# Patient Record
Sex: FEMALE | Race: BLACK OR AFRICAN AMERICAN
[De-identification: names, ages, dates, MRNs, and addresses within clinical notes are randomized per-mention and may not be internally consistent; named-entity substitution may affect disease eponyms.]

---

## 2018-10-21 ENCOUNTER — HOSPITAL ENCOUNTER (EMERGENCY)
Dept: HOSPITAL 97 - ER | Age: 28
Discharge: HOME | End: 2018-10-21
Payer: COMMERCIAL

## 2018-10-21 DIAGNOSIS — Z3A.35: ICD-10-CM

## 2018-10-21 DIAGNOSIS — L73.9: Primary | ICD-10-CM

## 2018-10-21 PROCEDURE — 0J9F0ZZ DRAINAGE OF LEFT UPPER ARM SUBCUTANEOUS TISSUE AND FASCIA, OPEN APPROACH: ICD-10-PCS

## 2018-10-21 PROCEDURE — 99283 EMERGENCY DEPT VISIT LOW MDM: CPT

## 2018-10-21 NOTE — EDPHYS
Physician Documentation                                                                           

 [facility]                                                                

Name: Joi Duncan                                                                                

Age: 28 yrs                                                                                       

Sex: Female                                                                                       

: 1990                                                                                   

MRN: [id number]                                                                                   

Arrival Date: 10/21/2018                                                                          

Time: 14:43                                                                                       

Account#: [id number]                                                                            

Bed 20                                                                                            

Private MD:                                                                                       

ED Physician Kalia Alcazar                                                                       

HPI:                                                                                              

10/21                                                                                             

15:15 This 28 yrs old Black Female presents to ER via Ambulatory with complaints of Abscess.  snw 

15:15 the patient presents with a swollen area of the left axilla. Description: The affected  snw 

      area is very small, localized, swollen. Onset: The symptoms/episode began/occurred          

      suddenly, 2 day(s) ago, and became persistent. Associated signs and symptoms: Pertinent     

      positives: pain. Severity of symptoms: At their worst the symptoms were mild, moderate.     

      The patient has not experienced similar symptoms in the past. 35 wk IUP, no vomiting,       

      no fever.                                                                                   

                                                                                                  

OB/GYN:                                                                                           

15:01 LMP 2018                                                                           aj1 

                                                                                                  

Historical:                                                                                       

- Allergies:                                                                                      

15:01 No Known Allergies;                                                                     aj1 

- Home Meds:                                                                                      

15:01 Prenatal Vitamin Oral tab 1 tab once daily [Active];                                    aj1 

- PMHx:                                                                                           

15:01 gestational diabetes;                                                                   aj1 

                                                                                                  

- Immunization history:: Flu vaccine is not up to date.                                           

- Social history:: Smoking status: Patient/guardian denies using tobacco.                         

- Ebola Screening: : Patient denies travel to an Ebola-affected area in the 21 days               

  before illness onset.                                                                           

                                                                                                  

                                                                                                  

ROS:                                                                                              

15:15 Constitutional: Negative for fever, chills, and weight loss, Eyes: Negative for injury, snw 

      pain, redness, and discharge, ENT: Negative for injury, pain, and discharge, Neck:          

      Negative for injury, pain, and swelling, Cardiovascular: Negative for chest pain,           

      palpitations, and edema, Respiratory: Negative for shortness of breath, cough,              

      wheezing, and pleuritic chest pain, Abdomen/GI: Negative for abdominal pain, nausea,        

      vomiting, diarrhea, and constipation, Back: Negative for injury and pain, : Negative      

      for injury, bleeding, discharge, and swelling, MS/Extremity: Negative for injury and        

      deformity, Neuro: Negative for headache, weakness, numbness, tingling, and seizure,         

      Psych: Negative for depression, anxiety, suicide ideation, homicidal ideation, and          

      hallucinations.                                                                             

15:15 Skin: Positive for pustules, swelling, of the left axilla.                                  

                                                                                                  

Exam:                                                                                             

15:13 Constitutional:  This is a well developed, well nourished patient who is awake, alert,  snw 

      and in no acute distress. Head/Face:  Normocephalic, atraumatic. Eyes:  Pupils equal        

      round and reactive to light, extra-ocular motions intact.  Lids and lashes normal.          

      Conjunctiva and sclera are non-icteric and not injected.  Cornea within normal limits.      

      Periorbital areas with no swelling, redness, or edema. ENT:  Nares patent. No nasal         

      discharge, no septal abnormalities noted.  Tympanic membranes are normal and external       

      auditory canals are clear.  Oropharynx with no redness, swelling, or masses, exudates,      

      or evidence of obstruction, uvula midline.  Mucous membranes moist. Neck:  Trachea          

      midline, no thyromegaly or masses palpated, and no cervical lymphadenopathy.  Supple,       

      full range of motion without nuchal rigidity, or vertebral point tenderness.  No            

      Meningismus. Chest/axilla:  Normal chest wall appearance and motion.  Nontender with no     

      deformity.  No lesions are appreciated. Cardiovascular:  Regular rate and rhythm with a     

      normal S1 and S2.  No gallops, murmurs, or rubs.  Normal PMI, no JVD.  No pulse             

      deficits. Respiratory:  Lungs have equal breath sounds bilaterally, clear to                

      auscultation and percussion.  No rales, rhonchi or wheezes noted.  No increased work of     

      breathing, no retractions or nasal flaring. Abdomen/GI:  Soft, non-tender, with normal      

      bowel sounds.  No distension or tympany.  No guarding or rebound.  No evidence of           

      tenderness throughout. Back:  No spinal tenderness.  No costovertebral tenderness.          

      Full range of motion. MS/ Extremity:  Pulses equal, no cyanosis.  Neurovascular intact.     

       Full, normal range of motion. Neuro:  Awake and alert, GCS 15, oriented to person,         

      place, time, and situation.  Cranial nerves II-XII grossly intact.  Motor strength 5/5      

      in all extremities.  Sensory grossly intact.  Cerebellar exam normal.  Normal gait.         

      Psych:  Awake, alert, with orientation to person, place and time.  Behavior, mood, and      

      affect are within normal limits.                                                            

15:13 Skin: Appearance: normal except for affected area, lesion(s), located on the  lower         

      left axilla, small, small, tender folliculitis.                                             

                                                                                                  

Vital Signs:                                                                                      

15:01  / 58; Pulse 109; Resp 20; Temp 97.0; Pulse Ox 100% on R/A; Weight 104.78 kg (R); aj1 

      Height 4 ft. 11 in. (149.86 cm) (R); Pain 10/10;                                            

16:00  / 72; Pulse 87; Resp 16; Pulse Ox 99% on R/A; Pain 4/10;                         em  

15:01 Body Mass Index 46.66 (104.78 kg, 149.86 cm)                                            aj 

                                                                                                  

Procedures:                                                                                       

15:30 I \T\ D: Incision and drainage was performed for an abscess of the left axilla. Prepped   snw

      with hibiclens. Anesthetized with 2 ml's 1% Lidocaine. Incised with #11 blade. Drained      

      large amount purulent fluid. Dressing: sterile 4x4 gauze, the patient tolerated the         

      procedure well.                                                                             

                                                                                                  

MDM:                                                                                              

15:07 Patient medically screened.                                                             snw 

15:14 Data reviewed: vital signs, nurses notes. Data interpreted: Pulse oximetry: on room air snw 

      is 100 %. Interpretation: normal. Counseling: I had a detailed discussion with the          

      patient and/or guardian regarding: the historical points, exam findings, and any            

      diagnostic results supporting the discharge/admit diagnosis, the need for outpatient        

      follow up, for definitive care, to return to the emergency department if symptoms           

      worsen or persist or if there are any questions or concerns that arise at home. Special     

      discussion: I discussed in detail with the patient the higher chance of wound infection     

      based on his presenting history. Based on the history and exam findings, there is no        

      indication for further emergent testing or inpatient evaluation. I discussed with the       

      patient/guardian the need to see the OB Gyne specialist for further evaluation of the       

      symptoms. I discussed with the patient/guardian the need to see the primary care            

      provider for further evaluation of the symptoms.                                            

                                                                                                  

Administered Medications:                                                                         

15:23 Drug: Lidocaine (1 %) 5 mg Route: Infiltration;                                         em  

15:33 Follow up: Response: No adverse reaction; Pain is decreased                             em  

15:23 Drug: Clindamycin 300 mg Route: PO;                                                     em  

15:33 Follow up: Response: No adverse reaction                                                em  

                                                                                                  

                                                                                                  

Disposition:                                                                                      

16:48 Co-signature as Attending Physician, Kalia Alcazar MD I agree with the assessment and   kdr 

      plan of care.                                                                               

                                                                                                  

Disposition:                                                                                      

10/21/18 15:12 Discharged to Home. Impression: Folliculitis, Pregnant state.                      

- Condition is Stable.                                                                            

- Discharge Instructions: Folliculitis, Third Trimester of Pregnancy, Easy-to-Read,               

  Incision and Drainage, Care After, Heat Therapy.                                                

- Prescriptions for Clindamycin HCl 300 mg Oral Capsule - take 1 capsule by ORAL route            

  every 6 hours for 10 days; 40 capsule.                                                          

- Medication Reconciliation Form, Thank You Letter, Antibiotic Education, Prescription            

  Opioid Use form.                                                                                

- Follow up: Private Physician; When: 2 - 3 days; Reason: Recheck today's complaints,             

  Continuance of care, Re-evaluation by your physician. Follow up: Emergency                      

  Department; When: As needed; Reason: Worsening of condition.                                    

                                                                                                  

                                                                                                  

                                                                                                  

Signatures:                                                                                       

Belinda Ashford RN                     RN   aj1                                                  

Kalia Alcazar MD MD kdr Therrien, Shelly, FNP-C                 FNP-Csnw                                                  

Oswald Lane, FABIOLAN                       LVN  em                                                   

                                                                                                  

Corrections: (The following items were deleted from the chart)                                    

16:14 15:12 10/21/2018 15:12 Discharged to Home. Impression: Folliculitis; Pregnant state.    em  

      Condition is Stable. Forms are Medication Reconciliation Form, Thank You Letter,            

      Antibiotic Education, Prescription Opioid Use. Follow up: Private Physician; When: 2 -      

      3 days; Reason: Recheck today's complaints, Continuance of care, Re-evaluation by your      

      physician. Follow up: Emergency Department; When: As needed; Reason: Worsening of           

      condition. snw                                                                              

                                                                                                  

**************************************************************************************************

## 2020-07-28 ENCOUNTER — HOSPITAL ENCOUNTER (EMERGENCY)
Dept: HOSPITAL 97 - ER | Age: 30
Discharge: HOME | End: 2020-07-28
Payer: SELF-PAY

## 2020-07-28 VITALS — TEMPERATURE: 97.6 F

## 2020-07-28 VITALS — SYSTOLIC BLOOD PRESSURE: 99 MMHG | OXYGEN SATURATION: 99 % | DIASTOLIC BLOOD PRESSURE: 66 MMHG

## 2020-07-28 DIAGNOSIS — O24.419: ICD-10-CM

## 2020-07-28 DIAGNOSIS — O20.0: Primary | ICD-10-CM

## 2020-07-28 DIAGNOSIS — Z3A.01: ICD-10-CM

## 2020-07-28 LAB
BUN BLD-MCNC: 5 MG/DL (ref 7–18)
GLUCOSE SERPLBLD-MCNC: 93 MG/DL (ref 74–106)
HCG SERPL-ACNC: (no result) MIU/ML (ref 1–3)
HCT VFR BLD CALC: 42.2 % (ref 36–45)
LYMPHOCYTES # SPEC AUTO: 2.1 K/UL (ref 0.7–4.9)
PMV BLD: 8.3 FL (ref 7.6–11.3)
POTASSIUM SERPL-SCNC: 4 MMOL/L (ref 3.5–5.1)
RBC # BLD: 4.46 M/UL (ref 3.86–4.86)

## 2020-07-28 PROCEDURE — 81025 URINE PREGNANCY TEST: CPT

## 2020-07-28 PROCEDURE — 80048 BASIC METABOLIC PNL TOTAL CA: CPT

## 2020-07-28 PROCEDURE — 85025 COMPLETE CBC W/AUTO DIFF WBC: CPT

## 2020-07-28 PROCEDURE — 99284 EMERGENCY DEPT VISIT MOD MDM: CPT

## 2020-07-28 PROCEDURE — 86900 BLOOD TYPING SEROLOGIC ABO: CPT

## 2020-07-28 PROCEDURE — 76817 TRANSVAGINAL US OBSTETRIC: CPT

## 2020-07-28 PROCEDURE — 96360 HYDRATION IV INFUSION INIT: CPT

## 2020-07-28 PROCEDURE — 81003 URINALYSIS AUTO W/O SCOPE: CPT

## 2020-07-28 PROCEDURE — 86901 BLOOD TYPING SEROLOGIC RH(D): CPT

## 2020-07-28 PROCEDURE — 84702 CHORIONIC GONADOTROPIN TEST: CPT

## 2020-07-28 PROCEDURE — 96361 HYDRATE IV INFUSION ADD-ON: CPT

## 2020-07-28 PROCEDURE — 36415 COLL VENOUS BLD VENIPUNCTURE: CPT

## 2020-07-28 NOTE — EDPHYS
Physician Documentation                                                                           

 [facility]                                                                 

Name: Joi Duncan                                                                                

Age: 30 yrs                                                                                       

Sex: Female                                                                                       

: 1990                                                                                   

MRN: [id number]                                                                                   

Arrival Date: 2020                                                                          

Time: 08:34                                                                                       

Account#: [id number]                                                                            

Bed 7                                                                                             

Private MD:                                                                                       

ALISSA Physician Andrea Levin                                                                      

HPI:                                                                                              

                                                                                             

10:03 This 30 yrs old Black Female presents to ER via Ambulatory with complaints of RT SIDE   angel 

      ABD PAIN (7WKS PREG).                                                                       

10:03 The patient presents to the emergency department with vaginal bleeding, that is light.  angel 

      The estimated gestational age is 7 weeks. Pregnancy course: Prenatal care: none.            

      Previous pregnancies: in previous pregnancies patient has had vaginal delivery.             

      Associated signs and symptoms: The patient has no apparent associated signs or              

      symptoms. The patient has not experienced similar symptoms in the past.                     

                                                                                                  

OB/GYN:                                                                                           

09:49 LMP 2020                                                                           tw2 

10:03  6, Full Term 4, Premature 0,  1, Living 4                               angel 

                                                                                                  

Historical:                                                                                       

- Allergies:                                                                                      

09:19 No Known Allergies;                                                                     ss  

- Home Meds:                                                                                      

09:50 Prenatal Vitamin Oral tab 1 tab once daily [Active];                                    tw2 

- PMHx:                                                                                           

09:19 gestational diabetes;                                                                   ss  

- PSHx:                                                                                           

09:19 None;                                                                                   ss  

                                                                                                  

- Immunization history:: Adult Immunizations up to date.                                          

- Social history:: Smoking status: Patient denies any tobacco usage or history of.                

                                                                                                  

                                                                                                  

ROS:                                                                                              

10:04 Constitutional: Negative for fever, chills, and weight loss, Eyes: Negative for injury, angel 

      pain, redness, and discharge, ENT: Negative for injury, pain, and discharge, Neck:          

      Negative for injury, pain, and swelling, Cardiovascular: Negative for chest pain,           

      palpitations, and edema, Respiratory: Negative for shortness of breath, cough,              

      wheezing, and pleuritic chest pain, Back: Negative for injury and pain, : Negative        

      for injury, bleeding, discharge, and swelling, MS/Extremity: Negative for injury and        

      deformity, Skin: Negative for injury, rash, and discoloration, Neuro: Negative for          

      headache, weakness, numbness, tingling, and seizure, Psych: Negative for depression,        

      anxiety, suicide ideation, homicidal ideation, and hallucinations, Allergy/Immunology:      

      Negative for hives, rash, and allergies, Endocrine: Negative for neck swelling,             

      polydipsia, polyuria, polyphagia, and marked weight changes, Hematologic/Lymphatic:         

      Negative for swollen nodes, abnormal bleeding, and unusual bruising.                        

10:04 Abdomen/GI: Positive for abdominal pain, of the right lower quadrant.                       

                                                                                                  

Exam:                                                                                             

10:04 Constitutional:  This is a well developed, well nourished patient who is awake, alert,  angel 

      and in no acute distress. Head/Face:  Normocephalic, atraumatic. Eyes:  Pupils equal        

      round and reactive to light, extra-ocular motions intact.  Lids and lashes normal.          

      Conjunctiva and sclera are non-icteric and not injected.  Cornea within normal limits.      

      Periorbital areas with no swelling, redness, or edema. ENT:  Nares patent. No nasal         

      discharge, no septal abnormalities noted.  Tympanic membranes are normal and external       

      auditory canals are clear.  Oropharynx with no redness, swelling, or masses, exudates,      

      or evidence of obstruction, uvula midline.  Mucous membranes moist. Neck:  Trachea          

      midline, no thyromegaly or masses palpated, and no cervical lymphadenopathy.  Supple,       

      full range of motion without nuchal rigidity, or vertebral point tenderness.  No            

      Meningismus. Chest/axilla:  Normal chest wall appearance and motion.  Nontender with no     

      deformity.  No lesions are appreciated. Cardiovascular:  Regular rate and rhythm with a     

      normal S1 and S2.  No gallops, murmurs, or rubs.  Normal PMI, no JVD.  No pulse             

      deficits. Respiratory:  Lungs have equal breath sounds bilaterally, clear to                

      auscultation and percussion.  No rales, rhonchi or wheezes noted.  No increased work of     

      breathing, no retractions or nasal flaring. Back:  No spinal tenderness.  No                

      costovertebral tenderness.  Full range of motion. Female :  Normal external               

      genitalia. Skin:  Warm, dry with normal turgor.  Normal color with no rashes, no            

      lesions, and no evidence of cellulitis. MS/ Extremity:  Pulses equal, no cyanosis.          

      Neurovascular intact.  Full, normal range of motion. Neuro:  Awake and alert, GCS 15,       

      oriented to person, place, time, and situation.  Cranial nerves II-XII grossly intact.      

      Motor strength 5/5 in all extremities.  Sensory grossly intact.  Cerebellar exam            

      normal.  Normal gait. Psych:  Awake, alert, with orientation to person, place and time.     

       Behavior, mood, and affect are within normal limits.                                       

10:04 Abdomen/GI: Inspection: distension, Bowel sounds: normal, Palpation: soft, nontender,       

      Liver: no appreciated palpable abnormalities, Hernia: not appreciated.                      

                                                                                                  

Vital Signs:                                                                                      

09:04  / 57; Pulse 95; Resp 17; Temp 97.6(TE); Pulse Ox 100% on R/A; Weight 97.98 kg    tw2 

      (R); Height 5 ft. 0 in. (152.40 cm); Pain 8/10;                                             

09:48 BP 93 / 54 Supine; Pulse 74; Resp 17; Pulse Ox 100% on R/A;                             tw2 

10:36 BP 99 / 66; Pulse 72; Resp 16; Pulse Ox 99% on R/A;                                     tw2 

09:04 Body Mass Index 42.18 (97.98 kg, 152.40 cm)                                             tw2 

                                                                                                  

MDM:                                                                                              

08:50 Patient medically screened.                                                             [facility] 

10:14 Data reviewed: vital signs, nurses notes, lab test result(s), radiologic studies,       angel 

      ultrasound.                                                                                 

10:15 Differential diagnosis: Data interpreted: Cardiac monitor: not applicable for this      [facility] 

      patient encounter. rate is 74 beats/min, Pulse oximetry: on room air is 100 %.              

      Counseling: I had a detailed discussion with the patient and/or guardian regarding: the     

      historical points, exam findings, and any diagnostic results supporting the                 

      discharge/admit diagnosis, lab results, radiology results, the need for outpatient          

      follow up, for definitive care, an OB/Gyne specialist. ED course: non toxic, m obesity,     

      pelvic rest, follow , return if symptoms worse.                                             

                                                                                                  

                                                                                             

08:51 Order name: Quantitative Hcg                                                            [facility] 

                                                                                             

08:51 Order name: Abo/rh Typing; Complete Time: 10:54                                         [facility] 

                                                                                             

08:51 Order name: Basic Metabolic Panel; Complete Time: 10:54                                 [facility] 

                                                                                             

08:51 Order name: CBC with Diff; Complete Time: 10:54                                         [facility] 

                                                                                             

08:52 Order name: HCG, Quantitative; Complete Time: 10:54                                     [facility]

                                                                                             

09:29 Order name: Urine Dipstick--Ancillary (enter results); Complete Time: 10:54             bd  

                                                                                             

08:51 Order name: Urine Pregnancy Test (obtain specimen); Complete Time: 09:24                [facility] 

                                                                                             

08:51 Order name: IV Saline Lock; Complete Time: 09:24                                        [facility] 

                                                                                             

08:51 Order name: Labs collected and sent; Complete Time: 09:24                               [facility] 

                                                                                             

08:51 Order name: NPO; Complete Time: 10:38                                                   [facility] 

                                                                                             

09:00 Order name: Transvaginal OB                                                             [facility]

                                                                                             

09:29 Order name: Urine Pregnancy--Ancillary (enter results); Complete Time: 10:54            bd  

                                                                                             

08:51 Order name: Urine Dipstick-Ancillary (obtain specimen); Complete Time: 09:24            [facility] 

                                                                                                  

Administered Medications:                                                                         

09:16 Drug: NS 0.9% 1000 ml Route: IV; Rate: 1 bolus; Site: right antecubital;                tw2 

11:00 Follow up: Response: No adverse reaction; IV Status: Completed infusion; IV Intake:     tw2 

      1000ml                                                                                      

                                                                                                  

                                                                                                  

Disposition:                                                                                      

20 10:55 Discharged to Home. Impression: Pregnancy related conditions, unspecified,         

  first trimester, Threatened .                                                           

- Condition is Stable.                                                                            

- Discharge Instructions: Threatened Miscarriage, First Trimester of Pregnancy,                   

  Easy-to-Read, First Trimester of Pregnancy, Threatened Miscarriage, Easy-to-Read,               

  Pelvic Rest, Vaginal Bleeding During Pregnancy, First Trimester, Easy-to-Read.                  

- Prescriptions for Prenatal Vitamin 27- 0.8 mg Oral Tablet - take 1 tablet by ORAL               

  route once daily; 30 tablet.                                                                    

- Medication Reconciliation Form, Thank You Letter, Antibiotic Education, Prescription            

  Opioid Use, Work release form form.                                                             

- Follow up: Private Physician; When: 2 - 3 days; Reason: Recheck today's complaints,             

  Continuance of care, Re-evaluation by your physician. Follow up: Keron Quiroz;                

  When: 2 - 3 days; Reason: Recheck today's complaints, Re-evaluation by your physician.          

- Problem is new.                                                                                 

- Symptoms have improved.                                                                         

                                                                                                  

                                                                                                  

                                                                                                  

Signatures:                                                                                       

Dispatcher [employer]                           [facility]                                                 

Andrea Levin MD MD cha Smirch, Shelby, RN                      RN   ss                                                   

Ana Pineda RN                          RN   tw2                                                  

                                                                                                  

Corrections: (The following items were deleted from the chart)                                    

09:00 08:52 Transvaginal Study (Probe)+[id number] ordered. [facility]

09:50 09:19 Home Meds: None;                                                                tw2 

11:11 10:55 2020 10:55 Discharged to Home. Impression: Pregnancy related conditions,    tw2 

      unspecified, first trimester; Threatened . Condition is Stable. Discharge           

      Instructions: Threatened Miscarriage, Threatened Miscarriage, Easy-to-Read, First           

      Trimester of Pregnancy, Easy-to-Read, First Trimester of Pregnancy, Pelvic Rest,            

      Vaginal Bleeding During Pregnancy, First Trimester, Easy-to-Read. Prescriptions for         

      Prenatal Vitamin 27-0.8 mg Oral Tablet - take 1 tablet by ORAL route once daily; 30         

      tablet. and Forms are Work release form, Medication Reconciliation Form, Thank You          

      Letter, Antibiotic Education, Prescription Opioid Use. Follow up: Private Physician;        

      When: 2 - 3 days; Reason: Recheck today's complaints, Continuance of care,                  

      Re-evaluation by your physician. Follow up: Keron Quiroz; When: 2 - 3 days; Reason:       

      Recheck today's complaints, Re-evaluation by your physician. Problem is new. Symptoms       

      have improved. angel                                                                          

                                                                                                  

**************************************************************************************************

## 2020-07-28 NOTE — ER
Nurse's Notes                                                                                     

 [facility] [place]                                                                 

Name: Joi Duncan                                                                                

Age: 30 yrs                                                                                       

Sex: Female                                                                                       

: 1990                                                                                   

MRN: [id number]                                                                                   

Arrival Date: 2020                                                                          

Time: 08:34                                                                                       

Account#: [id number]                                                                            

Bed 7                                                                                             

Private MD:                                                                                       

Diagnosis: Pregnancy related conditions, unspecified, first trimester;Threatened          

                                                                                                  

Presentation:                                                                                     

                                                                                             

09:13 Chief complaint: Patient states: Seen last week to have IUD removed but clinician was   ss  

      unable to locate the IUD and ordered an outpatient ultrasound. Pt did not make her US       

      appointment and reports that she recently had a positive home UPT. RLQ pain and brown       

      spotting that began yesterday. Coronavirus screen: Patient denies a cough. Patient          

      denies shortness of breath or difficulty breathing. Patient denies measured and/or          

      subjective temperature greater than 100.4F prior to today's visit. Patient denies           

      travel on a cruise ship or to a country the [facility] currently lists as an affected area.        

      Patient denies contact with known and/or suspected case of COVID-19. Proceed with           

      normal triage. Ebola Screen: Patient denies exposure to infectious person. Patient          

      denies travel to an Ebola-affected area in the 21 days before illness onset. Initial        

      Sepsis Screen: Does the patient meet any 2 criteria? HR > 90 bpm. Does the patient have     

      a suspected source of infection? No. Patient's initial sepsis screen is negative. Risk      

      Assessment: Do you want to hurt yourself or someone else? Patient reports no desire to      

      harm self or others. Onset of symptoms was 2020.                                   

09:13 Method Of Arrival: Ambulatory                                                           ss  

09:13 Acuity: DIEGO 3                                                                           ss  

                                                                                                  

OB/GYN:                                                                                           

09:49 LMP 2020                                                                           tw2 

10:03  6, Full Term 4, Premature 0,  1, Living 4                               angel 

                                                                                                  

Historical:                                                                                       

- Allergies:                                                                                      

09:19 No Known Allergies;                                                                     ss  

- Home Meds:                                                                                      

09:50 Prenatal Vitamin Oral tab 1 tab once daily [Active];                                    tw2 

- PMHx:                                                                                           

09:19 gestational diabetes;                                                                   ss  

- PSHx:                                                                                           

09:19 None;                                                                                   ss  

                                                                                                  

- Immunization history:: Adult Immunizations up to date.                                          

- Social history:: Smoking status: Patient denies any tobacco usage or history of.                

                                                                                                  

                                                                                                  

Screenin:48 Abuse screen: Denies threats or abuse. Nutritional screening: No deficits noted.        tw2 

      Tuberculosis screening: No symptoms or risk factors identified. Fall Risk None              

      identified.                                                                                 

                                                                                                  

Assessment:                                                                                       

08:45 General: Appears in no apparent distress. obese, well groomed, Behavior is calm,        tw2 

      cooperative, appropriate for age. Pain: Complains of pain in right lower quadrant.          

      Neuro: Level of Consciousness is awake, alert, obeys commands, Oriented to person,          

      place, time, situation. Cardiovascular: Heart tones S1 S2 Capillary refill < 3 seconds      

      Patient's skin is warm and dry. Respiratory: Airway is patent Respiratory effort is         

      even, unlabored, Respiratory pattern is regular, symmetrical, Breath sounds are clear       

      bilaterally. GI: No signs and/or symptoms were reported involving the gastrointestinal      

      system. Abdomen is round non-distended, obese, Bowel sounds present X 4 quads. :          

      Reports cramping, in right lower quadrant(s) "also im spotting like pink today, but         

      yesterday it was brown". EENT: No signs and/or symptoms were reported regarding the         

      EENT system. Derm: No signs and/or symptoms reported regarding the dermatologic system.     

      Musculoskeletal: Circulation, motion, and sensation intact. Capillary refill < 3            

      seconds.                                                                                    

09:48 Reassessment: Patient appears in no apparent distress at this time. No changes from     tw2 

      previously documented assessment. Patient and/or family updated on plan of care and         

      expected duration. Pain level reassessed. Patient is alert, oriented x 3, equal             

      unlabored respirations, skin warm/dry/pink.                                                 

10:36 Reassessment: Patient appears in no apparent distress at this time. No changes from     tw2 

      previously documented assessment. Patient and/or family updated on plan of care and         

      expected duration. Pain level reassessed. Patient is alert, oriented x 3, equal             

      unlabored respirations, skin warm/dry/pink.                                                 

11:11 Reassessment: Patient appears in no apparent distress at this time. No changes from     tw2 

      previously documented assessment. Patient and/or family updated on plan of care and         

      expected duration. Pain level reassessed. Patient is alert, oriented x 3, equal             

      unlabored respirations, skin warm/dry/pink.                                                 

                                                                                                  

Vital Signs:                                                                                      

09:04  / 57; Pulse 95; Resp 17; Temp 97.6(TE); Pulse Ox 100% on R/A; Weight 97.98 kg    tw2 

      (R); Height 5 ft. 0 in. (152.40 cm); Pain 8/10;                                             

09:48 BP 93 / 54 Supine; Pulse 74; Resp 17; Pulse Ox 100% on R/A;                             tw2 

10:36 BP 99 / 66; Pulse 72; Resp 16; Pulse Ox 99% on R/A;                                     tw2 

09:04 Body Mass Index 42.18 (97.98 kg, 152.40 cm)                                             tw2 

                                                                                                  

ED Course:                                                                                        

08:34 Patient arrived in ED.                                                                  fj1 

08:45 Ana Pineda, RN is Primary Nurse.                                                        tw2 

08:45 Bed in low position. Call light in reach. Pulse ox on. NIBP on. Warm blanket given.     tw2 

08:50 Andrea Levin MD is Attending Physician.                                             [facility] 

08:54 Radiology exam delayed due to lab results not completed at this time. (HCG) pregnancy   aa4 

      test not completed at this time.                                                            

09:15 Inserted saline lock: 20 gauge in right antecubital area, using aseptic technique.      tw2 

      Blood collected.                                                                            

09:17 Triage completed.                                                                         

09:19 Arm band placed on right wrist.                                                           

10:23 Transvaginal OB In Process Unspecified.                                                 EDMS

10:55 Keron Quiroz MD is Referral Physician.                                              [facility] 

11:11 No provider procedures requiring assistance completed. IV discontinued, intact,         tw2 

      bleeding controlled, No redness/swelling at site. Pressure dressing applied.                

                                                                                                  

Administered Medications:                                                                         

09:16 Drug: NS 0.9% 1000 ml Route: IV; Rate: 1 bolus; Site: right antecubital;                tw2 

11:00 Follow up: Response: No adverse reaction; IV Status: Completed infusion; IV Intake:     tw2 

      1000ml                                                                                      

                                                                                                  

                                                                                                  

Intake:                                                                                           

11:00 IV: 1000ml; Total: 1000ml.                                                              tw2 

                                                                                                  

Outcome:                                                                                          

10:55 Discharge ordered by MD.                                                                [facility] 

11:11 Discharged to home ambulatory.                                                          tw2 

11:11 Condition: stable                                                                           

11:11 Discharge instructions given to patient, Instructed on discharge instructions, follow       

      up and referral plans. medication usage, Demonstrated understanding of instructions,        

      follow-up care, medications.                                                                

11:11 Patient left the ED.                                                                    tw2 

                                                                                                  

Signatures:                                                                                       

Dispatcher [employer]                           EDMS                                                 

Andrea Levin MD MD cha Frazier, Amanda                              aa4                                                  

Kate Urias RN RN                                                      

Ana Pineda RN                          RN   tw2                                                  

Lauri Sneed                                 fj1                                                  

                                                                                                  

Corrections: (The following items were deleted from the chart)                                    

09:17 08:51 Chief complaint: ss                                                               ss  

09:50 09:19 Home Meds: None;                                                                tw2 

                                                                                                  

**************************************************************************************************

## 2020-07-28 NOTE — RAD REPORT
EXAM DESCRIPTION:  US - Transvaginal OB - 7/28/2020 10:23 am

 

CLINICAL HISTORY:  ABD CRAMPING, PREGNANT

 

COMPARISON:  No comparisons

 

FINDINGS:  A single gestational sac is seen within the uterus. The shape of the sac is within normal 
limits for gestational age. Within the sac is a single fetal pole with crown-rump length of 11 mm, co
rrelating to estimated gestational age of 7 weeks 1 day. Estimated date of delivery is 03/15/2021.

 

Heart rate is 149 BPM.

 

The placenta is not yet developed due to early gestational age.

 

The maternal adnexa and ovaries are within normal limits. Normal Doppler blood flow was demonstrated 
to both ovaries.

 

 

IMPRESSION:  Single live early intrauterine gestation with estimated gestational age of 7 weeks 1 day
, RINA 03/15/2021.

## 2024-11-06 ENCOUNTER — HOSPITAL ENCOUNTER (EMERGENCY)
Dept: HOSPITAL 97 - ER | Age: 34
Discharge: HOME | End: 2024-11-06
Payer: SELF-PAY

## 2024-11-06 VITALS — SYSTOLIC BLOOD PRESSURE: 136 MMHG | OXYGEN SATURATION: 100 % | TEMPERATURE: 98.7 F | DIASTOLIC BLOOD PRESSURE: 69 MMHG

## 2024-11-06 DIAGNOSIS — L23.89: Primary | ICD-10-CM

## 2024-11-08 ENCOUNTER — HOSPITAL ENCOUNTER (EMERGENCY)
Dept: HOSPITAL 97 - ER | Age: 34
Discharge: HOME | End: 2024-11-08
Payer: SELF-PAY

## 2024-11-08 VITALS — TEMPERATURE: 98.1 F | OXYGEN SATURATION: 96 % | DIASTOLIC BLOOD PRESSURE: 90 MMHG | SYSTOLIC BLOOD PRESSURE: 128 MMHG

## 2024-11-08 DIAGNOSIS — L50.9: Primary | ICD-10-CM

## 2024-11-08 PROCEDURE — 96375 TX/PRO/DX INJ NEW DRUG ADDON: CPT

## 2024-11-08 PROCEDURE — 99284 EMERGENCY DEPT VISIT MOD MDM: CPT

## 2024-11-08 PROCEDURE — 96374 THER/PROPH/DIAG INJ IV PUSH: CPT

## 2025-01-31 ENCOUNTER — HOSPITAL ENCOUNTER (EMERGENCY)
Dept: HOSPITAL 97 - ER | Age: 35
Discharge: HOME | End: 2025-01-31
Payer: SELF-PAY

## 2025-01-31 VITALS — DIASTOLIC BLOOD PRESSURE: 96 MMHG | SYSTOLIC BLOOD PRESSURE: 133 MMHG | TEMPERATURE: 98.2 F | OXYGEN SATURATION: 100 %

## 2025-01-31 DIAGNOSIS — R19.7: ICD-10-CM

## 2025-01-31 DIAGNOSIS — B34.8: Primary | ICD-10-CM

## 2025-01-31 DIAGNOSIS — R11.2: ICD-10-CM

## 2025-01-31 LAB — SARS-COV+SARS-COV-2 AG RESP QL IA.RAPID: (no result)

## 2025-01-31 PROCEDURE — 87804 INFLUENZA ASSAY W/OPTIC: CPT

## 2025-01-31 PROCEDURE — 36415 COLL VENOUS BLD VENIPUNCTURE: CPT

## 2025-01-31 PROCEDURE — 87811 SARS-COV-2 COVID19 W/OPTIC: CPT

## 2025-01-31 PROCEDURE — 99283 EMERGENCY DEPT VISIT LOW MDM: CPT

## 2025-01-31 NOTE — XMS REPORT
Continuity of Care Document



                          Created on: 2025





LACYEDVIN

External Reference #: [id number]

: 1990

Sex: Female



Demographics





                                        Address             [address] 

[place]  [address]

 

                                        Home Phone          [phone]

 

                                        Work Phone          [phone]

 

                                        Mobile Phone        [phone]

 

                                        Email Address       [email or web address]

 

                                        Preferred Language  English

 

                                        Marital Status      Unknown

 

                                        Anabaptist Affiliation Unknown

 

                                        Race                Unknown

 

                                        Additional Race(s)  Unavailable

 

                                        Ethnic Group         or 





Author





                                        Name                Unknown

 

                                        Address             [address]

[place]  [address]

 

                                        [facility]

thconnect

 

                                        Address             [address]

[address]

[place]  [address]

 

                                        Phone               [phone]





Support





                          Name         Relationship Address      Phone

 

                                EDVIN HOUSE   Personal Relationship [id number]

[address]

[place]  [address]                      [phone]

 

                                EDVIN HOUSE   Personal Relationship [id number]

[address]

[place]  [address]                      [phone]

 

                                EKTA HOUSE               [address]



[place]  [address]                     [phone]

 

                                NONE            Emergency Contact [address]

[place]  [address]                   [phone]

 

                          DIONICIO MILLIEDIAMOND Significant  Unknown      [phone]

[phone]





Care Team Providers





                                Care Team Member Name Role            Phone

 

                                Daly Lindo Primary Care Physician [phone]

 

                                Josué Flores Attending Clinician Unavailable

 

                                MARY CAMACHO Attending Clinician Unavailab

Mary Knowles DO Attending Clinician [phone]

 

                                ERIKA MINOR Attending Clinician Unavailable

 

                                Erika Gramajo Attending Clinician [phone] 

 

                                Unknown, Attending Attending Clinician Unavailab

ROSIE Mckeon Attending Clinician UnavailROSIE Bess Attending Clinician Unavailtano

e

 

                                Doctor Unassigned, No Name Attending Clinician U

VENKAT Jackson Attending Clinician Tawana

vailable

 

                                RADIOLOGY       Attending Clinician Unavailable

 

                                Radiology       Attending Clinician Unavailable

 

                                LAUREN GUZMAN Attending Clinician Unavaila

Lauren Palmer Attending Clinician [phone]

[phone]

 

                                Velvet Jolly MA Attending Clinician UnavailTERRI Ash Attending Clinician Unavail

able

 

                                TERRI CONTRERAS Attending Clinician Unavail

able

 

                                NAZARIO STATON  Attending Clinician Unavailable

 

                                VIN ARIZMENDI Attending Clinician Unavailable

 

                                LEXII MIGUEL Attending Clinician Unav

ailable

 

                                Lavinia FRANCO, Lexii Davis Attending Clinician [phone]

[phone]

 

                                Campbell FRANCO, Sheila Attending Clinician [phone]

[phone]

 

                                SHEILA HIGHTOWER    Attending Clinician Unavailable

 

                                EBRAHIM, JUANY  Attending Clinician Unavailable

 

                                Ebrahim FNP, Rania Attending Clinician [phone]

[phone]

 

                                Alicia FNP, Wesley Attending Clinician [phone]

 

                                Provider, [facility] Attending Clinician

 Unavailable

 

                                Green FNP, Becky Attending Clinician [phone]

 

                                Nurse, [facility] Attending Clinician Un

available

 

                                Ciera LASSITER, Stefani UGARTE Attending Clinician Unavailab

DOMINIK Armstrong Attending Clinician Unavailab

Gt Garner MD Attending Clinician [phone]

 

                                GT SNOW  Attending Clinician Unavailable

 

                                WESLEY REED Attending Clinician UnavailJosué Denton Admitting Clinician Unavailable

 

                                MARY CAMACHO Admitting Clinician Unavailab

DAVID Reddy     Admitting Clinician Unavailable

 

                                LEXII MIGUEL Admitting Clinician Unav

ailable







Payers





                    Payer Name Policy Type Policy Number Effective Date Expirati

on Date Source

 

                                                    [facility]                          [id number]           2023 

00:00:00                                            







Problems





                                                    Condition 

Name                                    Condition 

Details                                 Condition 

Category                  Status                    Onset 

Date                                    Resolution 

Date                                    Last 

Treatment 

Date                                    Treating 

Clinician                 Comments                  Source

 

                      Fibroma    Fibroma    Disease    Active     2012 

00:00:

00                                                               [facility]







Allergies, Adverse Reactions, Alerts





                                                    Allergy 

Name                                    Allergy 

Type            Status          Severity        Reaction(s)     Onset 

Date                                    Inactive 

Date                                    Treating 

Clinician                 Comments                  Source

 

                                                    AMOXICIL

RACHEL                                     DRUG 

INGREDI         Active                          Unknown-Cmnt     

00:00:

00                                                              [facility]

 

                                                    Amoxicil

rachel                                     Propensi

ty to 

adverse 

reaction

s                   Active                                  Unknown - 

See comments                             

00:00:

00                                                          Gets 

Yeast 

Infection 

- 

requires 

Flagyl 

when 

prescribe

d this 

medicatio

n                                       [facility]

 

                                                    No Known 

Allergie

s            DA           Active       U                          

00:00:

00                                                              [facility]

are 

[facility]

[facility]

 

                                                    No Known 

Allergie

s            DA           Active       U                          

00:00:

00                                                              [facility]

are 

[facility]

 

                                                    NO KNOWN 

ALLERGIE

S                                       Drug 

Class   Active                                                  [facility]







Social History





                    Social Habit Start Date Stop Date Quantity  Comments  Source

 

                    Gender identity                                         [facility]

 

                    Sexual orientation                                         U

[facility]

 

                                        Alcohol intake      2023 

00:00:00                                2023 

00:00:00                                                    [facility]

 

                                                    History of Social 

function                                2023 

00:00:00                                2023 

00:00:00                                                    [facility]

 

                                                    Alcoholic beverage 

intake                                  2023 

00:00:00                                2023 

00:00:00                                                    [facility]

 

                                                    Exposure to 

SARS-CoV-2 (event)                      2023 

00:00:00                                2023 

11:44:00            Not sure                                [facility]

 

                                                    Tobacco use and 

exposure                                2012 

00:00:00                                2012 

00:00:00                                Smokeless 

tobacco non-user                                    [facility]

 

                                                    Cigarettes smoked 

current (pack per 

day) - Reported                         2012 

00:00:00                                2012 

00:00:00                                                    [facility]

 

                                                    Cigarette 

pack-years                              2012 

00:00:00                                2012 

00:00:00                                                    [facility]

 

                                                    History of tobacco 

use                                     2009 

00:00:00                                2011 

00:00:00            Cigarette Smoker                        [facility]

 

                                                    Sex assigned at 

birth                                   1990 

00:00:00                                1990 

00:00:00                                                    [facility]







                          Smoking Status Start Date   Stop Date    Source

 

                          Ex-smoker    2012 00:00:00 2012 00:00:00 [facility]







Medications





                                                    Ordered 

Medication 

Name                                    Filled 

Medication 

Name                                    Start 

Date                                    Stop 

Date                                    Current 

Medication?                             Ordering 

Clinician       Indication      Dosage          Frequency       Signature 

(SIG)               Comments            Components          Source

 

                                                    amoxicillin

-clavulanat

e 

(AUGMENTIN) 

875-125 mg 

per tablet 

1 tablet                                             

01:00:

00                                       

00:59

:00        No                               1{tbl}                1 tablet, 

Oral, 

Q12H, 2 

doses, 

First dose 

on Thu 

24 at 

2000, Last 

dose on 

24 at 

0800, 

ASAP, 

Reason for 

Anti-Infec

tive: 

Empiric 

Therapy 

for 

Suspected 

Infection, 

Empiric 

Therapy 

Site: Skin 

/ Soft 

tissue, 

Duration 

of 

therapy: 

Once (ED)                                                   [facility]

 

                                                    amoxicillin

-clavulanat

e 875-125 

mg per 

tablet                                               

00:00:

00                                       

04:59

:00        No                    [id number]  1{tbl}                Take 1 

tablet by 

mouth 

every 12 

(twelve) 

hours for 

7 days.                                                     [facility]

 

                                                    clotrimazol

e 1 % 

vaginal 

cream                                                

00:00:

00            Yes                  %                                  [facility]

 

                                                    fluconazole 

150 mg 

tablet                                               

00:00:

00            Yes                  mg                                 [facility]

 

                                                    sertraline 

50 mg 

tablet                                               

00:00:

00            Yes                  1mg                                [facility]

 

                                                    naltrexone 

50 mg 

tablet                                               

00:00:

00            Yes                  1mg                                [facility]

 

                                                    risperidone 

2 mg tablet                                         30 

00:00:

00            Yes                  15mg                               [facility]

 

                                                    naltrexone 

50 mg 

tablet                                              03 

00:00:

00            Yes                  1mg                                [facility]

 

                                                    risperidone 

1 mg tablet                                          

00:00:

00            Yes                  1mgrace [facility]

 

                                                    TAKE 3 

TABLETS BY 

MOUTH IN 

THE MORNING 

AND 3 

TABLETS IN 

THE 

EVENING. DO 

ALL THIS 

FOR 5 DAYS.                                         2023 

00:00:

00            Yes                                                     [facility]

 

                                                    nirmatrelvi

r-ritonavir 

(PAXLOVID) 

300 mg (150 

mg x 2)-100 

mg tablet                                           2023 

00:00:

00                                       

05:59

:00        No                    [id number]  3{tbl}                Take 3 

tablets by 

mouth in 

the 

morning 

and 3 

tablets in 

the 

evening. 

Do all 

this for 5 

days.                                                       [facility]

 

                                CELECOXIB                       2023 

00:00:

00            Yes                                                     [facility]

 

                                                    TAKE 2 TABS 

DAILY THE 

FIRST 5 

DAYS,THEN 1 

TAB DAILY 

THE LAST 5 

DAYS                                                2023

0- 

00:00:

00                                       

00:00

:00     No                      20                                      [facility]

 

                                                    TAKE 1 

CAPSULE 

TWICE 

DAILY.                                              2023

0- 

00:00:

00                                       

00:00

:00     No                      300                                     [facility]

 

                                                    TAKE 1 

CAPSULE 

DAILY WITH 

FOOD.                                               2023

0 

00:00:

00                                       

00:00

:00     No                      400                                     [facility]

 

                                AMOXICILLIN                      

00:00:

00            Yes                                                     [facility]

 

                                                    TAKE 1 

CAPSULE 

TWICE 

DAILY.                                               

00:00:

00                                       

00:00

:00     No                      500                                     [facility]

 

                                                    NITROFURANT

N                                                    

00:00:

00            Yes                                                     [facility]

 

                                                    TAKE 1 

CAPSULE 

TWICE 

DAILY.                                               

00:00:

00                                       

00:00

:00     No                      100                                     [facility]

 

                                                    iopamidol 

(ISOVUE 

370-500 mL) 

injection 

80 mL                                                

23:15:

00                                       

22:16

:00        No                    [id number]  80mL                  80 mL, 

Intravenou

s, ONCE, 1 

dose, On 

23 at 

1815, 

Routine                                                     [facility]

 

                                                    TAKE 1 

TABLET 

TWICE DAILY 

AFTER 

MEALS.                                               

00:00:

00                                       

00:00

:00     No                      500                                     [facility]

 

                                                    TAKE 1 

TABLET 

DAILY.                                               

00:00:

00                                       

00:00

:00     No                      50                                      [facility]

 

                                                    TAKE 1 

TABLET FOR 

MIGRAINE 

RELIEF. MAY 

REPEAT 

EVERY 2 

HOURS. MAX 

200MG/DAY.                                           

00:00:

00                                       

00:00

:00     No                      50                                      [facility]

 

                                                    TAKE 2 TABS 

DAILY THE 

FIRST 5 

DAYS,THEN 1 

TAB DAILY 

THE LAST 5 

DAYS                                                 

00:00:

00                                       

00:00

:00     No                      20                                      [facility]

 

                                                    dexamethaso

ne 

(DECADRON 

PHOSPHATE) 

injection 

10 mg                                                

18:15:

00                                       

17:37

:00        No                               10mg                  10 mg, 

Oral, 

ONCE, 1 

dose, On 

23 at 

1315, 

Routine                                                     [facility]

 

                                                    ketorolac 

(TORADOL) 

injection 

30 mg                                                

18:00:

00                                       

17:37

:00        No                               30mg                  30 mg, 

Intramuscu

lar, ONCE, 

1 dose, On 

23 at 

1300, 

Routine                                                     [facility]

[facility]

 

                                                    maalox:diph

enhydrAMINE

:lidocaine 

2 % viscous 

1:1:1 

(FIRST-MOUT

HWASH BLM) 

oral 

suspension 

15 mL                                                

17:15:

00                                       

17:37

:00        No                               15mL                  15 mL, 

Oral, 

ONCE, 1 

dose, On 

23 at 

1215, 

Routine                                                     [facility]

[facility]

 

                                                    ibuprofen 

800 mg 

tablet                                               

00:00:

00                  Yes                 [id number] 800mg               Take 1 

tablet by 

mouth 

every 6 

(six) 

hours as 

needed for 

Pain 

(scale 

4-6).                                                       [facility]

 

                                                    TAKE ONE 

(1) 

TABLET(S) 

BY MOUTH 

EVERY SIX 

HOURS AS 

NEEDED FOR 

PAIN.                                                

00:00:

00                                       

00:00

:00     [facility]

 

                                                    ketorolac 

(TORADOL) 

injection 

15 mg                                               2023-0

3-13 

20:30:

00                                       

19:57

:00        No                               15mg                  15 mg, 

Intramuscu

lar, ONCE, 

1 dose, On 

Mon 

3/13/23 at 

1530, 

Routine                                                     [facility]

[facility]

 

                                                    TAKE 1 

CAPSULE 

EVERY 8 

HOURS AS 

NEEDED FOR 

COUGH                                               

2 

00:00:

00                                       

00:00

:00     No                      200                                     [facility]

 

                                                    TAKE ONE 

(1) 

TABLET(S) 

BY MOUTH 

ONCE. MAY 

REPEAT IN 

72 HOURS IF 

STILL 

SYMPTOMATIC

.                                                   

1-07 

00:00:

00                                       

00:00

:00     [facility]

 

                                                    AMOX/K CLAV 

875-125                                             2022

2- 

00:00:

00                                       

00:00

:00     [facility]

 

                                                    Dose 

Unknown                                             2022- 

00:00:

00                                       

00:00

:00     [facility]

 

                                                    AZITHROMYCI

N 250MG                                             2022 

00:00:

00                                       

00:00

:00     [facility]

 

                                                    NITROFURANT

N CAP 100MG                                         2022 

00:00:

00                                       

00:00

:00     No                                                              [facility]

 

                                                    Dose 

Unknown                                             2022 

00:00:

00                                       

00:00

:00     No                                                              [facility]

 

                                                    Dose 

Unknown                                             2022 

00:00:

00                                       

00:00

:00     [facility]

 

                                                    TAKE ONE 

(1) 

CAPSULE(S) 

BY MOUTH 

EVERY 

NIGHT.                                              2022 

00:00:

00                                       

00:00

:00     No                                                              [facility]

 

                                                    Dose 

Unknown                                             2022 

00:00:

00                                       

00:00

:00     No                                                              [facility]

 

                                                    Dose 

Unknown                                             2022 

00:00:

00                                       

00:00

:00     No                                                              [facility]

 

                                                    TAKE ONE 

(1) 

TABLET(S) 

BY MOUTH 

THREE TIMES 

A DAY.                                              2022 

00:00:

00                                       

00:00

:00     No                                                              Melvin Benitez

 

                                                    Dose 

Unknown                                             2022 

00:00:

00                                       

00:00

:00     Brie Benitez

 

                                                    AMOX/K CLAV 

[id number]                                             2022 

00:00:

00                                       

00:00

:00     Brie Benitze

 

                                                    BENZONATATE 

CAP 100MG                                           2022 

00:00:

00                                       

00:00

:00     No                                                              Melvin Bentiez

 

                                                    Dose 

Unknown                                             2022 

00:00:

00                                       

00:00

:00     Brie Benitez

 

                                                    TAKE ONE 

(1) 

TEASPOONFUL

(S) BY 

MOUTH FOUR 

TIMES A DAY 

AS NEEDED 

FOR 

CONGESTION 

OR 

ALLERGIES.                                          2022 

00:00:

00                                       

00:00

:00     No                                                              Melvin Benitez

 

                                                    Dose 

Unknown                                             2022 

00:00:

00                                       

00:00

:00     No                                                              Mlevin Benitez

 

                                                    Dose 

Unknown                                             2022 

00:00:

00                                       

00:00

:00     No                                                              Melvin Benitez

 

                                                    ondansetron 

4 mg 

disintegrat

ing tablet                                          2022

011 

00:00:

00                                      2022-

10-17 

04:59

:00        No                    [id number]    4mg                   Take 1 

tablet by 

mouth 

every 8 

(eight) 

hours as 

needed for 

Nausea and 

Vomiting 

(N/V) for 

up to 5 

days.                                                       [facility]

 

                                                    TAKE 10 ML 

BY MOUTH 

EVERY 6 

HOURS AS 

NEEDED FOR 

COUGH                                                

00:00:

00            Yes                                                     Melvin Benitez

 

                                                    TAKE 10 ML 

BY MOUTH 

EVERY 6 

HOURS AS 

NEEDED FOR 

COUGH                                                

00:00:

00            No                                                      

 

                                                    TAKE 10 ML 

BY MOUTH 

EVERY 6 

HOURS AS 

NEEDED FOR 

COUGH                                                

00:00:

00            No                                                      

 

                                                    TAKE ONE 

(1) 

TABLET(S) 

BY MOUTH 

ONCE A DAY.                                          

00:00:

00            Yes                                                     Melvin Benitez

 

                                                    TAKE 1 

TABLET BY 

MOUTH EVERY 

DAY                                                  

00:00:

00            Yes                                                     Melvin Benitez

 

                                &lt                              

00:00:

00            Yes                  250                                Melvin Benitez

 

                                                    Dose 

Unknown                                              

00:00:

00            Yes                                                     Melvin Benitez

 

                                                    TAKE ONE 

(1) 

TABLET(S) 

BY MOUTH 

EVERY SIX 

HOURS AS 

NEEDED FOR 

PAIN.                                                

00:00:

00            Yes                                                     Melvin Benitez

 

                                                    Dose 

Unknown                                              

00:00:

00            Yes                                                     Melvin Benitez

 

                                                    TAKE ONE 

(1) TABLET 

BY MOUTH 

ONCE DAILY.                                          

00:00:

00            No                   10                                 

 

                                                    TAKE 1 

TABLET BY 

MOUTH EVERY 

DAY                                                  

00:00:

00            No                                                      

 

                                &lt                              

00:00:

00            No                   250                                

 

                                                    Dose 

Unknown                                              

00:00:

00            No                                                      

 

                                                    TAKE ONE 

(1) 

TABLET(S) 

BY MOUTH 

EVERY SIX 

HOURS AS 

NEEDED FOR 

PAIN.                                                

00:00:

00            No                                                      

 

                                                    Dose 

Unknown                                              

00:00:

00            No                                                      

 

                                                    TAKE ONE 

(1) TABLET 

BY MOUTH 

ONCE DAILY.                                          

00:00:

00            No                   10                                 

 

                                                    TAKE 1 

TABLET BY 

MOUTH EVERY 

DAY                                                  

00:00:

00            No                                                      

 

                                &lt                              

00:00:

00            No                   250                                

 

                                                    Dose 

Unknown                                              

00:00:

00            No                                                      

 

                                                    TAKE ONE 

(1) 

TABLET(S) 

BY MOUTH 

EVERY SIX 

HOURS AS 

NEEDED FOR 

PAIN.                                               0

8-09 

00:00:

00            No                                                      

 

                                                    Dose 

Unknown                                             0

8-09 

00:00:

00            No                                                      

 

                                                    Dose 

Unknown                                             0

8-05 

00:00:

00            Yes                                                     Melvin Benitez

 

                                                    TAKE 1 

CAPSULE BY 

MOUTH EVERY 

8 HOURS                                             0

8-05 

00:00:

00            No                   500                                

 

                                                    TAKE 1 

CAPSULE BY 

MOUTH EVERY 

8 HOURS                                             0

8-05 

00:00:

00            No                   500                                

 

                                                    TAKE 1 

TABLET BY 

MOUTH EVERY 

8 HOURS AS 

NEEDED                                              0

 

00:00:

00            Yes                                                     Melvin Benitez

 

                                &lt                             0

 

00:00:

00            Yes                                                     Melvin Benitez

 

                                                    Dose 

Unknown                                              

00:00:

00            Yes                                                     Melvin Benitez

 

                                                    TAKE 1 

TABLET BY 

MOUTH EVERY 

8 HOURS AS 

NEEDED                                              0

 

00:00:

00            No                                                      

 

                                &lt                             0

 

00:00:

00            No                                                      

 

                                                    Dose 

Unknown                                             0

 

00:00:

00            No                                                      

 

                                                    TAKE 1 

TABLET BY 

MOUTH EVERY 

8 HOURS AS 

NEEDED                                              0

 

00:00:

00            No                                                      

 

                                &lt                             0

 

00:00:

00            No                                                      

 

                                                    Dose 

Unknown                                             0

 

00:00:

00            No                                                      

 

                                                    Dose 

Unknown                                             0

 

00:00:

00            Yes                                                     Melvin Benitez

 

                                                    Dose 

Unknown                                              

00:00:

00            Yes                                                     Melvin Benitez

 

                                                    TAKE 1 

CAPSULE BY 

MOUTH EVERY 

8 HOURS                                             0

 

00:00:

00            Yes                                                     Melvni Benitez

 

                                                    TAKE ONE 

(1) 

TEASPOONFUL

(S) BY 

MOUTH FOUR 

TIMES A DAY 

AS NEEDED 

FOR 

CONGESTION 

OR 

ALLERGIES.                                           

00:00:

00            Yes                                                     Melvin Benitez

 

                                                    TAKE ONE 

(1) TABLET 

BY MOUTH 

ONCE DAILY.                                          

00:00:

00            Yes                                                     Melvin Benitez

 

                                &lt                             0

 

00:00:

00            Yes                                                     Melvin Benitez

 

                                                    ibuprofen 

800 mg 

tablet                                               

00:00:

00            No                   1mg                                

 

                                                    cyclobenzap

rine 5 mg 

tablet                                               

00:00:

00            No                   1mg                                

 

                                                    TAKE 1 

CAPSULE BY 

MOUTH EVERY 

8 HOURS                                             0

 

00:00:

00            No                                                      

 

                                                    TAKE ONE 

(1) 

TEASPOONFUL

(S) BY 

MOUTH FOUR 

TIMES A DAY 

AS NEEDED 

FOR 

CONGESTION 

OR 

ALLERGIES.                                           

00:00:

00            No                                                      

 

                                                    TAKE ONE 

(1) TABLET 

BY MOUTH 

ONCE DAILY.                                          

00:00:

00            No                                                      

 

                                &lt                              

00:00:

00            No                                                      

 

                                                    ibuprofen 

800 mg 

tablet                                               

00:00:

00            No                   1mg                                

 

                                                    cyclobenzap

rine 5 mg 

tablet                                               

00:00:

00            No                   1mg                                

 

                                                    TAKE 1 

CAPSULE BY 

MOUTH EVERY 

8 HOURS                                              

00:00:

00            No                                                      

 

                                                    TAKE ONE 

(1) 

TEASPOONFUL

(S) BY 

MOUTH FOUR 

TIMES A DAY 

AS NEEDED 

FOR 

CONGESTION 

OR 

ALLERGIES.                                           

00:00:

00            No                                                      

 

                                                    TAKE ONE 

(1) TABLET 

BY MOUTH 

ONCE DAILY.                                          

00:00:

00            No                                                      

 

                                &lt                              

00:00:

00            No                                                      

 

                                                    bromphenira

mine-pseudo

ephedrine-D

M (BROMFED 

DM) 2-30-10 

mg/5 mL 

syrup                                                

00:00:

00                  Yes                 [id number]  5mL                 Take 5 mL 

by mouth 4 

(four) 

times 

daily as 

needed for 

Congestion

/Allergies

.                                                           [facility]

 

                                                    benzonatate 

100 mg 

capsule                                              

00:00:

00                  Yes                 [id number]  200mg               Take 2 

capsules 

by mouth 

every 8 

(eight) 

hours as 

needed for 

Cough.                                                      [facility]

 

                                                    amoxicillin 

500 mg 

capsule                                              

00:00:

00            Yes                  1mg                                Melvin Benitez

 

                                                    Dose 

Unknown                                              

00:00:

00            Yes                                                     Melvin Benitez

 

                                                    amoxicillin 

500 mg 

capsule                                              

00:00:

00            No                   1mg                                

 

                                                    Dose 

Unknown                                              

00:00:

00            No                                                      

 

                                                    amoxicillin 

500 mg 

capsule                                              

00:00:

00            No                   1mg                                

 

                                                    Dose 

Unknown                                              

00:00:

00            No                                                      

 

                                                    Macrobid 

100 mg 

capsule                                              

00:00:

00            Yes                  1mg                                Melvin Benitez

 

                                                    Macrobid 

100 mg 

capsule                                              

00:00:

00            No                   1mg                                

 

                                                    Macrobid 

100 mg 

capsule                                             2022-0

5-21 

00:00:

00            No                   1mg                                

 

                                                    Dose 

Unknown                                             2022-0

5-12 

00:00:

00            Yes                                                     Melvin Benitez

 

                                                    Dose 

Unknown                                             2022-0

5-12 

00:00:

00            No                                                      

 

                                                    Dose 

Unknown                                             2022-0

5-12 

00:00:

00            No                                                      

 

                                                    Dose 

Unknown                                             2022-0

5-12 

00:00:

00            No                                                      

 

                                                    Dose 

Unknown                                             2022-0

5-12 

00:00:

00            No                                                      

 

                                                    Dose 

Unknown                                             2022-0

5-12 

00:00:

00            No                                                      

 

                                                    Dose 

Unknown                                             2022-0

5-12 

00:00:

00            No                                                      

 

                                                    Dose 

Unknown                                             2022-0

5-12 

00:00:

00            No                                                      

 

                                                    Dose 

Unknown                                             2022-0

5-12 

00:00:

00            No                                                      

 

                                                    Dose 

Unknown                                             2022-0

5-12 

00:00:

00            No                                                      

 

                                                    Dose 

Unknown                                             2022-0

5-12 

00:00:

00            No                                                      

 

                                                    Dose 

Unknown                                             2022-0

5-12 

00:00:

00            No                                                      

 

                                                    Dose 

Unknown                                             2022-0

5-12 

00:00:

00            No                                                      

 

                                                    Dose 

Unknown                                             2022-0

5-12 

00:00:

00            No                                                      

 

                                                    Dose 

Unknown                                             2022-0

5-12 

00:00:

00            No                                                      

 

                                                    Dose 

Unknown                                             2022-0

5-04 

00:00:

00            Yes                                                     Melvin Benitez

 

                                                    Dose 

Unknown                                             2022-0

5-04 

00:00:

00            Yes                                                     Melvin Benitez

 

                                                    Dose 

Unknown                                             2022-0

5-04 

00:00:

00            Yes                                                     Melvin Benitez

 

                                                    Dose 

Unknown                                             2022-0

5-04 

00:00:

00            Yes                                                     Melvin Benitez

 

                                                    Dose 

Unknown                                             2022-0

5-04 

00:00:

00            Yes                                                     Melvin Benitez

 

                                                    Dose 

Unknown                                             2022-0

5-04 

00:00:

00            Yes                                                     Melvin Benitez

 

                                                    Dose 

Unknown                                             2022-0

5-04 

00:00:

00            No                                                      

 

                                                    Dose 

Unknown                                             2022-0

5-04 

00:00:

00            No                                                      

 

                                                    Dose 

Unknown                                             2022-0

5-04 

00:00:

00            No                                                      

 

                                                    Dose 

Unknown                                             2022-0

5-04 

00:00:

00            No                                                      

 

                                                    Dose 

Unknown                                             2022-0

5-04 

00:00:

00            No                                                      

 

                                                    Dose 

Unknown                                             2022-0

5-04 

00:00:

00            No                                                      

 

                                                    Dose 

Unknown                                             2022-0

5-04 

00:00:

00            No                                                      

 

                                                    Dose 

Unknown                                             2022-0

5-04 

00:00:

00            No                                                      

 

                                                    Dose 

Unknown                                             2022-0

5-04 

00:00:

00            No                                                      

 

                                                    Dose 

Unknown                                             2022-0

5-04 

00:00:

00            No                                                      

 

                                                    Dose 

Unknown                                             2022-0

5- 

00:00:

00            No                                                      

 

                                                    Dose 

Unknown                                             -0

5- 

00:00:

00            No                                                      

 

                                                    Lexapro 10 

mg tablet                                           0

 

00:00:

00            Yes                  1mg                                Melvin Benitez

 

                                                    Latuda 20 

mg tablet                                           0

 

00:00:

00            Yes                  1mg                                Melvin Benitez

 

                                                    prazosin 1 

mg capsule                                          0

 

00:00:

00            Yes                  1mg                                [facility]

 

                                                    Lexapro 10 

mg tablet                                           0

 

00:00:

00            No                   1mg                                

 

                                                    Latuda 20 

mg tablet                                           0

 

00:00:

00            No                   1mg                                

 

                                                    prazosin 1 

mg capsule                                          0

 

00:00:

00            No                   1mg                                

 

                                                    Lexapro 10 

mg tablet                                           0

 

00:00:

00            No                   1mg                                

 

                                                    Latuda 20 

mg tablet                                           0

 

00:00:

00            No                   1mg                                

 

                                                    prazosin 1 

mg capsule                                          0

 

00:00:

00            No                   1mg                                

 

                                                    Latuda 20 

mg tablet                                           0

 

00:00:

00            Yes                  1mg                                [facility]

 

                                                    Lexapro 10 

mg tablet                                           0

 

00:00:

00            Yes                  1mg                                [facility]

 

                                                    prazosin 1 

mg capsule                                          0

 

00:00:

00            Yes                  1mg                                [facility]

 

                                                    Latuda 20 

mg tablet                                           0

 

00:00:

00            No                   1mg                                

 

                                                    Lexapro 10 

mg tablet                                           0

 

00:00:

00            No                   1mg                                

 

                                                    prazosin 1 

mg capsule                                          0

- 

00:00:

00            No                   1mg                                

 

                                                    Latuda 20 

mg tablet                                           0

- 

00:00:

00            No                   1mg                                

 

                                                    Lexapro 10 

mg tablet                                           0

 

00:00:

00            No                   1mg                                

 

                                                    prazosin 1 

mg capsule                                          0

- 

00:00:

00            No                   1mg                                

 

                                                    azithromyci

n 250 mg 

tablet                                              2021- 

00:00:

00            Yes                  mg                                 [facility]

 

                                                    Bromfed DM 

2 mg-30 

mg-10 mg/5 

mL oral 

syrup                                               2021- 

00:00:

00                        Yes                                    10mg/5 

mL                                                               [facility]

 

                                                    azithromyci

n 250 mg 

tablet                                              2021- 

00:00:

00            No                   mg                                 

 

                                                    Bromfed DM 

2 mg-30 

mg-10 mg/5 

mL oral 

syrup                                               2021 

00:00:

00                        No                                     10mg/5 

mL                                                               

 

                                                    azithromyci

n 250 mg 

tablet                                              2021 

00:00:

00            No                   mg                                 

 

                                                    Bromfed DM 

2 mg-30 

mg-10 mg/5 

mL oral 

syrup                                               2021 

00:00:

00                        No                                     10mg/5 

mL                                                               

 

                                                    prednisone 

20 mg 

tablet                                              2021 

00:00:

00            Yes                  1mg                                [facility]

 

                                                    ibuprofen 

800 mg 

tablet                                              2021 

00:00:

00            Yes                  1mg                                [facility]

 

                                                    prednisone 

20 mg 

tablet                                              2021 

00:00:

00            No                   1mg                                

 

                                                    ibuprofen 

800 mg 

tablet                                              2021 

00:00:

00            No                   1mg                                

 

                                                    prednisone 

20 mg 

tablet                                              2021 

00:00:

00            No                   1mg                                

 

                                                    ibuprofen 

800 mg 

tablet                                              2021 

00:00:

00            No                   1mg                                

 

                                                    sulfamethox

azole 800 

mg-trimetho

prim 160 mg 

tablet                                               

00:00:

00            Yes                  1mg                                [facility]

 

                                                    sulfamethox

azole 800 

mg-trimetho

prim 160 mg 

tablet                                               

00:00:

00            No                   1mg                                

 

                                                    sulfamethox

azole 800 

mg-trimetho

prim 160 mg 

tablet                                               

00:00:

00            No                   1mg                                

 

                                                    pseudoephed

rine 30 mg 

tablet                                              2020

0 

00:00:

00            Yes                  12mg                               [facility]

 

                                                    pseudoephed

rine 30 mg 

tablet                                              2020

0 

00:00:

00            No                   12mg                               

 

                                                    pseudoephed

rine 30 mg 

tablet                                              2020

0 

00:00:

00            No                   12mg                               

 

                                                    metronidazo

le 500 mg 

tablet                                               

00:00:

00            Yes                  1mg                                [facility]

 

                                                    metronidazo

le 500 mg 

tablet                                               

00:00:

00            No                   1mg                                

 

                                                    metronidazo

le 500 mg 

tablet                                               

00:00:

00            No                   1mg                                

 

                                                    Clotrimazol

e (LOTRIMIN 

AF) 1 % 

Lotn                                                2013 

00:00:

00                  Yes                                               Apply to 

area(s) 2 

(two) 

times 

daily as 

needed for 

Rash or 

Itching.                                                    [facility]

 

                                                    Salicylic 

Acid 

(SELSUN 

BLUE 

NATURALS) 3 

% Sham                                              2013 

00:00:

00                  Yes                                               Apply to 

area(s) 

daily.                                                      [facility]







Immunizations





                                                    Ordered 

Immunization Name                       Filled 

Immunization Name Date            Status          Comments        Source

 

                                                    Influenza, 

injectable, Madin 

[place] Canine 

Kidney, 

preservative-free, 

quadrivalent                            Influenza, 

injectable, Madin 

[place] Canine 

Kidney, 

preservative-free, 

quadrivalent                            2024 

00:00:00            Completed                               [facility]

 

                                                    Influenza Virus 

Vaccine Quad IM, 

Preserv and ABX 

Free 6 MO-64 YRS 

(FLUCELVAX)                                         2023 

00:00:00            Completed                               [facility]

 

                                                    SARS-COV-2 COVID-19 

VACCINE - (MODERNA)                                 2022 

00:00:00            Completed                               [facility]

 

                                                    SARS-COV-2 COVID-19 

VACCINE - (MODERNA)                                 2022 

00:00:00            Completed                               [facility]

 

                                                    SARS-COV-2 COVID-19 

VACCINE - (MODERNA)                                 2022 

00:00:00            Completed                               [facility]

 

                                                    SARS-COV-2 COVID-19 

VACCINE - (MODERNA)                                 2022 

00:00:00            Completed                               [facility]

 

                                                    SARS-COV-2 COVID-19 

VACCINE - (MODERNA)                                 2022 

00:00:00            Completed                               [facility]

 

                                                    Influenza, 

seasonal, inj                           Influenza, 

seasonal, inj                           2021 

00:00:00            Completed                               [facility]

 

                                                    Influenza, 

seasonal, inj                           Influenza, 

seasonal, inj                           2021 

00:00:00            Completed                               [facility]

 

                                                    Influenza Virus 

Vaccine - Whole                                     2021 

00:00:00            Completed                               [facility]

 

                                                    Influenza Virus 

Vaccine - Whole                                     2021 

00:00:00            Completed                               [facility]

 

                                                    Influenza Virus 

Vaccine - Whole                                     2021 

00:00:00            Completed                               [facility]

 

                                                    Influenza Virus 

Vaccine - Whole                                     2021 

00:00:00            Completed                               [facility]

 

                                                    Influenza Virus 

Vaccine - Whole                                     2021 

00:00:00            Completed                               

 

                                                    Influenza, 

seasonal, inj                                       2021 

00:00:00            Completed                               

 

                                                    Influenza, 

seasonal, inj                                       2021 

00:00:00            Completed                               

 

                                                    SARS-COV-2 COVID-19 

VACCINE - (MODERNA)                                 2021 

00:00:00            Completed                               [facility]

 

                                                    SARS-COV-2 COVID-19 

VACCINE - (MODERNA)                                 2021 

00:00:00            Completed                               [facility]

 

                                                    SARS-COV-2 COVID-19 

VACCINE - (MODERNA)                                 2021 

00:00:00            Completed                               [facility]

 

                                                    SARS-COV-2 COVID-19 

VACCINE - (MODERNA)                                 2021 

00:00:00            Completed                               [facility]

 

                                                    SARS-COV-2 COVID-19 

VACCINE - (MODERNA)                                 2021 

00:00:00            Completed                               

 

                                HPV9            HPV9            2020 

00:00:00            Completed                               [facility]

 

                                HPV9            HPV9            2020 

00:00:00            Completed                               [facility]

 

                                HPV9                            2020 

00:00:00            Completed                               

 

                                HPV9                            2020 

00:00:00            Completed                               

 

                                                    SARS-COV-2 COVID-19 

VACCINE - (MODERNA)              Unknown      Completed                 [facility]

 

                                                    Influenza Virus 

Vaccine - Whole              Unknown      Completed                 [facility]

 

                                                    SARS-COV-2 COVID-19 

VACCINE - (MODERNA)              Unknown      Completed                 [facility]

 

                                                    Influenza Virus 

Vaccine - Whole              Unknown      Completed                 [facility]

 

                                                    SARS-COV-2 COVID-19 

VACCINE - (MODERNA)              Unknown      Completed                 [facility]

 

                                                    Influenza Virus 

Vaccine - Whole              Unknown      Completed                 [facility]

 

                                                    Influenza Virus 

Vaccine Quad IM, 

Preserv and ABX 

Free 6 MO-64 YRS 

(FLUCELVAX)               Unknown      Completed                 [facility]

 

                                                    SARS-COV-2 COVID-19 

VACCINE - (MODERNA)              Unknown      Completed                 [facility]

 

                                                    Influenza Virus 

Vaccine - Whole              Unknown      Completed                 [facility]

 

                                                    Influenza Virus 

Vaccine Quad IM, 

Preserv and ABX 

Free 6 MO-64 YRS 

(FLUCELVAX)               Unknown      Completed                 [facility]

 

                                                    SARS-COV-2 COVID-19 

VACCINE - (MODERNA)              Unknown      Completed                 [facility]

 

                                                    Influenza Virus 

Vaccine - Whole              Unknown      Completed                 [facility]

 

                                                    Influenza Virus 

Vaccine Quad IM, 

Preserv and ABX 

Free 6 MO-64 YRS 

(FLUCELVAX)               Unknown      Completed                 [facility]







Vital Signs





                      Vital Name Observation Time Observation Value Comments   S

[facility]

 

                                                    Systolic blood 

pressure        2024 14:22:00 146 mm[Hg]                      [facility]

 

                                                    Diastolic blood 

pressure        2024 14:22:00 92 mm[Hg]                       [facility]

 

                      Heart rate 2024 14:22:00 127 /min              [facility]

 

                      Body temperature 2024 14:22:00 37.39 Tricia            

 [facility]

 

                      Respiratory rate 2024 14:22:00 16 /min              

 [facility]

 

                      Body height 2024 14:22:00 149.9 cm              [facility]

 

                      Body weight 2024 14:22:00 95.709 kg             [facility]

 

                      BMI        2024 14:22:00 42.62 kg/m2            [facility]

 

                                                    Oxygen saturation in 

Arterial blood by 

Pulse oximetry  2024 14:22:00 100 /min                        [facility]

 

                                                    Systolic blood 

pressure        2024 13:54:00 139 mm[Hg]                      [facility]

 

                                                    Diastolic blood 

pressure        2024 13:54:00 87 mm[Hg]                       [facility]

 

                      Heart rate 2024 13:54:00 79 /min               [facility]

[facility]

 

                      Body temperature 2024 13:54:00 37.28 Tricia            

 [facility]

 

                      Respiratory rate 2024 13:54:00 14 /min              

 [facility]

 

                      Body height 2024 13:54:00 149.9 cm              [facility]

 

                      Body weight 2024 13:54:00 97.977 kg             [facility]

 

                      BMI        2024 13:54:00 43.63 kg/m2            [facility]

 

                                                    Oxygen saturation in 

Arterial blood by 

Pulse oximetry  2024 13:54:00 100 /min                        [facility]

 

                                                    Systolic blood 

pressure        2023 15:52:00 124 mm[Hg]                      [facility]

 

                                                    Diastolic blood 

pressure        2023 15:52:00 87 mm[Hg]                       [facility]

 

                      Heart rate 2023 15:52:00 81 /min               [facility]

[facility]

 

                      Body temperature 2023 15:52:00 37.06 Tricia            

 [facility]

 

                      Respiratory rate 2023 15:52:00 16 /min              

 [facility]

 

                      Body height 2023 15:52:00 149.9 cm              [facility]

[facility]

 

                      Body weight 2023 15:52:00 95.851 kg             [facility]

[facility]

 

                      BMI        2023 15:52:00 42.68 kg/m2            [facility]

[facility]

 

                                                    Oxygen saturation in 

Arterial blood by 

Pulse oximetry  2023 15:52:00 98 /min                         [facility]

 

                                                    Systolic blood 

pressure        2023 16:38:00 155 mm[Hg]                      [facility]

 

                                                    Diastolic blood 

pressure        2023 16:38:00 80 mm[Hg]                       [facility]

 

                      Heart rate 2023 16:38:00 90 /min               [facility]

[facility]

 

                      Body temperature 2023 16:38:00 37.39 Tricia            

 [facility]

 

                      Respiratory rate 2023 16:38:00 18 /min              

 [facility]

 

                      Body weight 2023 16:38:00 102.059 kg            [facility]

 

                      BMI        2023 16:38:00 45.44 kg/m2            [facility]

 

                                                    Oxygen saturation in 

Arterial blood by 

Pulse oximetry  2023 16:38:00 99 /min                         [facility]

 

                                                    Systolic blood 

pressure        2023 18:57:00 135 mm[Hg]                      [facility]

 

                                                    Diastolic blood 

pressure        2023 18:57:00 74 mm[Hg]                       [facility]

 

                      Heart rate 2023 18:57:00 77 /min               [facility]

[facility]

 

                      Body temperature 2023 18:57:00 37.11 Tricia            

 [facility]

 

                      Respiratory rate 2023 18:57:00 18 /min              

 [facility]

 

                      Body weight 2023 18:57:00 102.059 kg            [facility]

 

                      BMI        2023 18:57:00 45.44 kg/m2            [facility]

[facility]

 

                                                    Oxygen saturation in 

Arterial blood by 

Pulse oximetry  2023 18:57:00 99 /min                         [facility]

 

                                                    Systolic blood 

pressure        2023 16:46:00 136 mm[Hg]                      [facility]

 

                                                    Diastolic blood 

pressure        2023 16:46:00 76 mm[Hg]                       [facility]

 

                      Heart rate 2023 16:45:00 77 /min               [facility]

[facility]

 

                      Body temperature 2023 16:45:00 36.72 Tricia            

 [facility]

 

                      Respiratory rate 2023 16:45:00 18 /min              

 [facility]

 

                      Body height 2023 16:45:00 149.9 cm              [facility]

[facility]

 

                      Body weight 2023 16:45:00 102.059 kg            [facility]

[facility]

 

                      BMI        2023 16:45:00 45.44 kg/m2            [facility]

[facility]

 

                                                    Oxygen saturation in 

Arterial blood by 

Pulse oximetry  2023 16:45:00 98 /min                         [facility]

 

                                                    Systolic blood 

pressure        2022-10-11 17:06:00 132 mm[Hg]                      [facility]

 

                                                    Diastolic blood 

pressure        2022-10-11 17:06:00 85 mm[Hg]                       [facility]

 

                      Heart rate 2022-10-11 17:06:00 111 /min              [facility]

[facility]

 

                      Body temperature 2022-10-11 17:06:00 37 Tricia               

 [facility]

 

                      Respiratory rate 2022-10-11 17:06:00 16 /min              

 [facility]

 

                      Body height 2022-10-11 17:06:00 149.9 cm              [facility]

[facility]

 

                      Body weight 2022-10-11 17:06:00 107.049 kg            [facility]

 

                      BMI        2022-10-11 17:06:00 47.67 kg/m2            Univ

[facility]

 

                                                    Oxygen saturation in 

Arterial blood by 

Pulse oximetry  2022-10-11 17:06:00 100 /min                        [facility]

 

                      BP Systolic 2024 11:07:00 113 mm[Hg]            Step

hen F Emmanuel

 

                      BP Diastolic 2024 11:07:00 63 mm[Hg]             Julius

phen F Emmanuel

 

                      Weight Measured 2024 11:07:00 212.80 pounds         

   Melvin F Emmanuel

 

                      Height Measured 2024 11:07:00 59.00 inches          

  Melvin F Emmanuel

 

                      Body Temperature 2024 11:07:00 98.00 degrees        

    Melvin F Emmanuel

 

                      Heart Rate 2024 11:07:00 63.00 /min            Noemi

en F Emmanuel

 

                      Respiratory Rate 2024 11:07:00 18.00 /min           

 Melvin F Emmanuel

 

                      BP Systolic 2024 11:00:00 130 mm[Hg]            Step

hen F Emmanuel

 

                      BP Diastolic 2024 11:00:00 72 mm[Hg]             Julius

phen F Emmanuel

 

                      Weight Measured 2024 11:00:00 217.20 pounds         

   Melvin F Emmanuel

 

                      Height Measured 2024 11:00:00 59.00 inches          

  Melvin F Emmanuel

 

                      Body Temperature 2024 11:00:00 97.30 degrees        

    Melvin F Emmanuel

 

                      Heart Rate 2024 11:00:00 67.00 /min            Noemi

en F Emmanuel

 

                      Respiratory Rate 2024 11:00:00 19.00 /min           

 Melvin F Emmanuel

 

                      BP Systolic 2024 14:02:00 109 mm[Hg]            Step

hen F Emmanuel

 

                      BP Diastolic 2024 14:02:00 74 mm[Hg]             Julius

phen F Emmanuel

 

                      Weight Measured 2024 14:02:00 211.80 pounds         

   Melvin F Emmanuel

 

                      Height Measured 2024 14:02:00 59.00 inches          

  Melvin F Emmanuel

 

                      Body Temperature 2024 14:02:00 98.10 degrees        

    Melvin F Emmanuel

 

                      Heart Rate 2024 14:02:00 104.00 /min            Step

hen F Emmanuel

 

                      Respiratory Rate 2024 14:02:00 20.00 /min           

 Melvin F Emmanuel

 

                      BP Systolic 2023 10:54:00 112 mm[Hg]            Step

hen F Emmanuel

 

                      BP Diastolic 2023 10:54:00 67 mm[Hg]             Julius

phen F Emmnauel

 

                      Weight Measured 2023 10:54:00                       

Melvin F Emmanuel

 

                      Height Measured 2023 10:54:00                       

Melvin F Emmanuel

 

                      Body Temperature 2023 10:54:00 98.60 degrees        

    Melvin F Emmanuel

 

                      Heart Rate 2023 10:54:00 80.00 /min            Noemi

en F Emmanuel

 

                      Respiratory Rate 2023 10:54:00                      

 Melvin F Emmanuel

 

                      BP Systolic 2023 09:20:00 126 mm[Hg]            Step

hen F Emmanuel

 

                      BP Diastolic 2023 09:20:00 83 mm[Hg]             Julius

phen F Emmanuel

 

                      Weight Measured 2023 09:20:00 210.60 pounds         

   Melvin F Emmanuel

 

                      Height Measured 2023 09:20:00 59.00 inches          

  Melvin F Emmanuel

 

                      Body Temperature 2023 09:20:00 97.70 degrees        

    Melvin F Emmanuel

 

                      Heart Rate 2023 09:20:00 80.00 /min            Noemi

en F Emmanuel

 

                      Respiratory Rate 2023 09:20:00                      

 Melvin F Emmanuel

 

                      BP Systolic 2023 16:15:00 134 mm[Hg]            Step

hen F Emmanuel

 

                      BP Diastolic 2023 16:15:00 79 mm[Hg]             Julius

phen F Emmanuel

 

                      Weight Measured 2023 16:15:00 166.60 pounds         

   Melvin F Emmanuel

 

                      Height Measured 2023 16:15:00 59.00 inches          

  Melvin F Emmanuel

 

                      Body Temperature 2023 16:15:00 98.10 degrees        

    Melvin F Emmanuel

 

                      Heart Rate 2023 16:15:00 95.00 /min            Noemi

en F Emmanuel

 

                      Respiratory Rate 2023 16:15:00 18.00 /min           

 Melvin F Emmanuel

 

                      BP Systolic 2022-12-15 13:25:00 111 mm[Hg]            Step

hen F Emmanuel

 

                      BP Diastolic 2022-12-15 13:25:00 73 mm[Hg]             Julisu

phen F Emmanuel

 

                      Weight Measured 2022-12-15 13:25:00 220.60 pounds         

   Melvin F Emmanuel

 

                      Height Measured 2022-12-15 13:25:00 59.00 inches          

  Melvin F Emmanuel

 

                      Body Temperature 2022-12-15 13:25:00 98.00 degrees        

    Melvin F Emmanuel

 

                      Heart Rate 2022-12-15 13:25:00 96.00 /min            Noemi

en F Emmanuel

 

                      Respiratory Rate 2022-12-15 13:25:00                      

 Melvin F Emmanuel

 

                      BP Systolic 2022-10-21 10:17:00 117 mm[Hg]            Step

hen F Emmanuel

 

                      BP Diastolic 2022-10-21 10:17:00 88 mm[Hg]             Julisu

phen F Emmanuel

 

                      Weight Measured 2022-10-21 10:17:00 220.60 pounds         

   Melvin F Emmanuel

 

                      Height Measured 2022-10-21 10:17:00 59.00 inches          

  Melvin F Emmanuel

 

                      Body Temperature 2022-10-21 10:17:00 98.80 degrees        

    Melvin F Emmanuel

 

                      Heart Rate 2022-10-21 10:17:00 97.00 /min            Noemi

en F Emmanuel

 

                      Respiratory Rate 2022-10-21 10:17:00 18.00 /min           

 Melvin F Emmanuel

 

                      BP Systolic 2022-10-12 11:13:00 117 mm[Hg]            Step

hen F Emmanuel

 

                      BP Diastolic 2022-10-12 11:13:00 83 mm[Hg]             Julius

phen F Emmanuel

 

                      Weight Measured 2022-10-12 11:13:00 216.00 pounds         

   Melvin F Emmanuel

 

                      Height Measured 2022-10-12 11:13:00 59.00 inches          

  Melvin F Emmanuel

 

                      Body Temperature 2022-10-12 11:13:00 97.40 degrees        

    Melvin F Emmanuel

 

                      Heart Rate 2022-10-12 11:13:00 95.00 /min            Noemi

en F Emmanuel

 

                      Respiratory Rate 2022-10-12 11:13:00 17.00 /min           

 Melvin F Emmanuel

 

                      BP Systolic 2022 16:31:00 139 mm[Hg]            Step

hen F Emmanuel

 

                      BP Diastolic 2022 16:31:00 77 mm[Hg]             Julius

phen F Emmanuel

 

                      Weight Measured 2022 16:31:00 225.00 pounds         

   Melvin F Emmanuel

 

                      Height Measured 2022 16:31:00 65.00 inches          

  Melvin F Emmanuel

 

                      Body Temperature 2022 16:31:00 98.30 degrees        

    Melvin F Emmanuel

 

                      Heart Rate 2022 16:31:00 81.00 /min            Noemi

en F Emmanuel

 

                      Respiratory Rate 2022 16:31:00 16.00 /min           

 Melvin F Emmanuel

 

                      BP Systolic 2022 13:21:00 125 mm[Hg]            Step

hen F Emmanuel

 

                      BP Diastolic 2022 13:21:00 86 mm[Hg]             Julius

phen F Emmanuel

 

                      Weight Measured 2022 13:21:00 225.00 pounds         

   Melvin F Emmanuel

 

                      Height Measured 2022 13:21:00 65.00 inches          

  Melvin F Emmanuel

 

                      Body Temperature 2022 13:21:00 99.00 degrees        

    Melvin F Emmanuel

 

                      Heart Rate 2022 13:21:00 84.00 /min            Noemi

en F Emmanuel

 

                      Respiratory Rate 2022 13:21:00 16.00 /min           

 Melvin F Emmanuel

 

                      BP Systolic 2022 09:41:00 123 mm[Hg]            Step

hen F Emmanuel

 

                      BP Diastolic 2022 09:41:00 84 mm[Hg]             Julius

phen F Emmanuel

 

                      Weight Measured 2022 09:41:00 228.00 pounds         

   Melvin F Emmanuel

 

                      Height Measured 2022 09:41:00 65.00 inches          

  Melvin F Emmanuel

 

                      Body Temperature 2022 09:41:00 98.40 degrees        

    Melvin F Emmanuel

 

                      Heart Rate 2022 09:41:00 90.00 /min            Noemi

en F Emmanuel

 

                      Respiratory Rate 2022 09:41:00                      

 Melvin F Emmanuel

 

                      BP Systolic 2022 11:34:00 148 mm[Hg]            

 

                      BP Diastolic 2022 11:34:00 58 mm[Hg]             

 

                      Weight Measured 2022 11:34:00 231.00 pounds         

   

 

                      Height Measured 2022 11:34:00 65.00 inches          

  

 

                      Body Temperature 2022 11:34:00 97.90 degrees        

    

 

                      Heart Rate 2022 11:34:00 98.00 /min            

 

                      Respiratory Rate 2022 11:34:00 18.00 /min           

 

 

                      BP Systolic 2021 11:02:00 114 mm[Hg]            

 

                      BP Diastolic 2021 11:02:00 76 mm[Hg]             

 

                      Weight Measured 2021 11:02:00 231.00 pounds         

   

 

                      Height Measured 2021 11:02:00 65.00 inches          

  

 

                      Body Temperature 2021 11:02:00 98.10 degrees        

    

 

                      Heart Rate 2021 11:02:00 80.00 /min            

 

                      Respiratory Rate 2021 11:02:00 17.00 /min           

 

 

                      BP Systolic 2021 13:30:00 117 mm[Hg]            

 

                      BP Diastolic 2021 13:30:00 46 mm[Hg]             

 

                      Weight Measured 2021 13:30:00 230.60 pounds         

   

 

                      Height Measured 2021 13:30:00 65.00 inches          

  

 

                      Body Temperature 2021 13:30:00 98.10 degrees        

    

 

                      Heart Rate 2021 13:30:00 90.00 /min            

 

                      Respiratory Rate 2021 13:30:00 16.00 /min           

 

 

                      BP Systolic 2021 17:35:00 120 mm[Hg]            

 

                      BP Diastolic 2021 17:35:00 75 mm[Hg]             

 

                      Weight Measured 2021 17:35:00 236.40 pounds         

   

 

                      Height Measured 2021 17:35:00 65.00 inches          

  

 

                      Body Temperature 2021 17:35:00 98.10 degrees        

    

 

                      Heart Rate 2021 17:35:00 86.00 /min            

 

                      Respiratory Rate 2021 17:35:00                      

 

 

                      BP Systolic 2021-10-25 17:06:00 120 mm[Hg]            

 

                      BP Diastolic 2021-10-25 17:06:00 75 mm[Hg]             

 

                      Weight Measured 2021-10-25 17:06:00 238.20 pounds         

   

 

                      Height Measured 2021-10-25 17:06:00 65.00 inches          

  

 

                      Body Temperature 2021-10-25 17:06:00 98.20 degrees        

    

 

                      Heart Rate 2021-10-25 17:06:00 104.00 /min            

 

                      Respiratory Rate 2021-10-25 17:06:00                      

 

 

                      BP Systolic 2021 11:25:00 128 mm[Hg]            

 

                      BP Diastolic 2021 11:25:00 82 mm[Hg]             

 

                      Weight Measured 2021 11:25:00 228.40 pounds         

   

 

                      Height Measured 2021 11:25:00 65.00 inches          

  

 

                      Body Temperature 2021 11:25:00 98.00 degrees        

    

 

                      Heart Rate 2021 11:25:00 98.00 /min            

 

                      Respiratory Rate 2021 11:25:00 17.00 /min           

 







Procedures





                          Procedure    Date / Time Performed Performing Clinicia

n Source

 

                          XR HAND 3+ VW LEFT 2024 15:30:25 Shannon Camacho [facility]

 

                                                    POCT SARS-COV-2 

ANTIGEN (BINAX NOW) 2023 16:00:00 Erika Minor     [facility]

 

                                                    REFERRAL- 

REQUEST/RESPONSE          2023-10-11 05:01:00       Doctor Unassigned, No 

Name                                    [facility]

 

                                                    HB CREATININE 

SERUM/BLOOD FOR 

IMAGING             2023 21:41:00 Radiology           [facility]

 

                                                    REFERRAL- 

REQUEST/RESPONSE          2023 05:01:00       Doctor Unassigned, No 

Name                                    [facility]

 

                                ASSIGNMENT OF BENEFITS 2023 17:56:32 Docto

r Unassigned, No 

Name                                    [facility]

 

                                                    RAPID STREP SCREEN FOR 

GROUP A             2023 16:47:00 Celena Harvey  [facility]

 

                                                    COVID-19 (ID NOW RAPID 

TESTING)            2023 16:47:00 Lauren Guzman [facility]

 

                                                    CONSENT/REFUSAL FOR 

DIAGNOSIS AND 

TREATMENT                 2023 16:29:53       Doctor Unassigned, No 

Name                                    [facility]

 

                                ASSIGNMENT OF BENEFITS 2023 20:10:12 Docto

r Unassigned, No 

Name                                    [facility]

 

                                                    CONSENT/REFUSAL FOR 

DIAGNOSIS AND 

TREATMENT                 2023 20:09:49       Doctor Unassigned, No 

Name                                    [facility]

 

                                XR CERVICAL SPINE 2 VW 2023 20:02:42 Lexii Mace                                   [facility]

 

                                XR FOOT 3+ VW LEFT 2023 20:02:42 Lexii Singleton                                   [facility]

 

                                XR KNEE 3 VW LEFT 2023 20:02:42 Lexii Herrera                                   [facility]

 

                                POCT PREGNANCY TEST 2023 19:56:00 Everettelisa laura Lexiiginny Carpentera                                   [facility]

 

                                                    CONSENT/REFUSAL FOR 

DIAGNOSIS AND 

TREATMENT                 2023 18:51:27       Doctor Unassigned, No 

Name                                    [facility]

 

                                ASSIGNMENT OF BENEFITS 2023 16:28:22 Docto

r Unassigned, No 

Name                                    [facility]

 

                                                    [address] Exc B9 Les Mrgn 

Xcp Sk Tg T/a/l 0.5 Cm 2021 00:00:00                     Melvin Benitez

 

                          [id number]      2021 00:00:00 XYDNI        [facility]

 

                          [id number]      2021 00:00:00 XYDNI        [facility]

[facility]

 

                          [id number]      2021 00:00:00 XYDNI        [facility]

[facility]

 

                          [id number]      2021 00:00:00 XYDNI        TARAH Hous

[place] 

[facility]







Plan of Care





                      Planned Activity Planned Date Details    Comments   Source

 

                      Goal                  Plan of Care Note [code = 11220-3]  

          

 

                      Goal                  Plan of Care Note [code = 78827-5]  

          

 

                      Goal                  Plan of Care Note [code = 15413-6]  

          

 

                      Goal                  Plan of Care Note [code = 77547-3]  

          

 

                      Goal                  Plan of Care Note [code = 13007-6]  

          

 

                      Goal                  Plan of Care Note [code = 40089-2]  

          

 

                      Goal                  Plan of Care Note [code = 23151-9]  

          

 

                      Goal                  Plan of Care Note [code = 14018-9]  

          

 

                      Goal                  Plan of Care Note [code = 54440-4]  

          

 

                      Goal                  Plan of Care Note [code = 24438-0]  

          

 

                      Goal                  Plan of Care Note [code = 00339-9]  

          

 

                      Goal                  Plan of Care Note [code = 40203-8]  

          

 

                      Goal                  Plan of Care Note [code = 64997-7]  

          

 

                      Goal                  Plan of Care Note [code = 78879-4]  

          

 

                      Goal                  Plan of Care Note [code = 54954-8]  

          

 

                      Goal                  Plan of Care Note [code = 99468-8]  

          

 

                      Goal                  Plan of Care Note [code = 41697-8]  

          

 

                      Goal                  Plan of Care Note [code = 02015-6]  

          

 

                      Goal                  Plan of Care Note [code = 39912-9]  

          

 

                      Goal                  Plan of Care Note [code = 11974-4]  

          

 

                      Goal                  Plan of Care Note [code = 04173-8]  

          

 

                      Goal                  Plan of Care Note [code = 35703-9]  

          

 

                      Goal                  Plan of Care Note [code = 05025-6]  

          

 

                      Goal                  Plan of Care Note [code = 60283-5]  

          

 

                      Goal                  Plan of Care Note [code = 96324-4]  

          

 

                      Goal                  Plan of Care Note [code = 22645-0]  

          

 

                      Goal                  Plan of Care Note [code = 76578-1]  

          

 

                      Goal                  Plan of Care Note [code = 17705-5]  

          

 

                      Goal                  Plan of Care Note [code = 80488-6]  

          

 

                      Goal                  Plan of Care Note [code = 87347-1]  

          

 

                      Goal                  Plan of Care Note [code = 55897-8]  

          

 

                      Goal                  Plan of Care Note [code = 18156-8]  

          

 

                      Goal                  Plan of Care Note [code = 39403-9]  

          

 

                      Goal                  Plan of Care Note [code = 77722-1]  

          

 

                      Goal                  Plan of Care Note [code = 81625-0]  

          

 

                      Goal                  Plan of Care Note [code = 95422-1]  

          

 

                      Goal                  Plan of Care Note [code = 50227-7]  

          

 

                      Goal                  Plan of Care Note [code = 22811-5]  

          

 

                      Goal                  Plan of Care Note [code = 07487-1]  

          

 

                      Goal                  Plan of Care Note [code = 48373-6]  

          

 

                      Goal                  Plan of Care Note [code = 44061-9]  

          

 

                      Goal                  Plan of Care Note [code = 72006-6]  

          

 

                      Goal                  Plan of Care Note [code = 43049-2]  

          

 

                      Goal                  Plan of Care Note [code = 52812-0]  

          

 

                      Goal                  Plan of Care Note [code = 95032-2]  

          

 

                      Goal                  Plan of Care Note [code = 18733-9]  

          

 

                      Goal                  Plan of Care Note [code = 67871-9]  

          

 

                      Goal                  Plan of Care Note [code = 80320-5]  

          

 

                      Goal                  Plan of Care Note [code = 45061-1]  

          

 

                      Goal                  Plan of Care Note [code = 53390-9]  

          

 

                      Goal                  Plan of Care Note [code = 87619-6]  

          

 

                      Goal                  Plan of Care Note [code = 07669-9]  

          

 

                      Goal                  Plan of Care Note [code = 71152-9]  

          

 

                      Goal                  Plan of Care Note [code = 21685-6]  

          

 

                      Goal                  Plan of Care Note [code = 24661-0]  

          

 

                      Goal                  Plan of Care Note [code = 36601-0]  

          

 

                      Goal                  Plan of Care Note [code = 92625-2]  

          

 

                      Goal                  Plan of Care Note [code = 26350-9]  

          

 

                      Goal                  Plan of Care Note [code = 21014-3]  

          

 

                      Goal                  Plan of Care Note [code = 04868-3]  

          

 

                      Goal                  Plan of Care Note [code = 50984-4]  

          

 

                      Goal                  Plan of Care Note [code = 51241-7]  

          

 

                      Goal                  Plan of Care Note [code = 72164-0]  

          

 

                      Goal                  Plan of Care Note [code = 84013-5]  

          

 

                      Goal                  Plan of Care Note [code = 38074-7]  

          

 

                      Goal                  Plan of Care Note [code = 01214-4]  

          

 

                      Goal                  Plan of Care Note [code = 39600-3]  

          

 

                      Goal                  Plan of Care Note [code = 22881-3]  

          

 

                      Goal                  Plan of Care Note [code = 09567-3]  

          

 

                      Goal                  Plan of Care Note [code = 83676-8]  

          

 

                      Goal                  Plan of Care Note [code = 20847-2]  

          

 

                      Goal                  Plan of Care Note [code = 69805-4]  

          

 

                      Goal                  Plan of Care Note [code = 21092-9]  

          







Encounters





                                                    Start 

Date/Time                               End 

Date/Time                               Encounter 

Type                                    Admission 

Type                                    Attending 

[facility]                                Care 

Department                              Encounter 

ID                                      Source

 

                                                    2021 

11:43:00                        Inpatient       EL              Josué Flores             HCANW               LD                  [id number]

87                                      [facility]

are 

[facility]

 

                                                    2021 

00:03:00                        Inpatient                       ChristopherdaJosué orellanaNW               CATHY                [id number]

66                                      [facility]

 

                                                    2021 

11:25:00                        Inpatient                       ChristopherdasJosuéNW               CATHY                [id number]

07                                      [facility]

are 

[facility]

 

                                                    2024 

08:24:00                                2024 

08:47:00  Emergency X                   [facility]      ERT       [phone] [facility]

 

                                                    2024 

08:24:00                                2024 

08:47:00        Emergency                                       [facility]                                 1.2.840.114

350.1.13.10

4.2.7.2.686

[phone]

084                       [id number]                 [facility]

 

                                                    2024 

08:55:00                                2024 

12:00:00            Emergency           X                   MARY CAMACHO         [facility]            ERT             [phone]      [facility]

 

                                                    2024 

08:55:00                                2024 

12:00:00            Emergency                               Mary Camacho                                 [facility] AT 

[facility]                                 1.2.840.114

350.1.13.10

4.2.7.2.686

[id number]

084                       [id number]                 [facility]

 

                                                    2024 

11:01:14                                2024 

11:01:14   Outpatient                       SFA        SFA        918                                    Melvin Benitez

 

                                                    2024 

00:00:00                                2024 

00:00:00                                Outpatient 

Visit                                  SFA          [id number]   [id number]

[id number]

[id number]

[id number]                                  Mel[facility]

 

                                                    2024 

10:50:20                                2024 

10:50:20   Outpatient                       SFA        SFA        829                                    Melvin Benitez

 

                                                    2024 

00:00:00                                2024 

00:00:00                                Outpatient 

Visit                                  SFA          [id number]   [id number]

[id number]

[id number]

[id number]                                  Mel[facility]

 

                                                    2024 

11:01:05                                2024 

11:01:05   Outpatient                       SFA        SFA        731                                    Melvin WOODWARD 

[facility]

 

                                                    2024 

10:54:46                                2024 

10:54:46   Outpatient                       SFA        SFA        530                                    Melvin WOODWARD 

[facility]

 

                                                    2024 

15:37:46                                2024 

15:37:46   Outpatient                       SFA        SFA        519                                    Melvin WOODWARD 

[facility]

 

                                                    2024 

08:22:59                                2024 

08:22:59   Outpatient                       SFA        SFA        514                                    Melvin WOODWARD 

[facility]

 

                                                    2024 

13:56:39                                2024 

13:56:39   Outpatient                       SFA        SFA        

0501                                    Melvin WOODWARD 

[facility]

 

                                                    2024 

00:00:00                                2024 

00:00:00                                Outpatient 

Visit                                  SFA          [id number]   [id number]

[id number]

[id number]

[id number]                                  Melvin Benitez

 

                                                    2023 

09:40:00                                2023 

10:16:31            Outpatient          R                   ERIKA MINOR           [facility]            [id number]      [facility]

 

                                                    2023 

09:40:00                                2023 

10:00:00                                Urgent 

Care                                                Erika Minor

Unknown, 

Attending                               [facility] 

MEDICAL 

OFFICE 

BUILDING                                1.2.840.114

350.1.13.10

4.2.7.2.686

[id number]

370                       [id number]                 [facility]

 

                                                    2023 

00:00:00                                2023 

00:00:00                                Letter 

(Out)                                               Erika Minor                                   [facility] 

MEDICAL 

OFFICE 

BUILDING                                1.2.840.114

350.1.13.10

4.2.7.2.686

[id number]

370                       [id number]                 [facility]

 

                                                    2023-11-15 

17:28:23                                2023-11-15 

17:28:23   Outpatient                       [facility]        

1115                                    Melvin Benitez

 

                                                    2023 

13:45:00                                2023 

13:45:00            Outpatient          R                   ROSIE JOSE CRAIG           [facility]            [id number]      [facility]

 

                                                    2023-10-25 

16:00:00                                2023-10-25 

16:00:00            Outpatient          R                   ROSIE JOSE CRAIG           [facility]            [id number]      [facility]

 

                                                    2023-10-11 

11:15:00                                2023-10-11 

11:15:00   Outpatient                       [facility]        

1011                                    Melvin Benitez

 

                                                    2023-10-11 

00:00:00                                2023-10-11 

00:00:00                                Orders 

Only                                                Doctor 

Unassigned, 

No Name                                 [facility]                                1..840.114

350.1.13.10

4.2.7.2.686

[id number]

009                       [id number]                 [facility]

 

                                                    2023 

10:41:18                                2023 

10:41:18   Outpatient                       [facility]        

0909                                    Melvin Benitez

 

                                                    2023 

10:00:00                                2023 

10:00:00            Outpatient          R                   VENKAT STOKES          [facility]            [id number]      [facility]

 

                                                    2023 

10:51:21                                2023 

10:51:21   Outpatient                       SFA        [facility]        [id number]

0831                                    Melvin Benitez

 

                                                    2023 

15:54:00                                2023 

23:59:00  Outpatient R         RADIOLOGY [facility]      [id number] [facility]

 

                                                    2023 

15:54:00                                2023 

23:59:00                                Hospital 

Encounter                               Radiology           [facility]                                  1.2.840.114

350.1.13.10

4.2.7.2.686

[phone]

801                       [id number]                 [facility]

 

                                                    2023 

00:00:00                                2023 

00:00:00                                Orders 

Only                                                Doctor 

Unassigned, 

No Name                                 [facility]                                1.2.840.114

350.1.13.10

4.2.7.2.686

[id number]

009                       [id number]                 [facility]

 

                                                    2023 

09:33:06                                2023 

09:33:06   Outpatient                       SFA        [facility]        [id number]

0812                                    Melvin Benitez

 

                                                    2023 

11:40:00                                2023 

13:05:00            Emergency           X                   LAUREN GUZMAN         [facility]            ERT             [id number]      [facility]

 

                                                    2023 

11:40:00                                2023 

13:05:00            Emergency                               Bronsonandresajay 

Ryleerosy F                               [facility]                                  1.2.840.114

350.1.13.10

4.2.7.2.686

[id number]

084                       [id number]                 [facility]

 

                                                    2023 

00:00:00                                2023 

00:00:00                                Case 

Management                                          Velvet Jolly                                   1.2.840.114

350.1.13.10

4.2.7.2.686

[id number]

086                       [id number]                 [facility]

 

                                                    2023-04-10 

08:00:00                                2023-04-10 

08:00:00            Outpatient          TERRI PAVON HOWARD          [facility]            [id number]      [facility]

 

                                                    2023 

13:30:00                                2023 

13:30:00            Outpatient          NAZARIO HOPPER           [facility]            [id number]      [facility]

 

                                                    2023 

12:30:00                                2023 

12:30:00            Outpatient          VIN MANNING         [facility]            [id number]      [facility]

 

                                                    2023 

16:12:13                                2023 

16:12:13   Outpatient                       SFA        SFA        [id number]

0316                                    Melvin WOODWARD 

[facility]

 

                                                    2023 

13:59:00                                2023 

17:02:00            Emergency           X                   VERÓNICABRENDADENI

 LEXII          [facility]            ERT             [id number]      [facility]

 

                                                    2023 

13:59:00                                2023 

17:02:00            Emergency                               Lavinia

 Lexii 

Susan                                   [facility]                                  1.84.114

350.1.13.10

4.2.7.2.686

[id number]

084                       [id number]                 [facility]

 

                                                    2023 

11:40:00                                2023 

12:00:00                                Urgent 

Care                                                Sheila Hightower

Unknown, 

Attending                               [facility]

[facility] 

MEDICAL 

OFFICE 

BUILDING                                1.84.114

350.1.13.10

4.2.7.2.686

[id number]

370                       [id number]                 [facility]

 

                                                    2023 

11:40:00                                2023 

11:40:00            Outpatient          SHEILA GROVE          [facility]            [id number]      [facility]

 

                                                    2023 

00:00:00                                2023 

00:00:00                                Orders 

Only                                                Doctor 

Unassigned, 

No Name                                 [facility]                                1.84.114

350.1.13.10

4.2.7.2.686

[id number]

009                       [id number]                 [facility]

 

                                                    2023 

15:19:17                                2023 

15:19:17   Outpatient                       SFA        [facility]        

0201                                    Melvin Benitez

 

                                                    2022-12-15 

13:24:56                                2022-12-15 

13:24:56   Outpatient                       SFA        [facility]        

1215                                    Melvin Benitez

 

                                                    2022-10-21 

10:09:46                                2022-10-21 

10:09:46   Outpatient                       SFA        [facility]        

1021                                    Melvin Benitez

 

                                                    2022-10-21 

00:00:00                                2022-10-21 

00:00:00                                Outpatient 

Visit                                                       [id number]

[id number]

[id number]

[id number]                 [id number]                [id number]

[id number]

[id number]

[id number]                                  

 

                                                    2022-10-12 

11:06:30                                2022-10-12 

11:06:30   Outpatient                       SFA        [facility]        

1012                                    Melvin Benitez

 

                                                    2022-10-12 

00:00:00                                2022-10-12 

00:00:00                                Outpatient 

Visit                                                       [id number]

[id number]

[id number]

[id number]                 [id number]                [id number]

[id number]

[id number]

[id number]                                  

 

                                                    2022-10-11 

12:00:00                                2022-10-11 

12:43:37            Outpatient          R                   JUANY BRANDT           [facility]            [id number]      [facility]

 

                                                    2022-10-11 

12:00:00                                2022-10-11 

12:20:00                                Urgent 

Care                                                Juany Brandt

Unknown, 

Attending                               [facility] 

MEDICAL 

OFFICE 

BUILDING                                1.2.840.114

350.1.13.10

4.2.7.2.686

[id number]

370                       [id number]                  [facility]

 

                                                    2022 

11:00:00                                2022 

11:20:00                                Urgent 

Care                                                Sheila Hightower, 

Wesley                                [facility] 

MEDICAL 

OFFICE 

BUILDING                                1.2.840.114

350.1.13.10

4.2.7.2.686

[id number]

370                       [id number]                  [facility]

 

                                                    2022 

11:00:00                                2022 

11:00:00            Outpatient          SAMY                   SHEILA HIGHTOWER          [facility]            [id number]      [facility]

 

                                                    2022 

00:00:00                                2022 

00:00:00            Telephone                               Provider, 

Emmanuel Herrera 

Urgent Care                             [facility] 

MEDICAL 

OFFICE 

BUILDING                                1.2.840.114

350.1.13.10

4.2.7.2.686

[id number]

370                       [id number]                  [facility]

 

                                                    2022 

17:40:00                                2022 

18:26:36            Outpatient          SAMY BRANDT 

ONELIAIA           [facility]            [id number]      [facility]

 

                                                    2022 

17:40:00                                2022 

18:26:36                                Urgent 

Care                                                Juany Brandt 

Becky                                   [facility] 

MEDICAL 

OFFICE 

BUILDING                                1.2.840.114

350.1.13.10

4.2.7.2.686

[id number]

370                       [id number]                  [facility]

 

                                                    2022 

17:40:00                                2022 

17:40:00            Outpatient          JUANY BECK           [facility]            [id number]      [facility]

 

                                                    2022 

14:45:00                                2022 

15:05:00                                Nurse 

Visit                                               Nurse, Ang 

[facility] 

Care

Unknown, 

Attending                               [facility] 

MEDICAL 

OFFICE 

BUILDING                                1.2.840.114

350.1.13.10

4.2.7.2.686

[id number]

370                       [id number]                  [facility]

 

                                                    2022 

14:45:00                                2022 

14:45:00            Outpatient          JUANY BECK           [facility]            [id number]      [facility]

 

                                                    2022 

00:00:00                                2022 

00:00:00                                Letter 

(Out)                                               CieraStefani                                 [facility]                                1.114

350.1.13.10

4.2.7.2.686

[id number]

019                       [id number]                  [facility]

 

                                                    2022 

13:40:00                                2022 

12:34:19            Outpatient          R                   CHRISTINE 

DOMINIK        [facility]            [id number]      [facility]

 

                                                    2022 

00:00:00                                2022 

00:00:00                                Orders 

Only                                                Doctor 

Unassigned, 

No Name                                 [facility]                                1..114

350.1.13.10

4.2.7.2.686

[phone]

009                       [id number]                  [facility]

 

                                                    2022 

00:00:00                                2022 

00:00:00            Telephone                               Yoshi 

[facility] 

[facility] 

[facility]                                   1.84.114

350.1.13.10

4.2.7.2.686

[id number]

198                       [id number]                  [facility]

 

                                                    2022 

15:30:00                                2022 

15:30:00            Outpatient          R                   YOSHI 

[facility]            [id number]      [facility]

 

                                                    2022 

00:00:00                                2022 

00:00:00                                Patient 

Secure Msg                                          Doctor 

Unassigned, 

No Name                                 [facility]                                1.84.114

350.1.13.10

4.2.7.2.686

[id number]

019                       [id number]                  [facility]

 

                                                    2022 

18:20:00                                2022 

18:29:53            Outpatient          R                   SHEILA HIGHTOWER          [facility]            [id number]      [facility]

 

                                                    2022 

18:20:00                                2022 

18:29:53                                Urgent 

Care                                                Sheila Hightower, 

[facility]

[facility] 

MEDICAL 

OFFICE 

BUILDING                                1.84.114

350.1.13.10

4.2.7.2.686

[id number]

370                       [id number]                  [facility]

 

                                                    2022 

18:20:00                                2022 

18:20:00            Outpatient          WESLEY LUTZ        [facility]            [id number]      [facility]

 

                                                    2022 

18:20:00                                2022 

18:20:00            Outpatient          SHEILA GROVE          [facility]            [id number]      [facility]

 

                                                    2017-10-23 

00:00:00                                2017-10-23 

00:00:00  Outpatient                     [facility]       [id number] [facility]

 

                                                    2017-10-12 

10:25:55                                2017-10-12 

10:25:55  Outpatient                     [facility]       [id number] [facility]

 

                                                    2017 

10:39:05                                2017 

10:39:05  Outpatient                     [facility]       [id number] [facility]

 

                                                    2017 

09:21:03                                2017 

09:21:03  Outpatient                     [facility]       [id number]  [facility]







Results





                      Test Description Test Time  Test Comments Results    Resul

t 

Comments                                Source

 

                                                    XR HAND 3+ VW 

LEFT                                    2024

9 

15:54:22                                            HISTORY: Left hand 

swelling. FINDINGS: 

AP, lateral, 

oblique views of 

left hand showed no 

acute fractureor 

dislocation. No 

significant changes 

of arthritis or 

aggressive 

bonelesions seen. 

No soft tissue 

calcifications or 

bony erosions 

orjuxta-articular 

osteoporosis 

detected. 

CONCLUSIONS: Normal 

study.                                              [facility]







                                        

 

                                        

 

                                        





Melvin WOODWARD AustinHERPES SIMPLEX 1/2 ANTIBODY, [id number] 00:00:00* 



                      Test Item  Value      Reference Range Interpretation Comme

nts

 

                                                    HERPES SIMPLEX 1 AB, IgG (te

st 

code = 76481)   0.021 INDEX                                     

 

                                                    HERPES SIMPLEX 2 AB, IgG (te

st 

code = 72744)   0.081 INDEX                                     





Melvin WOODWARD AustinVAGINAL PATHOGENS DNA [id number] 11:24:34* 



                      Test Item  Value      Reference Range Interpretation Comme

nts

 

                                                    TETO SPECIES 

(test code = 

17497)          NEGATIVE        NEGATIVE                        

 

                                                    G. VAGINALIS 

(test code = 

)          NEGATIVE        NEGATIVE                        

 

                                                    T. VAGINALIS 

(test code = 

)          NEGATIVE        NEGATIVE                        Note: [facility]

irm VPIII 

Microbial Identification 

Testis a DNA probe test 

intended for use in the 

detectionand identification 

of Candida species, 

Gardnerellavaginalis and 

Trichomonas vaginalis 

nucleic acid. UNLESS 

OTHERWISE INDICATED, ALL 

TESTING PERFORMED AT 

[facility] [address] [place] [address] 

: 

PACHECO SUN M.D. CLIA 

NUMBER [id number] [place] 

[place]. [id number]





VAGINAL PATHOGENS DNA [id number] 00:00:00* 



                      Test Item  Value      Reference Range Interpretation Comme

nts

 

                      TETO SPECIES (test code = 01769) NEGATIVE              

           

 

                      G. VAGINALIS (test code = 65622) NEGATIVE                 

        

 

                      T. VAGINALIS (test code = 30991) NEGATIVE                 

        





Melvin F AustinVAGINAL PATHOGENS DNA [id number] 00:00:00* 



                      Test Item  Value      Reference Range Interpretation Comme

nts

 

                      TETO SPECIES (test code = 53980) NEGATIVE              

           

 

                      G. VAGINALIS (test code = 63310) NEGATIVE                 

        

 

                      T. VAGINALIS (test code = 92709) NEGATIVE                 

        





Melvin WOODWARD AustinPOCT SARS-COV-2 ANTIGEN (BINAX NOW)2023 16:00:00* 



                      Test Item  Value      Reference Range Interpretation Comme

nts

 

                                                    POCT SARS-COV-2 ANTIGEN (jet

t code = 

72969-4)        Positive        Not Detected    A               

 

                                                    On board controls acceptable

 with C 

Line (test code = 3574) Yes                                             

 

                                                    Lab Interpretation (test cod

e = 

55502-4)        Abnormal                                        





[facility], [id number] 10:13:03SPECIMEN 
NUMBER: [id number] CULTURE, URINE SPECIMEN NUMBER: [id number] SPECIMEN COMMENT: 
URINE SOURCE: URINE REPORT STATUS: FINAL ISOLATE NUMBER 1: IDENTIFICATION: 
2023 ,000 CFU/ML STREPTOCOCCUS AGALACTIAE (GROUP B) ADDITIONAL 
OBSERVATIONS: PENICILLIN AND AMPICILLIN ARE DRUGS OF CHOICE FOR  TREATMENT OF B-
HEMOLYTIC STREPTOCOCCAL INFECTIONS. SUSCEPTIBILITY TESTING OF PENICILLIN AND 
OTHERB-LACTAMS APPROVED BY THE [place] [employer]OD AND DRUG  ADMINISTRATION FOR TREATMENT OF
B-HEMOLYTIC STREPTOCOCCAL INFECTIONS NEED NOT BE PERFORMED ROUTINELY. ADDITIONAL
OBSERVATIONS: 2023 ,000 CFU/MLMIXED MICROBIAL POPULATION PRESENT, NO
PREDOMINATING ORGANISMS;PROBABLE CONTAMINANTS. UNLESS OTHERWISE INDICATED, ALL 
TESTING PERFORMED AT [facility] [address] [place] [address] : PACHECO SUN M.D. CLIA NUMBER [id number] [place]. [id number], [id number] 00:00:00* 



                      Test Item  Value      Reference Range Interpretation Comme

nts

 

                                                    CULTURE, URINE (test 

code = 97187)                           SPECIMEN NUMBER: 

[id number]                                                   





Melvin BenitezCULTURE, [id number] 00:00:00* 



                      Test Item  Value      Reference Range Interpretation Comme

nts

 

                                                    CULTURE, URINE (test 

code = 94416)                           SPECIMEN NUMBER: 

[id number]                                                   





Melvin AdkinsLTURE, [id number] 00:00:00* 



                      Test Item  Value      Reference Range Interpretation Comme

nts

 

                                                    CULTURE, URINE (test 

code = 17072)                           SPECIMEN NUMBER: 

[id number]                                                   





Melvin BenitezPOCT [id number] 22:34:34* 



                      Test Item  Value      Reference Range Interpretation Comme

nts

 

                                                    POCT Creatinine (test code =

 

7118633451)     0.7 mg/dL       0.5-1.1                         

 

                                                    Lab Interpretation (test cod

e = 

02438-2)        Normal                                          





[facility] System[facility]IT[facility], [facility] [id number] 04:35:20* 



                      Test Item  Value      Reference Range Interpretation Comme

nts

 

                                                    VITAMIN D, 25 

OH (test code 

= 4958)         22 NG/ML        SEE BELOW       L               ***EFFECTIVE 2023, PLEASE 

NOTE NEW METHODOLOGY IS*** 

ELECTROCHEMILUMINESCENCE 

BINDING ASSAY. NOTE: 

25-HYDROXYVITAMIN D ASSAY 

INCLUDES 25-HYDROXYVITAMIN D2 

AND D3. ***** INTERPRETIVE 

RANGES *****PEDIATRIC (<17 

YEARS) . . . . . . . . . . . 

NG/ML 20-100ADULT: INSUFFICIENT 

. . . . . . . . . . . . . . 

NG/ML  <20 SUBOPTIMAL . . . . . 

. . . . . . . . . . NG/ML 20-29 

OPTIMAL . . . . . . . . . . . . 

. . . . . NG/ML  UNLESS 

OTHERWISE INDICATED, ALL 

TESTING PERFORMED AT [facility] 

[address] [place] [address] 

: PACHECO SUN M.D. CLIA NUMBER 

[id number] [place] 

[place]. [id number]





TSH, THIRD [id number] 03:40:32* 



                      Test Item  Value      Reference Range Interpretation Comme

nts

 

                                                    TSH, THIRD GENERATION (test 

code 

= 2821)         1.890 UIU/ML    0.400-4.100                     





COMPREHENSIVE METABOLIC [id number] 02:56:17* 



                      Test Item  Value      Reference Range Interpretation Comme

nts

 

                                                    GLUCOSE (test code = 

)           95 MG/DL        70-99                           

 

                                                    BUN (test code = 

)           7 MG/DL         6-20                            

 

                                                    CREATININE (test 

code = )    0.85 MG/DL      0.60-1.30                       

 

                                                    eGFR ( CKD-EPI) 

(test code = 11785) 93 ML/MIN/1.73  >60                             

 

                                                    CALC BUN/CREAT (test 

code = 223)    8 RATIO         6-28                            

 

                                                    SODIUM (test code = 

)           142 MEQ/L       133-146                         

 

                                                    POTASSIUM (test code 

= )         4.6 MEQ/L       3.5-5.4                         

 

                                                    CHLORIDE (test code 

= )         106 MEQ/L                                 

 

                                                    CARBON DIOXIDE (test 

code = )    24 MEQ/L        19-31                           

 

                                                    CALCIUM (test code = 

)           9.2 MG/DL       8.5-10.5                        

 

                                                    PROTEIN, TOTAL (test 

code = )    6.3 G/DL        6.1-8.3                         

 

                                                    ALBUMIN (test code = 

)           4.5 G/DL        3.5-5.2                         

 

                                                    CALC GLOBULIN (test 

code = 224)    1.8 G/DL        1.9-3.7         L               

 

                                                    CALC A/G RATIO (test 

code = )    2.5 RATIO       1.0-2.6                         

 

                                                    BILIRUBIN, TOTAL 

(test code = ) 0.3 MG/DL       See_Comment                     [Automated me

ssage] 

The system which 

generated this 

result transmitted 

reference range: 

<=1.2. The reference 

range was not used 

to interpret this 

result as 

normal/abnormal.

 

                                                    ALKALINE PHOSPHATASE 

(test code = ) 85 U/L                                    

 

                                                    AST (test code = 

)           17 U/L          9-40                            

 

                                                    ALT (test code = 

2219)           20 U/L          5-40                            





LIPID [id number] 02:56:17* 



                      Test Item  Value      Reference Range Interpretation Comme

nts

 

                                                    CHOLESTEROL (test 

code = 2210)    150 MG/DL       <200                            

 

                                                    TRIGLYCERIDES (test 

code = 2232)    125 MG/DL       <150                            

 

                                                    HDL CHOLESTEROL (test 

code = 2220)    40 MG/DL        >39                             

 

                                                    CALC LDL CHOL (test 

code = 223)    87 MG/DL        <100                            NOTE: CALCULATED

 LDL 

IS BASED ON 

TIMMY-RAMOS METHOD 

WHICHINCLUDES 

ADJUSTABLE 

TRIGLYCERIDE:VLDL 

CHOLESTEROL RATIO.THIS 

FACTOR VARIES BY 

MEASURED TRIGLYCERIDE 

AND NON-HDLCHOLESTEROL 

CONCENTRATIONS WITH 

INCREASED CALCULATED 

LDL SEENIN HIGHER 

TRIGLYCERIDE OR LOWER 

NON-HDL SPECIMENS. FOR 

MOREINFORMATION, SEE 

CLIENT ANNOUNCEMENT AT 

[email or web address]

[email or web address]

 

                                                    RISK RATIO LDL/HDL 

(test code = 2238) 2.18 RATIO      <3.22                           





COMPREHENSIVE METABOLIC [id number] 00:00:00* 



                      Test Item  Value      Reference Range Interpretation Comme

nts

 

                      GLUCOSE (test code = 2217) 95 MG/DL                       

  

 

                      BUN (test code = 2208) 7 MG/DL                          

 

                      CREATININE (test code = 2214) 0.85 MG/DL                  

     

 

                                                    eGFR ( CKD-EPI) (test co

de 

= 72021)        93 ML/MIN/1.73                                  

 

                                                    CALC BUN/CREAT (test code = 

2235)           8 RATIO                                         

 

                      SODIUM (test code = 2231) 142 MEQ/L                       

 

 

                      POTASSIUM (test code = 2228) 4.6 MEQ/L                    

    

 

                      CHLORIDE (test code = 2215) 106 MEQ/L                     

   

 

                                                    CARBON DIOXIDE (test code = 

2206)           24 MEQ/L                                        

 

                      CALCIUM (test code = 2209) 9.2 MG/DL                      

  

 

                                                    PROTEIN, TOTAL (test code = 

2229)           6.3 G/DL                                        

 

                      ALBUMIN (test code = 2201) 4.5 G/DL                       

  

 

                                                    CALC GLOBULIN (test code = 

2240)           1.8 G/DL                                        

 

                                                    CALC A/G RATIO (test code = 

2234)           2.5 RATIO                                       

 

                                                    BILIRUBIN, TOTAL (test code 

= 

2207)           0.3 MG/DL                                       

 

                                                    ALKALINE PHOSPHATASE (test 

code = 2204)    85 U/L                                          

 

                      AST (test code = 2218) 17 U/L                           

 

                      ALT (test code = 2219) 20 U/L                           





Melvin BenitezLIPID [id number] 00:00:00* 



                      Test Item  Value      Reference Range Interpretation Comme

nts

 

                      CHOLESTEROL (test code = 2210) 150 MG/DL                  

      

 

                      TRIGLYCERIDES (test code = 2232) 125 MG/DL                

        

 

                      HDL CHOLESTEROL (test code = 2220) 40 MG/DL               

          

 

                      CALC LDL CHOL (test code = 2237) 87 MG/DL                 

        

 

                                                    RISK RATIO LDL/HDL (test cod

e = 

2238)           2.18 RATIO                                      





Melvin WOODWARD EmmanuelTSH, THIRD [id number] 00:00:00* 



                      Test Item  Value      Reference Range Interpretation Comme

nts

 

                                                    TSH, THIRD GENERATION (test 

code 

= 2821)         1.890 UIU/ML                                    





Melvin BenitezVITAMIN D, 25 [id number] 00:00:00* 



                      Test Item  Value      Reference Range Interpretation Comme

nts

 

                      VITAMIN D, 25 OH (test code = 4958) 22 NG/ML              

           





Melvin BenitezCOMPREHENSIVE METABOLIC [id number] 00:00:00* 



                      Test Item  Value      Reference Range Interpretation Comme

nts

 

                      GLUCOSE (test code = 2217) 95 MG/DL                       

  

 

                      BUN (test code = 2208) 7 MG/DL                          

 

                      CREATININE (test code = 2214) 0.85 MG/DL                  

     

 

                                                    eGFR ( CKD-EPI) (test co

de 

= 85279)        93 ML/MIN/1.73                                  

 

                                                    CALC BUN/CREAT (test code = 

2235)           8 RATIO                                         

 

                      SODIUM (test code = 2231) 142 MEQ/L                       

 

 

                      POTASSIUM (test code = 2228) 4.6 MEQ/L                    

    

 

                      CHLORIDE (test code = 2215) 106 MEQ/L                     

   

 

                                                    CARBON DIOXIDE (test code = 

2206)           24 MEQ/L                                        

 

                      CALCIUM (test code = 2209) 9.2 MG/DL                      

  

 

                                                    PROTEIN, TOTAL (test code = 

2229)           6.3 G/DL                                        

 

                      ALBUMIN (test code = 2201) 4.5 G/DL                       

  

 

                                                    CALC GLOBULIN (test code = 

2240)           1.8 G/DL                                        

 

                                                    CALC A/G RATIO (test code = 

2234)           2.5 RATIO                                       

 

                                                    BILIRUBIN, TOTAL (test code 

= 

2207)           0.3 MG/DL                                       

 

                                                    ALKALINE PHOSPHATASE (test 

code = 2204)    85 U/L                                          

 

                      AST (test code = 2218) 17 U/L                           

 

                      ALT (test code = 2219) 20 U/L                           





Melvin BenitezLIPID [id number] 00:00:00* 



                      Test Item  Value      Reference Range Interpretation Comme

nts

 

                      CHOLESTEROL (test code = 2210) 150 MG/DL                  

      

 

                      TRIGLYCERIDES (test code = 2232) 125 MG/DL                

        

 

                      HDL CHOLESTEROL (test code = 2220) 40 MG/DL               

          

 

                      CALC LDL CHOL (test code = 2237) 87 MG/DL                 

        

 

                                                    RISK RATIO LDL/HDL (test cod

e = 

2238)           2.18 RATIO                                      





Melvin BenitezTSH, THIRD [id number] 00:00:00* 



                      Test Item  Value      Reference Range Interpretation Comme

nts

 

                                                    TSH, THIRD GENERATION (test 

code 

= 2821)         1.890 UIU/ML                                    





Melvin BenitezVITAMIN D, 25 [id number] 00:00:00* 



                      Test Item  Value      Reference Range Interpretation Comme

nts

 

                      VITAMIN D, 25 OH (test code = 4958) 22 NG/ML              

           





Melvin BenitezCOMPREHENSIVE METABOLIC [id number] 00:00:00* 



                      Test Item  Value      Reference Range Interpretation Comme

nts

 

                      GLUCOSE (test code = 2217) 95 MG/DL                       

  

 

                      BUN (test code = 2208) 7 MG/DL                          

 

                      CREATININE (test code = 2214) 0.85 MG/DL                  

     

 

                                                    eGFR ( CKD-EPI) (test co

de 

= 12255)        93 ML/MIN/1.73                                  

 

                                                    CALC BUN/CREAT (test code = 

2235)           8 RATIO                                         

 

                      SODIUM (test code = 2231) 142 MEQ/L                       

 

 

                      POTASSIUM (test code = 2228) 4.6 MEQ/L                    

    

 

                      CHLORIDE (test code = 2215) 106 MEQ/L                     

   

 

                                                    CARBON DIOXIDE (test code = 

2206)           24 MEQ/L                                        

 

                      CALCIUM (test code = 2209) 9.2 MG/DL                      

  

 

                                                    PROTEIN, TOTAL (test code = 

2229)           6.3 G/DL                                        

 

                      ALBUMIN (test code = 2201) 4.5 G/DL                       

  

 

                                                    CALC GLOBULIN (test code = 

2240)           1.8 G/DL                                        

 

                                                    CALC A/G RATIO (test code = 

2234)           2.5 RATIO                                       

 

                                                    BILIRUBIN, TOTAL (test code 

= 

2207)           0.3 MG/DL                                       

 

                                                    ALKALINE PHOSPHATASE (test 

code = 2204)    85 U/L                                          

 

                      AST (test code = 2218) 17 U/L                           

 

                      ALT (test code = 2219) 20 U/L                           





Melvin BenitezLIPID [id number] 00:00:00* 



                      Test Item  Value      Reference Range Interpretation Comme

nts

 

                      CHOLESTEROL (test code = 2210) 150 MG/DL                  

      

 

                      TRIGLYCERIDES (test code = 2232) 125 MG/DL                

        

 

                      HDL CHOLESTEROL (test code = 2220) 40 MG/DL               

          

 

                      CALC LDL CHOL (test code = 2237) 87 MG/DL                 

        

 

                                                    RISK RATIO LDL/HDL (test cod

e = 

2238)           2.18 RATIO                                      





Melvin BenitezTSH, THIRD [id number] 00:00:00* 



                      Test Item  Value      Reference Range Interpretation Comme

[facility]

 

                                                    TSH, THIRD GENERATION (test 

code 

= 2821)         1.890 UIU/ML                                    





Melvin BenitezVITAMIN D, 25 [id number] 00:00:00* 



                      Test Item  Value      Reference Range Interpretation Comme

[facility]

 

                      VITAMIN D, 25 OH (test code = 4958) 22 NG/ML              

           





Melvin BenitezCBC W/AUTO DIFF WITH [id number] 02:26:21* 



                      Test Item  Value      Reference Range Interpretation Comme

nts

 

                                                    WBC (test code = 

1001)           8.3 K/UL        3.5-11.0                        

 

                                                    RBC (test code = 

1002)           4.31 M/UL       3.80-5.40                       

 

                                                    HEMOGLOBIN (test code 

= 1003)         14.3 G/DL       11.5-15.5                       

 

                                                    HEMATOCRIT (test code 

= 1004)         41.4 %          34.0-45.0                       

 

                                                    MCV (test code = 

1005)           96.1 fL         80.0-99.0                       

 

                                                    MCH (test code = 

1006)           33.2 PG         25.0-33.0       H               

 

                                                    MCHC (test code = 

1007)           34.5 G/DL       31.0-36.0                       

 

                                                    RDW (test code = 

1038)           12.4 %          11.5-15.0                       

 

                                                    NEUTROPHILS (test 

code = 1008)    61.4 %                                          

 

                                                    LYMPHOCYTES (test 

code = 1010)    27.4 %                                          

 

                                                    MONOCYTES (test code 

= 1011)         8.2 %                                           

 

                                                    EOSINOPHILS (test 

code = 1012)    2.7 %                                           

 

                                                    BASOPHILS (test code 

= 1013)         0.1 %                                           

 

                                                    IMMATURE GRANULOCYTES 

(test code = 1036) 0.2 %                                           

 

                                                    NUCLEATED RBCS (test 

code = 1065)    0.0 /100 WBC'S  See_Comment                     [Automated messa

ge] 

The system which 

generated this 

result transmitted 

reference range: 

0.0. The reference 

range was not used 

to interpret this 

result as 

normal/abnormal.

 

                                                    PLATELET COUNT (test 

code = 1015)    279 K/UL        130-400                         

 

                                                    ABSOLUTE NEUTROPHILS 

(test code = 1066) 5.07 K/UL       1.50-7.50                       

 

                                                    ABSOLUTE LYMPHOCYTES 

(test code = 1067) 2.26 K/UL       1.00-4.00                       

 

                                                    ABSOLUTE MONOCYTES 

(test code = 1068) 0.68 K/UL       0.20-1.00                       

 

                                                    ABSOLUTE EOSINOPHILS 

(test code = 1040) 0.22 K/UL       0.00-0.50                       

 

                                                    ABSOLUTE BASOPHILS 

(test code = 1069) 0.01 K/UL       0.00-0.20                       

 

                                                    ABS IMMATURE 

GRANULOCYTES (test 

code = 1020)    0.02 K/UL       0.00-0.10                       

 

                                                    ABS NUCLEATED RBCS 

(test code = 81751) 0.00 K/UL       0.00-0.11                       





CBC W/AUTO [id number] 00:00:00* 



                      Test Item  Value      Reference Range Interpretation Comme

nts

 

                      WBC (test code = 1001) 8.3 K/UL                         

 

                      RBC (test code = 1002) 4.31 M/UL                        

 

                      HEMOGLOBIN (test code = 1003) 14.3 G/DL                   

     

 

                      HEMATOCRIT (test code = 1004) 41.4 %                      

     

 

                      MCV (test code = 1005) 96.1 fL                          

 

                      MCH (test code = 1006) 33.2 PG                          

 

                      MCHC (test code = 1007) 34.5 G/DL                        

 

                      RDW (test code = 1038) 12.4 %                           

 

                      NEUTROPHILS (test code = 1008) 61.4 %                     

      

 

                      LYMPHOCYTES (test code = 1010) 27.4 %                     

      

 

                      MONOCYTES (test code = 1011) 8.2 %                        

    

 

                      EOSINOPHILS (test code = 1012) 2.7 %                      

      

 

                      BASOPHILS (test code = 1013) 0.1 %                        

    

 

                                                    IMMATURE GRANULOCYTES (test 

code = 1036)    0.2 %                                           

 

                                                    NUCLEATED RBCS (test code = 

1065)           0.0 /100WBC'S                                   

 

                                                    PLATELET COUNT (test code = 

1015)           279 K/UL                                        

 

                                                    ABSOLUTE NEUTROPHILS (test c

ode 

= 1066)         5.07 K/UL                                       

 

                                                    ABSOLUTE LYMPHOCYTES (test c

ode 

= 1067)         2.26 K/UL                                       

 

                                                    ABSOLUTE MONOCYTES (test cod

e = 

1068)           0.68 K/UL                                       

 

                                                    ABSOLUTE EOSINOPHILS (test c

ode 

= 1040)         0.22 K/UL                                       

 

                                                    ABSOLUTE BASOPHILS (test cod

e = 

1069)           0.01 K/UL                                       

 

                                                    ABS IMMATURE GRANULOCYTES (t

est 

code = 1020)    0.02 K/UL                                       

 

                                                    ABS NUCLEATED RBCS (test cod

e = 

96764)          0.00 K/UL                                       





Melvin WOODWARD [facility] W/AUTO [id number] 00:00:00* 



                      Test Item  Value      Reference Range Interpretation Comme

nts

 

                      WBC (test code = 1001) 8.3 K/UL                         

 

                      RBC (test code = 1002) 4.31 M/UL                        

 

                      HEMOGLOBIN (test code = 1003) 14.3 G/DL                   

     

 

                      HEMATOCRIT (test code = 1004) 41.4 %                      

     

 

                      MCV (test code = 1005) 96.1 fL                          

 

                      MCH (test code = 1006) 33.2 PG                          

 

                      MCHC (test code = 1007) 34.5 G/DL                        

 

                      RDW (test code = 1038) 12.4 %                           

 

                      NEUTROPHILS (test code = 1008) 61.4 %                     

      

 

                      LYMPHOCYTES (test code = 1010) 27.4 %                     

      

 

                      MONOCYTES (test code = 1011) 8.2 %                        

    

 

                      EOSINOPHILS (test code = 1012) 2.7 %                      

      

 

                      BASOPHILS (test code = 1013) 0.1 %                        

    

 

                                                    IMMATURE GRANULOCYTES (test 

code = 1036)    0.2 %                                           

 

                                                    NUCLEATED RBCS (test code = 

1065)           0.0 /100WBC'S                                   

 

                                                    PLATELET COUNT (test code = 

1015)           279 K/UL                                        

 

                                                    ABSOLUTE NEUTROPHILS (test c

ode 

= 1066)         5.07 K/UL                                       

 

                                                    ABSOLUTE LYMPHOCYTES (test c

ode 

= 1067)         2.26 K/UL                                       

 

                                                    ABSOLUTE MONOCYTES (test cod

e = 

1068)           0.68 K/UL                                       

 

                                                    ABSOLUTE EOSINOPHILS (test c

ode 

= 1040)         0.22 K/UL                                       

 

                                                    ABSOLUTE BASOPHILS (test cod

e = 

1069)           0.01 K/UL                                       

 

                                                    ABS IMMATURE GRANULOCYTES (t

est 

code = 1020)    0.02 K/UL                                       

 

                                                    ABS NUCLEATED RBCS (test cod

e = 

77099)          0.00 K/UL                                       





Melvin WOODWARD [facility] W/AUTO [id number] 00:00:00* 



                      Test Item  Value      Reference Range Interpretation Comme

nts

 

                      WBC (test code = 1001) 8.3 K/UL                         

 

                      RBC (test code = 1002) 4.31 M/UL                        

 

                      HEMOGLOBIN (test code = 1003) 14.3 G/DL                   

     

 

                      HEMATOCRIT (test code = 1004) 41.4 %                      

     

 

                      MCV (test code = 1005) 96.1 fL                          

 

                      MCH (test code = 1006) 33.2 PG                          

 

                      MCHC (test code = 1007) 34.5 G/DL                        

 

                      RDW (test code = 1038) 12.4 %                           

 

                      NEUTROPHILS (test code = 1008) 61.4 %                     

      

 

                      LYMPHOCYTES (test code = 1010) 27.4 %                     

      

 

                      MONOCYTES (test code = 1011) 8.2 %                        

    

 

                      EOSINOPHILS (test code = 1012) 2.7 %                      

      

 

                      BASOPHILS (test code = 1013) 0.1 %                        

    

 

                                                    IMMATURE GRANULOCYTES (test 

code = 1036)    0.2 %                                           

 

                                                    NUCLEATED RBCS (test code = 

1065)           0.0 /100WBC'S                                   

 

                                                    PLATELET COUNT (test code = 

1015)           279 K/UL                                        

 

                                                    ABSOLUTE NEUTROPHILS (test c

ode 

= 1066)         5.07 K/UL                                       

 

                                                    ABSOLUTE LYMPHOCYTES (test c

ode 

= 1067)         2.26 K/UL                                       

 

                                                    ABSOLUTE MONOCYTES (test cod

e = 

1068)           0.68 K/UL                                       

 

                                                    ABSOLUTE EOSINOPHILS (test c

ode 

= 1040)         0.22 K/UL                                       

 

                                                    ABSOLUTE BASOPHILS (test cod

e = 

1069)           0.01 K/UL                                       

 

                                                    ABS IMMATURE GRANULOCYTES (t

est 

code = 1020)    0.02 K/UL                                       

 

                                                    ABS NUCLEATED RBCS (test cod

e = 

09879)          0.00 K/UL                                       





Melvin WOODWARD AustinARTHRITIS [id number] 00:26:57* 



                      Test Item  Value      Reference Range Interpretation Comme

nts

 

                                                    RHEUMATOID FACTOR, 

QUANT (test code = 

3502)           <10 IU/ML       <14                             

 

                                                    URIC ACID (test 

code = 2233)    3.5 MG/DL       2.7-6.1                         

 

                                                    SEDIMENTATION RATE 

(test code = 1017)                      TEST NOT 

PERFORMED 

MM/HOUR             0-20                                    Unable to perform 

testing, specimen not 

received.Charges 

adjusted as 

applicable.

 

                                                    ANTI-NUCLEAR 

ANTIBODIES (test 

code = 3506)    NEGATIVE        NEGATIVE                        

 

                                                    SUSU PATTERN 

(REPORTED AS TITER) 

(test code = 70677) SEE BELOW                                       

 

                                                    HOMOGENEOUS (test 

code = 96003)   NEGATIVE TITER  NEGATIVE                        

 

                                                    SPECKLED (test code 

= 534268)       NEGATIVE TITER  NEGATIVE                        

 

                                                    DENSE FINE SPECKLED 

(test code = 14089) NEGATIVE TITER  NEGATIVE                        

 

                                                    CENTROMERE (test 

code = 906751)  NEGATIVE TITER  NEGATIVE                        

 

                                                    COARSE SPECKLED 

(test code = 

713743)         NEGATIVE TITER  NEGATIVE                        

 

                                                    DISCRETE NUCLEAR 

DOTS (test code = 

798709)         NEGATIVE TITER  NEGATIVE                        

 

                                                    NUCLEOLAR (test 

code = 290113)  NEGATIVE TITER  NEGATIVE                        

 

                                                    NUCLEAR MEMBRANE 

(test code = 

555209)         NEGATIVE TITER  NEGATIVE                        

 

                                                    CYTO. RETICULAR 

(PASCUAL) (test code = 

935346)         NEGATIVE        NEGATIVE                        

 

                                                    COMMENTS (test code 

= 360978)       NONE                                            

 

                                                    METHOD (test code = 

05759)          (NOTE)                                          NOTE: EFFECTIVE 

2022, METHOD IS 

TRANSITIONED TO THE 

[employer] IFA PLATFORM. 

THE METHOD INCLUDES A 

SCREENTHRESHOLD OF 

1:80, DIGITIZED AND 

COMPUTER 

ALGORITHM-ASSISTEDINT

ERPRETATION OF TITERS 

AND DIGITAL PATTERNS, 

AND HEp-2 CELLLINE 

SUBSTRATE. ADDITIONAL 

UNUSUAL PATTERNS WILL 

BE GIVEN ASCOMMENTS. 

FOR MORE INFORMATION, 

SEE 

[email or web address]

esting





VITAMIN D,1,89-[id number] 16:11:55* 



                      Test Item  Value      Reference Range Interpretation Comme

nts

 

                                                    VITAMIN 

D,1,25-DIHYDROXY 

(test code = 4960) 52.0 PG/ML      20.0-82.0                       This assay is

 primarily 

indicated for evaluation 

of hypercalcemia and for 

assessing patients with 

renal failure. A normal 

result does not exclude 

Vitamin D deficiency. To 

assess for Vitamin D 

deficiency, consider 

25-Hydroxy Vitamin D 

assay. *** [facility] has 

important pathology 

staff changes effective 

2023. *** New 

pathology staff will 

provide uninterrupted, 

excellent patient care  

and clinical 

consultation. See URL: 

[email or web address]

[email or web address]. UNLESS OTHERWISE 

INDICATED, ALL TESTING 

PERFORMED AT [facility] [address] [place] [id number] 

: 

PACHECO SUN M.D. 

CLIA NUMBER [id number] 

CAP ACCREDITATION NO. 

[id number]





VITAMIN D,1,89-[id number] 00:00:00* 



                      Test Item  Value      Reference Range Interpretation Comme

nts

 

                                                    VITAMIN D,1,25-DIHYDROXY (te

st 

code = 4960)    52.0 PG/ML                                      





Melvin BenitezVITAMIN D,1,77-[id number] 00:00:00* 



                      Test Item  Value      Reference Range Interpretation Comme

[facility]

 

                                                    VITAMIN D,1,25-DIHYDROXY (te

st 

code = 4960)    52.0 PG/ML                                      





Melvin BenitezVITAMIN D,1,97-[id number] 00:00:00* 



                      Test Item  Value      Reference Range Interpretation Comme

[facility]

 

                                                    VITAMIN D,1,25-DIHYDROXY (te

st 

code = 4960)    52.0 PG/ML                                      





Melvin BenitezARTHRITIS [id number] 00:00:00* 



                      Test Item  Value      Reference Range Interpretation Comme

nts

 

                                                    RHEUMATOID FACTOR, QUANT 

(test code = 3502) <10 IU/ML                                       

 

                                                    URIC ACID (test code = 

2233)           3.5 MG/DL                                       

 

                                                    SEDIMENTATION RATE (test 

code = 1017)                            TEST NOT PERFORMED 

MM/HOUR                                                     

 

                                                    ANTI-NUCLEAR ANTIBODIES 

(test code = 3506) NEGATIVE                                        

 

                                                    SUSU PATTERN (REPORTED AS 

TITER) (test code = 

95809)          SEE BELOW                                       

 

                                                    HOMOGENEOUS (test code = 

08658)          NEGATIVE TITER                                  

 

                                                    SPECKLED (test code = 

819234)         NEGATIVE TITER                                  

 

                                                    DENSE FINE SPECKLED (test 

code = 56710)   NEGATIVE TITER                                  

 

                                                    CENTROMERE (test code = 

512865)         NEGATIVE TITER                                  

 

                                                    COARSE SPECKLED (test 

code = 363473)  NEGATIVE TITER                                  

 

                                                    DISCRETE NUCLEAR DOTS 

(test code = 839641) NEGATIVE TITER                                  

 

                                                    NUCLEOLAR (test code = 

109906)         NEGATIVE TITER                                  

 

                                                    NUCLEAR MEMBRANE (test 

code = 136529)  NEGATIVE TITER                                  

 

                                                    CYTO. RETICULAR (PASCUAL) 

(test code = 686525) NEGATIVE                                        

 

                                                    COMMENTS (test code = 

078108)         NONE                                            

 

                                                    METHOD (test code = 

22267)          (NOTE)                                          





Melvin BenitezARTHRITIS [id number] 00:00:00* 



                      Test Item  Value      Reference Range Interpretation Comme

nts

 

                                                    RHEUMATOID FACTOR, QUANT 

(test code = 3502) <10 IU/ML                                       

 

                                                    URIC ACID (test code = 

2233)           3.5 MG/DL                                       

 

                                                    SEDIMENTATION RATE (test 

code = 1017)                            TEST NOT PERFORMED 

MM/HOUR                                                     

 

                                                    ANTI-NUCLEAR ANTIBODIES 

(test code = 3506) NEGATIVE                                        

 

                                                    SUSU PATTERN (REPORTED AS 

TITER) (test code = 

59276)          SEE BELOW                                       

 

                                                    HOMOGENEOUS (test code = 

84583)          NEGATIVE TITER                                  

 

                                                    SPECKLED (test code = 

740040)         NEGATIVE TITER                                  

 

                                                    DENSE FINE SPECKLED (test 

code = 03939)   NEGATIVE TITER                                  

 

                                                    CENTROMERE (test code = 

159486)         NEGATIVE TITER                                  

 

                                                    COARSE SPECKLED (test 

code = 241187)  NEGATIVE TITER                                  

 

                                                    DISCRETE NUCLEAR DOTS 

(test code = 628901) NEGATIVE TITER                                  

 

                                                    NUCLEOLAR (test code = 

137286)         NEGATIVE TITER                                  

 

                                                    NUCLEAR MEMBRANE (test 

code = 663850)  NEGATIVE TITER                                  

 

                                                    CYTO. RETICULAR (PASCUAL) 

(test code = 322131) NEGATIVE                                        

 

                                                    COMMENTS (test code = 

262546)         NONE                                            

 

                                                    METHOD (test code = 

92549)          (NOTE)                                          





Melvin BenitezARTHRITIS [id number] 00:00:00* 



                      Test Item  Value      Reference Range Interpretation Comme

nts

 

                                                    RHEUMATOID FACTOR, QUANT 

(test code = 3502) <10 IU/ML                                       

 

                                                    URIC ACID (test code = 

2233)           3.5 MG/DL                                       

 

                                                    SEDIMENTATION RATE (test 

code = 1017)                            TEST NOT PERFORMED 

MM/HOUR                                                     

 

                                                    ANTI-NUCLEAR ANTIBODIES 

(test code = 3506) NEGATIVE                                        

 

                                                    SUSU PATTERN (REPORTED AS 

TITER) (test code = 

83529)          SEE BELOW                                       

 

                                                    HOMOGENEOUS (test code = 

74238)          NEGATIVE TITER                                  

 

                                                    SPECKLED (test code = 

559621)         NEGATIVE TITER                                  

 

                                                    DENSE FINE SPECKLED (test 

code = 79812)   NEGATIVE TITER                                  

 

                                                    CENTROMERE (test code = 

771511)         NEGATIVE TITER                                  

 

                                                    COARSE SPECKLED (test 

code = 699704)  NEGATIVE TITER                                  

 

                                                    DISCRETE NUCLEAR DOTS 

(test code = 082792) NEGATIVE TITER                                  

 

                                                    NUCLEOLAR (test code = 

976324)         NEGATIVE TITER                                  

 

                                                    NUCLEAR MEMBRANE (test 

code = 451786)  NEGATIVE TITER                                  

 

                                                    CYTO. RETICULAR (PASCUAL) 

(test code = 597802) NEGATIVE                                        

 

                                                    COMMENTS (test code = 

852911)         NONE                                            

 

                                                    METHOD (test code = 

94702)          (NOTE)                                          





Melvin WOODWARD AustinPOCT PREGNANCY [id number] 19:56:00* 



                      Test Item  Value      Reference Range Interpretation Comme

nts

 

                      POCT PREG (test code = 1605) Negative                     

    

 

                                                    On board controls acceptable

 with 

C Line (test code = 3574) Present                                         

 

                      POCT PREG LOT # test code = 2693) [id number]                 

          

 

                                                    POCT PREG TEST  DATE (

test 

code = 3576)    2024                                      

 

                                                    Lab Interpretation (test cod

e = 

52112-3)        Normal                                          





[facility] SystemCULTONEIL, [id number] 15:44:19SPECIMEN 
NUMBER: [id number] CULTURE, URINE SPECIMEN NUMBER: [id number] SPECIMEN COMMENT: 
URINE SOURCE: URINE REPORT STATUS: FINAL ISOLATE NUMBER 1: IDENTIFICATION: 
2022 ,000 CFU/ML BETA-STREPTOCOCCUS GROUP B ADDITIONAL OBSERVATIONS:
PENICILLIN AND AMPICILLIN ARE DRUGS OF CHOICE FOR TREATMENT OF B-HEMOLYTIC 
STREPTOCOCCAL INFECTIONS. SUSCEPTIBILITY TESTING OF PENICILLIN AND OTHER 
B-LACTAMS APPROVED BY THE [place] [employer] FOR TREATMENT OF B-
HEMOLYTIC STREPTOCOCCAL INFECTIONS NEED NOT BE PERFORMED ROUTINELY. ADDITIONAL 
OBSERVATIONS: 2022 10-50,000 CFU/ML UROGENITALFLORA PRESENT NO COMMON 
PATHOGENS UNLESS OTHERWISE INDICATED, ALL TESTING PERFORMED ATCLINICAL PATHOLOG[facility] [address] [place] [address] : CHARLES HUNT M.D. CLIA NUMBER [id number] [facility] [place]. [id number], 
[id number] 00:00:00* 



                      Test Item  Value      Reference Range Interpretation Comme

nts

 

                                                    CULTURE, URINE (test 

code = 99564)                           SPECIMEN NUMBER: 

[id number]                                                   





Melvin AdkinsLTONEIL, [id number] 00:00:00* 



                      Test Item  Value      Reference Range Interpretation Comme

nts

 

                                                    CULTURE, URINE (test 

code = 03393)                           SPECIMEN NUMBER: 

[id number]                                                   





Melvin BenitezCULTURE, [id number] 00:00:00* 



                      Test Item  Value      Reference Range Interpretation Comme

nts

 

                                                    CULTURE, URINE (test 

code = 99486)                           SPECIMEN NUMBER: 

[id number]                                                   





Melvin [facility] AustinVAGINAL PATH[facility] DNA [id number] 00:00:00* 



                      Test Item  Value      Reference Range Interpretation Comme

nts

 

                      TETO SPECIES (test code = 81690) NEGATIVE              

           

 

                      G. VAGINALIS (test code = ) NEGATIVE                 

        

 

                      T. VAGINALIS (test code = ) NEGATIVE                 

        





[facility] [id number] 00:00:00* 



                      Test Item  Value      Reference Range Interpretation Comme

nts

 

                      TETO SPECIES (test code = 12137) NEGATIVE              

           

 

                      G. VAGINALIS (test code = 90879) NEGATIVE                 

        

 

                      T. VAGINALIS (test code = 96653) NEGATIVE                 

        





[facility] [id number] 00:00:00* 



                      Test Item  Value      Reference Range Interpretation Comme

nts

 

                      TETO SPECIES (test code = 87718) NEGATIVE              

           

 

                      G. VAGINALIS (test code = 72972) NEGATIVE                 

        

 

                      T. VAGINALIS (test code = 33877) NEGATIVE                 

        





[facility] PANEL2022-10-22 14:58:13* 



                      Test Item  Value      Reference Range Interpretation Comme

nts

 

                                                    TETO SPECIES (test 

code = 54032)   NEGATIVE        NEGATIVE                        

 

                                                    G. VAGINALIS (test 

code = 90182)   NEGATIVE        NEGATIVE                        

 

                                                    T. VAGINALIS (test 

code = 39359)   NEGATIVE        NEGATIVE                        UNLESS OTHERWISE

 

INDICATED, ALL TESTING 

PERFORMED ATCLINICAL 

PATHOLOGY [facility] [address] [place] [address]  

: 

CHARLES HUNT M.D. 

IA NUMBER [id number] 

CAP ACCREDITATION NO. 

[id number]





VAGINAL PATHOGENS DNA PANEL2022-10-22 00:00:00* 



                      Test Item  Value      Reference Range Interpretation Comme

nts

 

                      TETO SPECIES (test code = 27122) NEGATIVE              

           

 

                      G. VAGINALIS (test code = 16901) NEGATIVE                 

        

 

                      T. VAGINALIS (test code = 08256) NEGATIVE                 

        





Melvin [facility] Austin[facility] PANEL2022-10-22 00:00:00* 



                      Test Item  Value      Reference Range Interpretation Comme

nts

 

                      TETO SPECIES (test code = 52969) NEGATIVE              

           

 

                      G. VAGINALIS (test code = 92859) NEGATIVE                 

        

 

                      T. VAGINALIS (test code = 51751) NEGATIVE                 

        





Melvin [facility] PANEL2022-10-22 00:00:00* 



                      Test Item  Value      Reference Range Interpretation Comme

nts

 

                      TETO SPECIES (test code = 16909) NEGATIVE              

           

 

                      G. VAGINALIS (test code = 49000) NEGATIVE                 

        

 

                      T. VAGINALIS (test code = 05493) NEGATIVE                 

        





Melvin BenitezCULTONEIL, [id number] 15:31:58SPECIMEN NUMBER: [id number] 
CULTURE, URINE SPECIMEN NUMBER: [id number] SPECIMEN COMMENT: URINE SOURCE: URINE 
REPORT STATUS: FINAL ISOLATE NUMBER 1: IDENTIFICATION: 10/14/2022 10-50,000 
CFU/ML BETA-STREPTOCOCCUS GROUP B ADDITIONAL OBSERVATIONS:  PENICILLIN AND 
AMPICILLIN ARE DRUGS OF CHOICE FOR TREATMENT OF B-HEMOLYTIC STREPTOCOCCAL 
INFECTIONS. SUSCEPTIBILITY TESTING OF PENICILLIN AND OTHER  B-LACTAMS APPROVED 
BY THE [employer] FOR TREATMENT OF B-HEMOLYTIC 
STREPTOCOCCAL INFECTIONS NEED NOT BE PERFORMED ROUTINELY. UNLESS OTHERWISE 
INDICATED, ALL TESTING PERFORMED nfon PATHOLOGY [facility] [address] [place] [address] : CHARLES HUNT M.D. CLIA 
NUMBER [id number] [place]. [id number], URINE2022-10-14 
00:00:00* 



                      Test Item  Value      Reference Range Interpretation Comme

nts

 

                                                    CULTURE, URINE (test 

code = 58467)                           SPECIMEN NUMBER: 

[id number]                                                   





Melvin BenitezCULTURE, URINE2022-10-14 00:00:00* 



                      Test Item  Value      Reference Range Interpretation Comme

nts

 

                                                    CULTURE, URINE (test 

code = 79274)                           SPECIMEN NUMBER: 

[id number]                                                   





Melvin AdkinsLTONEIL, URINE2022-10-14 00:00:00* 



                      Test Item  Value      Reference Range Interpretation Comme

nts

 

                                                    CULTURE, URINE (test 

code = 38093)                           SPECIMEN NUMBER: 

[id number]                                                   





Melvin AdkinsLTURE, URINE2022-10-14 00:00:00* 



                      Test Item  Value      Reference Range Interpretation Comme

nts

 

                                                    CULTURE, URINE (test 

code = 25640)                           SPECIMEN NUMBER: 

[id number]                                                   





CULTURE, [id number] 09:22:37SPECIMEN NUMBER: [id number] CULTURE, URINE 
SPECIMEN NUMBER: [id number] SPECIMEN COMMENT: URINE SOURCE: URINE REPORT STATUS: 
FINAL ISOLATE NUMBER 1: IDENTIFICATION: 2022 >100,000 CFU/ML BETA-STR
EPTOCOCCUS GROUP B ADDITIONAL OBSERVATIONS: PENICILLIN AND AMPICILLIN ARE DRUGS 
OF CHOICE FOR TREATMENT OF B-HEMOLYTIC STREPTOCOCCAL INFECTIONS. SUSCEPTIBILITY 
TESTING OF PENICILLIN AND OTHER B-LACTAMS APPROVED BY THE [place] [employer] 
ADMINISTRATION FOR TREATMENT OF B-HEMOLYTIC STREPTOCOCCAL INFECTIONS NEED NOT BE
PERFORMED ROUTINELY. ADDITIONAL OBSERVATIONS: 2022 >100,000 CFU/ML MIXED 
UROGENITAL FRANDY UNLESS OTHERWISE INDICATED, ALL TESTING PERFORMED nfon 
PATHOLOGY [facility] [address] [place] [address]  : 
CHARLES HUNT M.D. CLIA NUMBER [id number] [place]. [id number]
CULTURE, [id number] 00:00:00* 



                      Test Item  Value      Reference Range Interpretation Comme

nts

 

                                                    CULTURE, URINE (test 

code = 78181)                           SPECIMEN NUMBER: 

[id number]                                                   





Melvin BenitezCULTURE, [id number] 00:00:00* 



                      Test Item  Value      Reference Range Interpretation Comme

nts

 

                                                    CULTURE, URINE (test 

code = 93776)                           SPECIMEN NUMBER: 

[id number]                                                   





Melvin BenitezCULTURE, [id number] 00:00:00* 



                      Test Item  Value      Reference Range Interpretation Comme

nts

 

                                                    CULTURE, URINE (test 

code = 47150)                           SPECIMEN NUMBER: 

[id number]                                                   





Melvin BenitezCULTURE, [id number] 00:00:00* 



                      Test Item  Value      Reference Range Interpretation Comme

nts

 

                                                    CULTURE, URINE (test 

code = 34012)                           SPECIMEN NUMBER: 

[id number]                                                   





CULTURE, [id number] 00:00:00* 



                      Test Item  Value      Reference Range Interpretation Comme

nts

 

                                                    CULTURE, URINE (test 

code = 30885)                           SPECIMEN NUMBER: 

[id number]                                                   





CT/NG, NAAT, [id number] 18:58:33* 



                      Test Item  Value      Reference Range Interpretation Comme

nts

 

                                                    GONORRHEA, NAAT 

(test code = 

67663)          NEGATIVE        NEGATIVE                        *** IMPORTANT NO

ISIDRO: SEE 

ANNOUNCEMENT AT *** 

[email or web address]

[employer] Note: Assay 

methodology is nucleic acid 

amplification by 

transcription mediated 

amplification (TMA) 

utilizing the Aptima Combo 

2 Assay.

 

                                                    CHLAMYDIA, NAAT 

(test code = 

17818)          NEGATIVE        NEGATIVE                        *** IMPORTANT NO

ISIDRO: SEE 

ANNOUNCEMENT AT *** 

[email or web address]

[email or web address] Note: Assay 

methodology is nucleic acid 

amplification by 

transcription mediated 

amplification (TMA) 

utilizing the Aptima Combo 

2 Assay.





VAGINAL PATHOGENS DNA [id number] 15:36:52* 



                      Test Item  Value      Reference Range Interpretation Comme

nts

 

                      TETO SPECIES (test code = 86625) NEGATIVE   NEGATIVE   

           

 

                      G. VAGINALIS (test code = 60335) NEGATIVE   NEGATIVE      

        

 

                      T. VAGINALIS (test code = 20261) NEGATIVE   NEGATIVE      

        





[id number] 03:41:31* 



                      Test Item  Value      Reference Range Interpretation Comme

nts

 

                                                    RPR RESULT (test code = 

3501)           NON-REACTIVE    NON-REACTIVE                    

 

                      RPR TITER (test code = 3500) NOT INDIC. TITER NOT INDIC.  

          





HIV 1/2 4TH GEN, RFLX [id number] 03:33:35* 



                      Test Item  Value      Reference Range Interpretation Comme

nts

 

                                                    HIV 1/2 4TH GEN, RFLX CONF (

test 

code = 3514)    NON-REACTIVE    NON-REACTIVE                    





HEPATITIS PANEL, [id number] 03:33:35* 



                      Test Item  Value      Reference Range Interpretation Comme

nts

 

                                                    HEPATITIS A IgM (test 

code = 99967)   NON-REACTIVE    NON-REACTIVE                    

 

                                                    HEPATITIS B CORE IgM 

(test code = 4644) NON-REACTIVE    NON-REACTIVE                    

 

                                                    HEPATITIS B SURF AG 

(test code = 2739) NON-REACTIVE    NON-REACTIVE                    

 

                                                    HEPATITIS C ANTIBODY 

(test code = 4675) NON-REACTIVE    NON-REACTIVE                    

 

                                                    INTERPRETATION 

HEPATITIS A: (test 

code = 2552)    (NOTE)                                          Hepatitis A sero

logy 

shows no evidence of 

acute hepatitis A.

 

                                                    INTERPRETATION 

HEPATITIS B: (test 

code = 39736)   (NOTE)                                          Hepatitis B sero

logy 

shows no evidence of 

acute hepatitis B 

andno indication of 

exposure to 

hepatitis B virus in 

the previous malcolm 

eight months.

 

                                                    INTERPRETATION 

HEPATITIS C: (test 

code = 88599)   (NOTE)                                          Hepatitis C sero

logy 

shows no evidence of 

exposure to 

hepatitisC virus at 

this time. It can 

take up to 12 months 

after exposure tothe 

hepatitis C virus 

for antibodies to 

become detectable in 

the blood in certain 

patients. UNLESS 

OTHERWISE INDICATED, 

ALL TESTING 

[facility] 

[address] [place] [address] LABORATORY 

DIRECTOR: CHARLES HUNT M.D. CLIA 

NUMBER [id number] 

[place] 

[place]. [id number]





GC AND CHLAMYDIA, AMPLIFIED, [id number] 00:00:00* 



                      Test Item  Value      Reference Range Interpretation Comme

nts

 

                      GONORRHEA, NAAT (test code = 84042) NEGATIVE              

           

 

                      CHLAMYDIA, NAAT (test code = 34563) NEGATIVE              

           





Melvin TRACE AustinHIV AB/AG COMBO RFLX [id number] 00:00:00* 



                      Test Item  Value      Reference Range Interpretation Comme

nts

 

                                                    HIV 1/2 4TH GEN, RFLX CONF (

test 

code = 3514)    NON-REACTIVE                                    





Melvin F [id number] 00:00:00* 



                      Test Item  Value      Reference Range Interpretation Comme

nts

 

                                                    RPR RESULT (test code = 

3501)           NON-REACTIVE                                    

 

                      RPR TITER (test code = 3500) NOT INDIC. TITER             

          





Melvin BenitezACUTE HEPATITIS [id number] 00:00:00* 



                      Test Item  Value      Reference Range Interpretation Comme

nts

 

                                                    HEPATITIS A IgM (test code =

 

11144)          NON-REACTIVE                                    

 

                                                    HEPATITIS B CORE IgM (test c

ode 

= 4644)         NON-REACTIVE                                    

 

                                                    HEPATITIS B SURF AG (test co

de = 

2739)           NON-REACTIVE                                    

 

                                                    HEPATITIS C ANTIBODY (test c

ode 

= 4675)         NON-REACTIVE                                    

 

                                                    INTERPRETATION HEPATITIS A: 

(test code = 2552) (NOTE)                                          

 

                                                    INTERPRETATION HEPATITIS B: 

(test code = 33857) (NOTE)                                          

 

                                                    INTERPRETATION HEPATITIS C: 

(test code = 36545) (NOTE)                                          





Melvin BenitezVAGINAL PATHOGENS DNA [id number] 00:00:00* 



                      Test Item  Value      Reference Range Interpretation Comme

nts

 

                      TETO SPECIES (test code = 31315) NEGATIVE              

           

 

                      G. VAGINALIS (test code = 92678) NEGATIVE                 

        

 

                      T. VAGINALIS (test code = 87350) NEGATIVE                 

        





Melvin BenitezGC AND CHLAMYDIA, AMPLIFIED, [id number] 00:00:00* 



                      Test Item  Value      Reference Range Interpretation Comme

nts

 

                      GONORRHEA, NAAT (test code = 00568) NEGATIVE              

           

 

                      CHLAMYDIA, NAAT (test code = 32835) NEGATIVE              

           





Melvin BenitezHIV AB/AG COMBO RFLX [id number] 00:00:00* 



                      Test Item  Value      Reference Range Interpretation Comme

nts

 

                                                    HIV 1/2 4TH GEN, RFLX CONF (

test 

code = 3514)    NON-REACTIVE                                    





Melvin [id number] 00:00:00* 



                      Test Item  Value      Reference Range Interpretation Comme

nts

 

                                                    RPR RESULT (test code = 

3501)           NON-REACTIVE                                    

 

                      RPR TITER (test code = 3500) NOT INDIC. TITER             

          





Melvin BenitezACUTE HEPATITIS [id number] 00:00:00* 



                      Test Item  Value      Reference Range Interpretation Comme

nts

 

                                                    HEPATITIS A IgM (test code =

 

81124)          NON-REACTIVE                                    

 

                                                    HEPATITIS B CORE IgM (test c

ode 

= 4644)         NON-REACTIVE                                    

 

                                                    HEPATITIS B SURF AG (test co

de = 

2739)           NON-REACTIVE                                    

 

                                                    HEPATITIS C ANTIBODY (test c

ode 

= 4675)         NON-REACTIVE                                    

 

                                                    INTERPRETATION HEPATITIS A: 

(test code = 2552) (NOTE)                                          

 

                                                    INTERPRETATION HEPATITIS B: 

(test code = 87453) (NOTE)                                          

 

                                                    INTERPRETATION HEPATITIS C: 

(test code = 59641) (NOTE)                                          





Melvin BenitezVAGINAL PATHOGENS DNA [id number] 00:00:00* 



                      Test Item  Value      Reference Range Interpretation Comme

nts

 

                      TETO SPECIES (test code = ) NEGATIVE              

           

 

                      G. VAGINALIS (test code = 38431) NEGATIVE                 

        

 

                      T. VAGINALIS (test code = 25342) NEGATIVE                 

        





Melvin BenitezGC AND CHLAMYDIA, AMPLIFIED, [id number] 00:00:00* 



                      Test Item  Value      Reference Range Interpretation Comme

nts

 

                      GONORRHEA, NAAT (test code = 16705) NEGATIVE              

           

 

                      CHLAMYDIA, NAAT (test code = 68386) NEGATIVE              

           





Melvin BenitezHIV AB/AG COMBO RFLX [id number] 00:00:00* 



                      Test Item  Value      Reference Range Interpretation Comme

nts

 

                                                    HIV 1/2 4TH GEN, RFLX CONF (

test 

code = 3514)    NON-REACTIVE                                    





Melvin [id number] 00:00:00* 



                      Test Item  Value      Reference Range Interpretation Comme

nts

 

                                                    RPR RESULT (test code = 

3501)           NON-REACTIVE                                    

 

                      RPR TITER (test code = 3500) NOT INDIC. TITER             

          





Melvin BenitezACUTE HEPATITIS [id number] 00:00:00* 



                      Test Item  Value      Reference Range Interpretation Comme

nts

 

                                                    HEPATITIS A IgM (test code =

 

43118)          NON-REACTIVE                                    

 

                                                    HEPATITIS B CORE IgM (test c

ode 

= 4644)         NON-REACTIVE                                    

 

                                                    HEPATITIS B SURF AG (test co

de = 

2739)           NON-REACTIVE                                    

 

                                                    HEPATITIS C ANTIBODY (test c

ode 

= 4675)         NON-REACTIVE                                    

 

                                                    INTERPRETATION HEPATITIS A: 

(test code = 2552) (NOTE)                                          

 

                                                    INTERPRETATION HEPATITIS B: 

(test code = 43198) (NOTE)                                          

 

                                                    INTERPRETATION HEPATITIS C: 

(test code = 41376) (NOTE)                                          





Melvin BenitezVAGINAL PATHOGENS DNA [id number] 00:00:00* 



                      Test Item  Value      Reference Range Interpretation Comme

nts

 

                      TETO SPECIES (test code = ) NEGATIVE              

           

 

                      G. VAGINALIS (test code = 24455) NEGATIVE                 

        

 

                      T. VAGINALIS (test code = 24256) NEGATIVE                 

        





Melvin BenitezVAGINAL PATHOGENS DNA [id number] 00:00:00* 



                      Test Item  Value      Reference Range Interpretation Comme

nts

 

                      TETO SPECIES (test code = 17668) NEGATIVE              

           

 

                      G. VAGINALIS (test code = 37325) NEGATIVE                 

        

 

                      T. VAGINALIS (test code = 76739) NEGATIVE                 

        





GC AND CHLAMYDIA, AMPLIFIED, [id number] 00:00:00* 



                      Test Item  Value      Reference Range Interpretation Comme

nts

 

                      GONORRHEA, NAAT (test code = 76797) NEGATIVE              

           

 

                      CHLAMYDIA, NAAT (test code = 41772) NEGATIVE              

           





HIV AB/AG COMBO RFLX [id number] 00:00:00* 



                      Test Item  Value      Reference Range Interpretation Comme

nts

 

                                                    HIV 1/2 4TH GEN, RFLX CONF (

test 

code = 3514)    NON-REACTIVE                                    





[id number] 00:00:00* 



                      Test Item  Value      Reference Range Interpretation Comme

nts

 

                                                    RPR RESULT (test code = 

3501)           NON-REACTIVE                                    

 

                      RPR TITER (test code = 3500) NOT INDIC. TITER             

          





ACUTE HEPATITIS [id number] 00:00:00* 



                      Test Item  Value      Reference Range Interpretation Comme

nts

 

                                                    HEPATITIS A IgM (test code =

 

95529)          NON-REACTIVE                                    

 

                                                    HEPATITIS B CORE IgM (test c

ode 

= 4644)         NON-REACTIVE                                    

 

                                                    HEPATITIS B SURF AG (test co

de = 

4379)           NON-REACTIVE                                    

 

                                                    HEPATITIS C ANTIBODY (test c

ode 

= 4675)         NON-REACTIVE                                    

 

                                                    INTERPRETATION HEPATITIS A: 

(test code = 2552) (NOTE)                                          

 

                                                    INTERPRETATION HEPATITIS B: 

(test code = 50228) (NOTE)                                          

 

                                                    INTERPRETATION HEPATITIS C: 

(test code = 85850) (NOTE)                                          





VAGINAL PATHOGENS DNA [id number] 00:00:00* 



                      Test Item  Value      Reference Range Interpretation Comme

nts

 

                      TETO SPECIES (test code = 91679) NEGATIVE              

           

 

                      G. VAGINALIS (test code = 05969) NEGATIVE                 

        

 

                      T. VAGINALIS (test code = 19296) NEGATIVE                 

        





GC AND CHLAMYDIA, AMPLIFIED, [id number] 00:00:00* 



                      Test Item  Value      Reference Range Interpretation Comme

nts

 

                      GONORRHEA, NAAT (test code = 81866) NEGATIVE              

           

 

                      CHLAMYDIA, NAAT (test code = 87956) NEGATIVE              

           





HIV AB/AG COMBO RFLX [id number] 00:00:00* 



                      Test Item  Value      Reference Range Interpretation Comme

nts

 

                                                    HIV 1/2 4TH GEN, RFLX CONF (

test 

code = 3514)    NON-REACTIVE                                    





[id number] 00:00:00* 



                      Test Item  Value      Reference Range Interpretation Comme

nts

 

                                                    RPR RESULT (test code = 

3501)           NON-REACTIVE                                    

 

                      RPR TITER (test code = 3500) NOT INDIC. TITER             

          





ACUTE HEPATITIS [id number] 00:00:00* 



                      Test Item  Value      Reference Range Interpretation Comme

nts

 

                                                    HEPATITIS A IgM (test code =

 

13855)          NON-REACTIVE                                    

 

                                                    HEPATITIS B CORE IgM (test c

ode 

= 4685)         NON-REACTIVE                                    

 

                                                    HEPATITIS B SURF AG (test co

de = 

8659)           NON-REACTIVE                                    

 

                                                    HEPATITIS C ANTIBODY (test c

ode 

= 4608)         NON-REACTIVE                                    

 

                                                    INTERPRETATION HEPATITIS A: 

(test code = 2552) (NOTE)                                          

 

                                                    INTERPRETATION HEPATITIS B: 

(test code = 45204) (NOTE)                                          

 

                                                    INTERPRETATION HEPATITIS C: 

(test code = 82968) (NOTE)                                          





COMPREHENSIVE METABOLIC [id number] 00:00:00* 



                      Test Item  Value      Reference Range Interpretation Comme

nts

 

                      GLUCOSE (test code = 2217) 84 MG/DL                       

  

 

                      BUN (test code = 2208) 7 MG/DL                          

 

                      CREATININE (test code = 2214) 0.65 MG/DL                  

     

 

                                                    eGFR ( CKD-EPI) (test 

code = 05725)   121 ML/MIN/1.73                                 

 

                                                    CALC BUN/CREAT (test code = 

2235)           11 RATIO                                        

 

                      SODIUM (test code = 2231) 139 MEQ/L                       

 

 

                      POTASSIUM (test code = 2228) 4.5 MEQ/L                    

    

 

                      CHLORIDE (test code = 2215) 104 MEQ/L                     

   

 

                                                    CARBON DIOXIDE (test code = 

2206)           24 MEQ/L                                        

 

                      CALCIUM (test code = 2209) 9.2 MG/DL                      

  

 

                                                    PROTEIN, TOTAL (test code = 

2229)           6.7 G/DL                                        

 

                      ALBUMIN (test code = 2201) 4.4 G/DL                       

  

 

                                                    CALC GLOBULIN (test code = 

2240)           2.3 G/DL                                        

 

                                                    CALC A/G RATIO (test code = 

2234)           1.9 RATIO                                       

 

                                                    BILIRUBIN, TOTAL (test code 

= 

2207)           0.3 MG/DL                                       

 

                                                    ALKALINE PHOSPHATASE (test 

code = 2204)    101 U/L                                         

 

                      AST (test code = 2218) 19 U/L                           

 

                      ALT (test code = 2219) 17 U/L                           





Melvin WOODWARD AustinLIPID [id number] 00:00:00* 



                      Test Item  Value      Reference Range Interpretation Comme

nts

 

                      CHOLESTEROL (test code = 2210) 136 MG/DL                  

      

 

                      TRIGLYCERIDES (test code = 2232) 158 MG/DL                

        

 

                      HDL CHOLESTEROL (test code = 2220) 34 MG/DL               

          

 

                      CALC LDL CHOL (test code = 2237) 77 MG/DL                 

        

 

                                                    RISK RATIO LDL/HDL (test cod

e = 

2238)           2.26 RATIO                                      





Melvin [id number] 00:00:00* 



                      Test Item  Value      Reference Range Interpretation Comme

nts

 

                                                    TSH, THIRD GENERATION (test 

code 

= 2821)         0.970 UIU/ML                                    





Melvin BenitezCBC W/AUTO [id number] 00:00:00* 



                      Test Item  Value      Reference Range Interpretation Comme

nts

 

                      WBC (test code = 1001) 7.7 K/UL                         

 

                      RBC (test code = 1002) 4.48 M/UL                        

 

                      HEMOGLOBIN (test code = 1003) 14.1 G/DL                   

     

 

                      HEMATOCRIT (test code = 1004) 41.3 %                      

     

 

                      MCV (test code = 1005) 92.2 fL                          

 

                      MCH (test code = 1006) 31.5 PG                          

 

                      MCHC (test code = 1007) 34.1 G/DL                        

 

                      RDW (test code = 1038) 12.9 %                           

 

                      NEUTROPHILS (test code = 1008) 57.6 %                     

      

 

                      LYMPHOCYTES (test code = 1010) 32.8 %                     

      

 

                      MONOCYTES (test code = 1011) 7.0 %                        

    

 

                      EOSINOPHILS (test code = 1012) 2.2 %                      

      

 

                      BASOPHILS (test code = 1013) 0.1 %                        

    

 

                                                    IMMATURE GRANYLOCYTES (test 

code = 1036)    0.3 %                                           

 

                                                    NUCLEATED RBCS (test code = 

1065)           0.0 /100WBC'S                                   

 

                                                    PLATELET COUNT (test code = 

1015)           299 K/UL                                        

 

                                                    ABSOLUTE NEUTROPHILS (test c

ode 

= 1066)         4.45 K/UL                                       

 

                                                    ABSOLUTE LYMPHOCYTES (test c

ode 

= 1067)         2.53 K/UL                                       

 

                                                    ABSOLUTE MONOCYTES (test cod

e = 

1068)           0.54 K/UL                                       

 

                                                    ABSOLUTE EOSINOPHILS (test c

ode 

= 1040)         0.17 K/UL                                       

 

                                                    ABSOLUTE BASOPHILS (test cod

e = 

1069)           0.01 K/UL                                       

 

                                                    ABS IMMATURE GRANULOCYTES (t

est 

code = 1020)    0.02 K/UL                                       

 

                                                    ABS NUCLEATED RBCS (test cod

e = 

51762)          0.00 K/UL                                       





Melvin BenitezCOMPREHENSIVE METABOLIC [id number] 00:00:00* 



                      Test Item  Value      Reference Range Interpretation Comme

nts

 

                      GLUCOSE (test code = 2217) 84 MG/DL                       

  

 

                      BUN (test code = 2208) 7 MG/DL                          

 

                      CREATININE (test code = 2214) 0.65 MG/DL                  

     

 

                                                    eGFR ( CKD-EPI) (test 

code = 06357)   121 ML/MIN/1.73                                 

 

                                                    CALC BUN/CREAT (test code = 

2235)           11 RATIO                                        

 

                      SODIUM (test code = 2231) 139 MEQ/L                       

 

 

                      POTASSIUM (test code = 2228) 4.5 MEQ/L                    

    

 

                      CHLORIDE (test code = 2215) 104 MEQ/L                     

   

 

                                                    CARBON DIOXIDE (test code = 

2206)           24 MEQ/L                                        

 

                      CALCIUM (test code = 2209) 9.2 MG/DL                      

  

 

                                                    PROTEIN, TOTAL (test code = 

2229)           6.7 G/DL                                        

 

                      ALBUMIN (test code = 2201) 4.4 G/DL                       

  

 

                                                    CALC GLOBULIN (test code = 

2240)           2.3 G/DL                                        

 

                                                    CALC A/G RATIO (test code = 

2234)           1.9 RATIO                                       

 

                                                    BILIRUBIN, TOTAL (test code 

= 

2207)           0.3 MG/DL                                       

 

                                                    ALKALINE PHOSPHATASE (test 

code = 2204)    101 U/L                                         

 

                      AST (test code = 2218) 19 U/L                           

 

                      ALT (test code = 2219) 17 U/L                           





Melvin BenitezLIPID [id number] 00:00:00* 



                      Test Item  Value      Reference Range Interpretation Comme

nts

 

                      CHOLESTEROL (test code = 2210) 136 MG/DL                  

      

 

                      TRIGLYCERIDES (test code = 2232) 158 MG/DL                

        

 

                      HDL CHOLESTEROL (test code = 2220) 34 MG/DL               

          

 

                      CALC LDL CHOL (test code = 2237) 77 MG/DL                 

        

 

                                                    RISK RATIO LDL/HDL (test cod

e = 

2238)           2.26 RATIO                                      





Melvin [id number] 00:00:00* 



                      Test Item  Value      Reference Range Interpretation Comme

nts

 

                                                    TSH, THIRD GENERATION (test 

code 

= 2821)         0.970 UIU/ML                                    





Melvin BenitezCBC W/AUTO [id number] 00:00:00* 



                      Test Item  Value      Reference Range Interpretation Comme

nts

 

                      WBC (test code = 1001) 7.7 K/UL                         

 

                      RBC (test code = 1002) 4.48 M/UL                        

 

                      HEMOGLOBIN (test code = 1003) 14.1 G/DL                   

     

 

                      HEMATOCRIT (test code = 1004) 41.3 %                      

     

 

                      MCV (test code = 1005) 92.2 fL                          

 

                      MCH (test code = 1006) 31.5 PG                          

 

                      MCHC (test code = 1007) 34.1 G/DL                        

 

                      RDW (test code = 1038) 12.9 %                           

 

                      NEUTROPHILS (test code = 1008) 57.6 %                     

      

 

                      LYMPHOCYTES (test code = 1010) 32.8 %                     

      

 

                      MONOCYTES (test code = 1011) 7.0 %                        

    

 

                      EOSINOPHILS (test code = 1012) 2.2 %                      

      

 

                      BASOPHILS (test code = 1013) 0.1 %                        

    

 

                                                    IMMATURE GRANYLOCYTES (test 

code = 1036)    0.3 %                                           

 

                                                    NUCLEATED RBCS (test code = 

1065)           0.0 /100WBC'S                                   

 

                                                    PLATELET COUNT (test code = 

1015)           299 K/UL                                        

 

                                                    ABSOLUTE NEUTROPHILS (test c

ode 

= 1066)         4.45 K/UL                                       

 

                                                    ABSOLUTE LYMPHOCYTES (test c

ode 

= 1067)         2.53 K/UL                                       

 

                                                    ABSOLUTE MONOCYTES (test cod

e = 

1068)           0.54 K/UL                                       

 

                                                    ABSOLUTE EOSINOPHILS (test c

ode 

= 1040)         0.17 K/UL                                       

 

                                                    ABSOLUTE BASOPHILS (test cod

e = 

1069)           0.01 K/UL                                       

 

                                                    ABS IMMATURE GRANULOCYTES (t

est 

code = 1020)    0.02 K/UL                                       

 

                                                    ABS NUCLEATED RBCS (test cod

e = 

93698)          0.00 K/UL                                       





Melvin F EmmanuelCOMPREHENSIVE METABOLIC [id number] 00:00:00* 



                      Test Item  Value      Reference Range Interpretation Comme

nts

 

                      GLUCOSE (test code = 2217) 84 MG/DL                       

  

 

                      BUN (test code = 2208) 7 MG/DL                          

 

                      CREATININE (test code = 2214) 0.65 MG/DL                  

     

 

                                                    eGFR ( CKD-EPI) (test 

code = 38137)   121 ML/MIN/1.73                                 

 

                                                    CALC BUN/CREAT (test code = 

2235)           11 RATIO                                        

 

                      SODIUM (test code = 2231) 139 MEQ/L                       

 

 

                      POTASSIUM (test code = 2228) 4.5 MEQ/L                    

    

 

                      CHLORIDE (test code = 2215) 104 MEQ/L                     

   

 

                                                    CARBON DIOXIDE (test code = 

2206)           24 MEQ/L                                        

 

                      CALCIUM (test code = 2209) 9.2 MG/DL                      

  

 

                                                    PROTEIN, TOTAL (test code = 

2229)           6.7 G/DL                                        

 

                      ALBUMIN (test code = 2201) 4.4 G/DL                       

  

 

                                                    CALC GLOBULIN (test code = 

2240)           2.3 G/DL                                        

 

                                                    CALC A/G RATIO (test code = 

2234)           1.9 RATIO                                       

 

                                                    BILIRUBIN, TOTAL (test code 

= 

2207)           0.3 MG/DL                                       

 

                                                    ALKALINE PHOSPHATASE (test 

code = 2204)    101 U/L                                         

 

                      AST (test code = 2218) 19 U/L                           

 

                      ALT (test code = 2219) 17 U/L                           





Melvin WOODWARD EmmanuelLIPID [id number] 00:00:00* 



                      Test Item  Value      Reference Range Interpretation Comme

nts

 

                      CHOLESTEROL (test code = 2210) 136 MG/DL                  

      

 

                      TRIGLYCERIDES (test code = 2232) 158 MG/DL                

        

 

                      HDL CHOLESTEROL (test code = 2220) 34 MG/DL               

          

 

                      CALC LDL CHOL (test code = 2237) 77 MG/DL                 

        

 

                                                    RISK RATIO LDL/HDL (test cod

e = 

2238)           2.26 RATIO                                      





Melvin [id number] 00:00:00* 



                      Test Item  Value      Reference Range Interpretation Comme

nts

 

                                                    TSH, THIRD GENERATION (test 

code 

= 2821)         0.970 UIU/ML                                    





Melvin BenitezCBC W/AUTO [id number] 00:00:00* 



                      Test Item  Value      Reference Range Interpretation Comme

nts

 

                      WBC (test code = 1001) 7.7 K/UL                         

 

                      RBC (test code = 1002) 4.48 M/UL                        

 

                      HEMOGLOBIN (test code = 1003) 14.1 G/DL                   

     

 

                      HEMATOCRIT (test code = 1004) 41.3 %                      

     

 

                      MCV (test code = 1005) 92.2 fL                          

 

                      MCH (test code = 1006) 31.5 PG                          

 

                      MCHC (test code = 1007) 34.1 G/DL                        

 

                      RDW (test code = 1038) 12.9 %                           

 

                      NEUTROPHILS (test code = 1008) 57.6 %                     

      

 

                      LYMPHOCYTES (test code = 1010) 32.8 %                     

      

 

                      MONOCYTES (test code = 1011) 7.0 %                        

    

 

                      EOSINOPHILS (test code = 1012) 2.2 %                      

      

 

                      BASOPHILS (test code = 1013) 0.1 %                        

    

 

                                                    IMMATURE GRANYLOCYTES (test 

code = 1036)    0.3 %                                           

 

                                                    NUCLEATED RBCS (test code = 

1065)           0.0 /100WBC'S                                   

 

                                                    PLATELET COUNT (test code = 

1015)           299 K/UL                                        

 

                                                    ABSOLUTE NEUTROPHILS (test c

ode 

= 1066)         4.45 K/UL                                       

 

                                                    ABSOLUTE LYMPHOCYTES (test c

ode 

= 1067)         2.53 K/UL                                       

 

                                                    ABSOLUTE MONOCYTES (test cod

e = 

1068)           0.54 K/UL                                       

 

                                                    ABSOLUTE EOSINOPHILS (test c

ode 

= 1040)         0.17 K/UL                                       

 

                                                    ABSOLUTE BASOPHILS (test cod

e = 

1069)           0.01 K/UL                                       

 

                                                    ABS IMMATURE GRANULOCYTES (t

est 

code = 1020)    0.02 K/UL                                       

 

                                                    ABS NUCLEATED RBCS (test cod

e = 

53769)          0.00 K/UL                                       





Melvin BenitezCBC W/AUTO [id number] 00:00:00* 



                      Test Item  Value      Reference Range Interpretation Comme

nts

 

                      WBC (test code = 1001) 7.7 K/UL                         

 

                      RBC (test code = 1002) 4.48 M/UL                        

 

                      HEMOGLOBIN (test code = 1003) 14.1 G/DL                   

     

 

                      HEMATOCRIT (test code = 1004) 41.3 %                      

     

 

                      MCV (test code = 1005) 92.2 fL                          

 

                      MCH (test code = 1006) 31.5 PG                          

 

                      MCHC (test code = 1007) 34.1 G/DL                        

 

                      RDW (test code = 1038) 12.9 %                           

 

                      NEUTROPHILS (test code = 1008) 57.6 %                     

      

 

                      LYMPHOCYTES (test code = 1010) 32.8 %                     

      

 

                      MONOCYTES (test code = 1011) 7.0 %                        

    

 

                      EOSINOPHILS (test code = 1012) 2.2 %                      

      

 

                      BASOPHILS (test code = 1013) 0.1 %                        

    

 

                                                    IMMATURE GRANYLOCYTES (test 

code = 1036)    0.3 %                                           

 

                                                    NUCLEATED RBCS (test code = 

1065)           0.0 /100WBC'S                                   

 

                                                    PLATELET COUNT (test code = 

1015)           299 K/UL                                        

 

                                                    ABSOLUTE NEUTROPHILS (test c

ode 

= 1066)         4.45 K/UL                                       

 

                                                    ABSOLUTE LYMPHOCYTES (test c

ode 

= 1067)         2.53 K/UL                                       

 

                                                    ABSOLUTE MONOCYTES (test cod

e = 

1068)           0.54 K/UL                                       

 

                                                    ABSOLUTE EOSINOPHILS (test c

ode 

= 1040)         0.17 K/UL                                       

 

                                                    ABSOLUTE BASOPHILS (test cod

e = 

1069)           0.01 K/UL                                       

 

                                                    ABS IMMATURE GRANULOCYTES (t

est 

code = 1020)    0.02 K/UL                                       

 

                                                    ABS NUCLEATED RBCS (test cod

e = 

08168)          0.00 K/UL                                       





COMPREHENSIVE METABOLIC [id number] 00:00:00* 



                      Test Item  Value      Reference Range Interpretation Comme

nts

 

                      GLUCOSE (test code = 2217) 84 MG/DL                       

  

 

                      BUN (test code = 2208) 7 MG/DL                          

 

                      CREATININE (test code = 2214) 0.65 MG/DL                  

     

 

                                                    eGFR ( CKD-EPI) (test 

code = 83521)   121 ML/MIN/1.73                                 

 

                                                    CALC BUN/CREAT (test code = 

2235)           11 RATIO                                        

 

                      SODIUM (test code = 2231) 139 MEQ/L                       

 

 

                      POTASSIUM (test code = 2228) 4.5 MEQ/L                    

    

 

                      CHLORIDE (test code = 2215) 104 MEQ/L                     

   

 

                                                    CARBON DIOXIDE (test code = 

2206)           24 MEQ/L                                        

 

                      CALCIUM (test code = 2209) 9.2 MG/DL                      

  

 

                                                    PROTEIN, TOTAL (test code = 

2229)           6.7 G/DL                                        

 

                      ALBUMIN (test code = 2201) 4.4 G/DL                       

  

 

                                                    CALC GLOBULIN (test code = 

2240)           2.3 G/DL                                        

 

                                                    CALC A/G RATIO (test code = 

2234)           1.9 RATIO                                       

 

                                                    BILIRUBIN, TOTAL (test code 

= 

2207)           0.3 MG/DL                                       

 

                                                    ALKALINE PHOSPHATASE (test 

code = 2204)    101 U/L                                         

 

                      AST (test code = 2218) 19 U/L                           

 

                      ALT (test code = 2219) 17 U/L                           





LIPID [id number] 00:00:00* 



                      Test Item  Value      Reference Range Interpretation Comme

nts

 

                      CHOLESTEROL (test code = 2210) 136 MG/DL                  

      

 

                      TRIGLYCERIDES (test code = 2232) 158 MG/DL                

        

 

                      HDL CHOLESTEROL (test code = 2220) 34 MG/DL               

          

 

                      CALC LDL CHOL (test code = 2237) 77 MG/DL                 

        

 

                                                    RISK RATIO LDL/HDL (test cod

e = 

2238)           2.26 RATIO                                      





[id number] 00:00:00* 



                      Test Item  Value      Reference Range Interpretation Comme

nts

 

                                                    TSH, THIRD GENERATION (test 

code 

= 2821)         0.970 UIU/ML                                    





CBC W/AUTO [id number] 00:00:00* 



                      Test Item  Value      Reference Range Interpretation Comme

nts

 

                      WBC (test code = 1001) 7.7 K/UL                         

 

                      RBC (test code = 1002) 4.48 M/UL                        

 

                      HEMOGLOBIN (test code = 1003) 14.1 G/DL                   

     

 

                      HEMATOCRIT (test code = 1004) 41.3 %                      

     

 

                      MCV (test code = 1005) 92.2 fL                          

 

                      MCH (test code = 1006) 31.5 PG                          

 

                      MCHC (test code = 1007) 34.1 G/DL                        

 

                      RDW (test code = 1038) 12.9 %                           

 

                      NEUTROPHILS (test code = 1008) 57.6 %                     

      

 

                      LYMPHOCYTES (test code = 1010) 32.8 %                     

      

 

                      MONOCYTES (test code = 1011) 7.0 %                        

    

 

                      EOSINOPHILS (test code = 1012) 2.2 %                      

      

 

                      BASOPHILS (test code = 1013) 0.1 %                        

    

 

                                                    IMMATURE GRANYLOCYTES (test 

code = 1036)    0.3 %                                           

 

                                                    NUCLEATED RBCS (test code = 

1065)           0.0 /100WBC'S                                   

 

                                                    PLATELET COUNT (test code = 

1015)           299 K/UL                                        

 

                                                    ABSOLUTE NEUTROPHILS (test c

ode 

= 1066)         4.45 K/UL                                       

 

                                                    ABSOLUTE LYMPHOCYTES (test c

ode 

= 1067)         2.53 K/UL                                       

 

                                                    ABSOLUTE MONOCYTES (test cod

e = 

1068)           0.54 K/UL                                       

 

                                                    ABSOLUTE EOSINOPHILS (test c

ode 

= 1040)         0.17 K/UL                                       

 

                                                    ABSOLUTE BASOPHILS (test cod

e = 

1069)           0.01 K/UL                                       

 

                                                    ABS IMMATURE GRANULOCYTES (t

est 

code = 1020)    0.02 K/UL                                       

 

                                                    ABS NUCLEATED RBCS (test cod

e = 

96742)          0.00 K/UL                                       





COMPREHENSIVE METABOLIC [id number] 00:00:00* 



                      Test Item  Value      Reference Range Interpretation Comme

nts

 

                      GLUCOSE (test code = 2217) 84 MG/DL                       

  

 

                      BUN (test code = 2208) 7 MG/DL                          

 

                      CREATININE (test code = 2214) 0.65 MG/DL                  

     

 

                                                    eGFR ( CKD-EPI) (test 

code = 91951)   121 ML/MIN/1.73                                 

 

                                                    CALC BUN/CREAT (test code = 

2235)           11 RATIO                                        

 

                      SODIUM (test code = 2231) 139 MEQ/L                       

 

 

                      POTASSIUM (test code = 2228) 4.5 MEQ/L                    

    

 

                      CHLORIDE (test code = 2215) 104 MEQ/L                     

   

 

                                                    CARBON DIOXIDE (test code = 

2206)           24 MEQ/L                                        

 

                      CALCIUM (test code = 2209) 9.2 MG/DL                      

  

 

                                                    PROTEIN, TOTAL (test code = 

2229)           6.7 G/DL                                        

 

                      ALBUMIN (test code = 2201) 4.4 G/DL                       

  

 

                                                    CALC GLOBULIN (test code = 

2240)           2.3 G/DL                                        

 

                                                    CALC A/G RATIO (test code = 

2234)           1.9 RATIO                                       

 

                                                    BILIRUBIN, TOTAL (test code 

= 

2207)           0.3 MG/DL                                       

 

                                                    ALKALINE PHOSPHATASE (test 

code = 2204)    101 U/L                                         

 

                      AST (test code = 2218) 19 U/L                           

 

                      ALT (test code = 2219) 17 U/L                           





LIPID [id number] 00:00:00* 



                      Test Item  Value      Reference Range Interpretation Comme

nts

 

                      CHOLESTEROL (test code = 2210) 136 MG/DL                  

      

 

                      TRIGLYCERIDES (test code = 2232) 158 MG/DL                

        

 

                      HDL CHOLESTEROL (test code = 2220) 34 MG/DL               

          

 

                      CALC LDL CHOL (test code = 2237) 77 MG/DL                 

        

 

                                                    RISK RATIO LDL/HDL (test cod

e = 

2238)           2.26 RATIO                                      





[id number] 00:00:00* 



                      Test Item  Value      Reference Range Interpretation Comme

nts

 

                                                    TSH, THIRD GENERATION (test 

code 

= 2821)         0.970 UIU/ML                                    





VAGINAL PATHOGENS DNA [id number] 00:00:00* 



                      Test Item  Value      Reference Range Interpretation Comme

nts

 

                      TETO SPECIES (test code = ) NEGATIVE              

           

 

                      G. VAGINALIS (test code = 77964) NEGATIVE                 

        

 

                      T. VAGINALIS (test code = 04908) NEGATIVE                 

        





Melvin F AustinVAGINAL PATHOGENS DNA [id number] 00:00:00* 



                      Test Item  Value      Reference Range Interpretation Comme

nts

 

                      TETO SPECIES (test code = 85890) NEGATIVE              

           

 

                      G. VAGINALIS (test code = 22301) NEGATIVE                 

        

 

                      T. VAGINALIS (test code = 24364) NEGATIVE                 

        





Melvin F AustinVAGINAL PATHOGENS DNA [id number] 00:00:00* 



                      Test Item  Value      Reference Range Interpretation Comme

nts

 

                      TETO SPECIES (test code = 95622) NEGATIVE              

           

 

                      G. VAGINALIS (test code = 84915) NEGATIVE                 

        

 

                      T. VAGINALIS (test code = 68574) NEGATIVE                 

        





Melvin F AustinVAGINAL PATHOGENS DNA [id number] 00:00:00* 



                      Test Item  Value      Reference Range Interpretation Comme

nts

 

                      TETO SPECIES (test code = 03029) NEGATIVE              

           

 

                      G. VAGINALIS (test code = 59573) NEGATIVE                 

        

 

                      T. VAGINALIS (test code = 10934) NEGATIVE                 

        





VAGINAL PATHOGENS DNA [id number] 00:00:00* 



                      Test Item  Value      Reference Range Interpretation Comme

nts

 

                      TETO SPECIES (test code = 86482) NEGATIVE              

           

 

                      G. VAGINALIS (test code = 02554) NEGATIVE                 

        

 

                      T. VAGINALIS (test code = 64419) NEGATIVE                 

        





VAGINAL PATHOGENS DNA [id number] 00:00:00* 



                      Test Item  Value      Reference Range Interpretation Comme

nts

 

                      TETO SPECIES (test code = 80223) NEGATIVE              

           

 

                      G. VAGINALIS (test code = 78025) NEGATIVE                 

        

 

                      T. VAGINALIS (test code = 09718) NEGATIVE                 

        





Melvin F AustinVAGINAL PATHOGENS DNA [id number] 00:00:00* 



                      Test Item  Value      Reference Range Interpretation Comme

nts

 

                      TETO SPECIES (test code = 01086) NEGATIVE              

           

 

                      G. VAGINALIS (test code = 93304) NEGATIVE                 

        

 

                      T. VAGINALIS (test code = 21823) NEGATIVE                 

        





Melvin F AustinVAGINAL PATHOGENS DNA [id number] 00:00:00* 



                      Test Item  Value      Reference Range Interpretation Comme

nts

 

                      TETO SPECIES (test code = 46730) NEGATIVE              

           

 

                      G. VAGINALIS (test code = 25763) NEGATIVE                 

        

 

                      T. VAGINALIS (test code = 35550) NEGATIVE                 

        





Melvin F AustinVAGINAL PATHOGENS DNA [id number] 00:00:00* 



                      Test Item  Value      Reference Range Interpretation Comme

nts

 

                      TETO SPECIES (test code = ) NEGATIVE              

           

 

                      G. VAGINALIS (test code = ) NEGATIVE                 

        

 

                      T. VAGINALIS (test code = ) NEGATIVE                 

        





VAGINAL PATHOGENS DNA [id number] 00:00:00* 



                      Test Item  Value      Reference Range Interpretation Comme

nts

 

                      TETO SPECIES (test code = ) NEGATIVE              

           

 

                      G. VAGINALIS (test code = ) NEGATIVE                 

        

 

                      T. VAGINALIS (test code = ) NEGATIVE                 

        





SURGICAL [id number] 18:10:00* 



                      Test Item  Value      Reference Range Interpretation Comme

nts

 

                                                    SURGICAL SPECIMENS 

(test code = SURG)                      -------------------------

-------------------------

-------------------------

-----------------RUN 

DATE: 21 [facility] - LAB 

PAGE 1 RUN TIME:  

Specimen Inquiry RUN 

USER: INTERFACE  

-------------------------

-----------------PATIENT: 

EDVIN HOUSE ACCT 

#: [id number] LOC: N.2A 

U #: [id number] AGE/SX: 

30/F ROOM: [facility] [address] DR: 

Josué Flores MD : 

90 BED: 1 DIS: 

21 STATUS: DIS IN 

TLOC: 

-------------------------

----------------- SPEC #: 

[id number] RECD: 

 STATUS: 

ZHEN VILLEDA #: [id number] 

LAUREN:  SUBM 

DR: Josué Flores MD 

ENTERED:  SP 

TYPE: SURG OTHR DR: 

ORDERED: LEVEL II, PATH 

SPEC, H E STAIN TISSUES: 

A. FALLOPIAN TUBE 

STERILIZATION - Left and 

right tubal CLINICAL 

HISTORY 

Diagnosis/Clinical Data: 

Tubal ligation Operative 

Procedure: Postpartum 

tubal ligation FINAL 

DIAGNOSIS Left and right 

fallopian tubes, 

bilateral tubal ligation: 

Fallopian tube tissues 

identified, transected (x 

2). Electronically signed 

by: Lauri Thakkar MD 

2021 GROSS 

DESCRIPTION Received in 

formalin labeled "left 

and right tubal" are two, 

undesignated tan-pink, 

partial fallopian tube 

segments measuring 0.6 

and 0.8 cm in length by 

0.3 cm in diameter. The 

serosal surfaces are 

tan-pink and smooth with 

no masses identified. A 

representative 

cross-section from each 

segment is submitted in 

A1-A2. TR 2021 

05:13 PM MICROSCOPIC 

DESCRIPTION Sections of 

both fallopian tubes show 

a central lumen 

surrounded by smooth 

muscle. The mucosal 

plicae are lined by 

columnar epithelium and 

there is no significant 

acute or chronic 

inflammation. Both tubes 

have been completely 

transected.--------------

-------------------------

--- Signed SIGNATURE ON 

FILE Lauri Thakkar MD 

21 655 

-------------------------

----------------- ** END 

OF REPORT **                                                





[id number] 10:05:00* 



                      Test Item  Value      Reference Range Interpretation Comme

nts

 

                      GLUBED (test code = GLUBED) 94 MG/DL        N       

   





CBC W/AUTO [id number] 04:51:00* 



                      Test Item  Value      Reference Range Interpretation Comme

nts

 

                                                    WHITE BLOOD CELL (test code 

= 

WBC)            14.2 x10 3/uL   3.2-11.5        H               

 

                                                    RED BLOOD CELL (test code = 

RBC)            3.70 x10(6)/m   3.70-5.10       N               

 

                      HEMOGLOBIN (test code = HGB) 11.3 g/dL  12.0-15.0  L      

    

 

                      HEMATOCRIT (test code = HCT) 35.3 %     35.7-44.8  L      

    

 

                                                    MEAN CELL VOLUME (test code 

= 

MCV)            95 fL                     N               

 

                      MEAN CELL HGB (test code = MCH) 30.5 pg    26.2-33.8  N   

       

 

                                                    MEAN CELL HGB CONCENTRATION 

(test code = MCHC) 32.0 g/dL       30.0-34.0       N               

 

                                                    RED CELL DISTRIBUTION WIDTH 

(test code = RDW) 13.1 %          11.3-14.5       N               

 

                                                    PLATELET COUNT (test code = 

PLT)            253 x10 3/uL    130-408         N               

 

                                                    MEAN PLATELET VOLUME (test c

ode 

= MPV)          10.9 fL         8.6-12.6        N               

 

                      NEUTROPHIL % (test code = NT%) 74.2 %     40.0-70.0  H    

      

 

                                                    IMMATURE GRANULOCYTE % (test

 

code = IG%)     0.4 %           0.0-2.0         N               

 

                      LYMPHOCYTE % (test code = LY%) 14.7 %     20-40      L    

      

 

                      MONOCYTE % (test code = MO%) 10.0 %     1-10       N      

    

 

                      EOSINOPHIL % (test code = EO%) 0.6 %      0.0-5.0    N    

      

 

                      BASOPHIL % (test code = BA%) 0.1 %      0.0-1.0    N      

    

 

                                                    NUCLEATED RBC % (test code =

 

NRBC%)          0.0 %           0.0-0.9         N               

 

                      NEUTROPHIL # (test code = NT#) 10.6 x10 3/uL 1.6-7.2    H 

         

 

                      LYMPHOCYTE # (test code = LY#) 2.09 x10 3/uL 1.1-2.7    N 

         

 

                      MONOCYTE # (test code = MO#) 1.4 x10 3/uL 0.3-0.8    H    

      

 

                      EOSINOPHIL # (test code = EO#) 0.1 x10 3/uL 0.0-0.5    N  

        

 

                      BASOPHIL # (test code = BA#) 0.0 x10 3/uL 0.0-0.1    N    

      





RAPID PLASMA [id number] 10:16:00* 



                      Test Item  Value      Reference Range Interpretation Comme

nts

 

                                                    RAPID PLASMA REAGIN (test co

de = 

RPR)            NEGATIVE        NEGATIVE                        





[id number] 09:20:00* 



                      Test Item  Value      Reference Range Interpretation Comme

nts

 

                      GLUBED (test code = GLUBED) 88 MG/DL        N       

   





AG HEPATITIS B [id number] 04:56:00* 



                      Test Item  Value      Reference Range Interpretation Comme

nts

 

                                                    AG HEPATITIS B SURFACE (test

 code = 

HBSAG)          NEGATIVE        NEGATIVE                        





AB HIV 1  04:56:00* 



                      Test Item  Value      Reference Range Interpretation Comme

nts

 

                      AB HIV 1 2 (test code = UNR17AQ) NEGATIVE   NEGATIVE      

        





AB RUBELLA [id number] 02:47:00* 



                      Test Item  Value      Reference Range Interpretation Comme

nts

 

                                                    AB RUBELLA IGG 

(test code = 

RUBGAB)         POSITIVE        NEGATIVE        A               Interpretive Huber

a: Rubella 

IgG Concentrations between 

>= 10 IU/mL and < 15 IU/mL 

are considered 

indeterminate for 

determining immunity to 

rubella. Studies suggest 

that vaccinated 

individuals having these 

low levels of anti-rubella 

IgG do show a secondary 

immune response following 

re-vaccination but have 

not been challenged with 

wild rubella virus (9). A 

follow-up sample should be 

taken to further evaluate 

immune status. If the 

repeat sample is still 

indeterminate, the sample 

may require testing by 

alternate methods





CBC W/AUTO [id number] 02:02:00* 



                      Test Item  Value      Reference Range Interpretation Comme

nts

 

                                                    WHITE BLOOD CELL (test code 

= 

WBC)            12.9 x10 3/uL   3.2-11.5        H               

 

                                                    RED BLOOD CELL (test code = 

RBC)            4.10 x10(6)/m   3.70-5.10       N               

 

                      HEMOGLOBIN (test code = HGB) 12.8 g/dL  12.0-15.0  N      

    

 

                      HEMATOCRIT (test code = HCT) 38.2 %     35.7-44.8  N      

    

 

                                                    MEAN CELL VOLUME (test code 

= 

MCV)            93 fL                     N               

 

                      MEAN CELL HGB (test code = MCH) 31.2 pg    26.2-33.8  N   

       

 

                                                    MEAN CELL HGB CONCENTRATION 

(test code = MCHC) 33.5 g/dL       30.0-34.0       N               

 

                                                    RED CELL DISTRIBUTION WIDTH 

(test code = RDW) 13.2 %          11.3-14.5       N               

 

                                                    PLATELET COUNT (test code = 

PLT)            297 x10 3/uL    130-408         N               

 

                                                    MEAN PLATELET VOLUME (test c

ode 

= MPV)          10.2 fL         8.6-12.6        N               

 

                      NEUTROPHIL % (test code = NT%) 70.7 %     40.0-70.0  H    

      

 

                                                    IMMATURE GRANULOCYTE % (test

 

code = IG%)     0.4 %           0.0-2.0         N               

 

                      LYMPHOCYTE % (test code = LY%) 18.9 %     20-40      L    

      

 

                      MONOCYTE % (test code = MO%) 9.3 %      1-10       N      

    

 

                      EOSINOPHIL % (test code = EO%) 0.5 %      0.0-5.0    N    

      

 

                      BASOPHIL % (test code = BA%) 0.2 %      0.0-1.0    N      

    

 

                                                    NUCLEATED RBC % (test code =

 

NRBC%)          0.0 %           0.0-0.9         N               

 

                      NEUTROPHIL # (test code = NT#) 9.1 x10 3/uL 1.6-7.2    H  

        

 

                      LYMPHOCYTE # (test code = LY#) 2.43 x10 3/uL 1.1-2.7    N 

         

 

                      MONOCYTE # (test code = MO#) 1.2 x10 3/uL 0.3-0.8    H    

      

 

                      EOSINOPHIL # (test code = EO#) 0.1 x10 3/uL 0.0-0.5    N  

        

 

                      BASOPHIL # (test code = BA#) 0.0 x10 3/uL 0.0-0.1    N    

      





COVID 19 Asymptomatic IH [id number] 02:01:00* 



                      Test Item  Value      Reference Range Interpretation Comme

nts

 

                                                    COVID 19 Asymptomatic 

IH AG (test code = 

COVNONPUIAG)    Negative        Negative                        Negative results

 should 

be treated as 

presumptive 

andconfirmed with a 

molecular assay, if 

necessary for 

patientmanagement. 

Negative results do not 

rule out COVID-19 

andshould not be used 

as the sole basis for 

treatment orpatient 

management decisions, 

including infection 

controldecisions. 

Negative results should 

be considered in 

thecontext of a 

patient's recent 

exposures, history and 

thepresence of clnical 

signs and symptoms 

consistent 

withCOVID-19.Specimen 

Source: Nasopharyngeal 

(NP) Swab





CBC W/AUTO [id number] 02:00:00* 



                      Test Item  Value      Reference Range Interpretation Comme

nts

 

                                                    WHITE BLOOD CELL (test code 

= 

WBC)            12.9 x10 3/uL   3.2-11.5        H               

 

                                                    RED BLOOD CELL (test code = 

RBC)            4.10 x10(6)/m   3.70-5.10       N               

 

                      HEMOGLOBIN (test code = HGB) 12.8 g/dL  12.0-15.0  N      

    

 

                      HEMATOCRIT (test code = HCT) 38.2 %     35.7-44.8  N      

    

 

                                                    MEAN CELL VOLUME (test code 

= 

MCV)            93 fL                     N               

 

                      MEAN CELL HGB (test code = MCH) 31.2 pg    26.2-33.8  N   

       

 

                                                    MEAN CELL HGB CONCENTRATION 

(test code = MCHC) 33.5 g/dL       30.0-34.0       N               

 

                                                    RED CELL DISTRIBUTION WIDTH 

(test code = RDW) %               11.3-14.5                       

 

                                                    PLATELET COUNT (test code = 

PLT)            x10 3/uL        130-408                         

 

                                                    MEAN PLATELET VOLUME (test c

ode 

= MPV)          10.2 fL         8.6-12.6        N               

 

                      NEUTROPHIL % (test code = NT%) %          40.0-70.0       

      

 

                      LYMPHOCYTE % (test code = LY%) %          20-40           

      

 

                      MONOCYTE % (test code = MO%) %          1-10              

    

 

                      EOSINOPHIL % (test code = EO%) %          0.0-5.0         

      

 

                      BASOPHIL % (test code = BA%) %          0.0-1.0           

    

 

                                                    NUCLEATED RBC % (test code =

 

NRBC%)          %               0.0-0.9                         

 

                      NEUTROPHIL # (test code = NT#) x10 3/uL   1.6-7.2         

      

 

                      LYMPHOCYTE # (test code = LY#) x10 3/uL   1.1-2.7         

      

 

                      MONOCYTE # (test code = MO#) x10 3/uL   0.3-0.8           

    

 

                      EOSINOPHIL # (test code = EO#) x10 3/uL   0.0-0.5         

      





CULTURE, [id number] 00:00:00* 



                      Test Item  Value      Reference Range Interpretation Comme

nts

 

                                                    CULTURE, URINE (test 

code = 02726)                           SPECIMEN NUMBER: 

[id number]                                                   





Melvin BenitezCULTURE, [id number] 00:00:00* 



                      Test Item  Value      Reference Range Interpretation Comme

nts

 

                                                    CULTURE, URINE (test 

code = 26518)                           SPECIMEN NUMBER: 

[id number]                                                   





Melvin BenitezCULTURE, [id number] 00:00:00* 



                      Test Item  Value      Reference Range Interpretation Comme

nts

 

                                                    CULTURE, URINE (test 

code = 45791)                           SPECIMEN NUMBER: 

[id number]                                                   





Melvin BenitezCULTURE, [id number] 00:00:00* 



                      Test Item  Value      Reference Range Interpretation Comme

nts

 

                                                    CULTURE, URINE (test 

code = 18317)                           SPECIMEN NUMBER: 

[id number]                                                   





CULTURE, [id number] 00:00:00* 



                      Test Item  Value      Reference Range Interpretation Comme

nts

 

                                                    CULTURE, URINE (test 

code = 42090)                           SPECIMEN NUMBER: 

[id number]                                                   





DRUG ABUSE PANEL 10 WITH [id number] 00:00:00* 



                      Test Item  Value      Reference Range Interpretation Comme

nts

 

                      AMPHETAMINES (test code = 3201) NEGATIVE                  

       

 

                      BARBITURATES (test code = 3202) NEGATIVE                  

       

 

                      BENZODIAZEPINES (test code = 3203) NEGATIVE               

          

 

                      CANNABINOIDS (test code = 3204) NEGATIVE                  

       

 

                                                    COCAINE METABOLITE (test cod

e = 

3205)           NEGATIVE                                        

 

                      OPIATES (test code = 3209) NEGATIVE                       

  

 

                      OXYCODONE (test code = 82847) NEGATIVE                    

     

 

                      PHENCYCLIDINE (test code = 3210) NEGATIVE                 

        

 

                      METHADONE (test code = 3207) NEGATIVE                     

    

 

                      BUPRENORPHINE (test code = 53498) NEGATIVE                

         





Melvin TRACE AustinDRUG ABUSE PANEL 10 WITH [id number] 00:00:00* 



                      Test Item  Value      Reference Range Interpretation Comme

nts

 

                      AMPHETAMINES (test code = 3201) NEGATIVE                  

       

 

                      BARBITURATES (test code = 3202) NEGATIVE                  

       

 

                      BENZODIAZEPINES (test code = 3203) NEGATIVE               

          

 

                      CANNABINOIDS (test code = 3204) NEGATIVE                  

       

 

                                                    COCAINE METABOLITE (test cod

e = 

3205)           NEGATIVE                                        

 

                      OPIATES (test code = 3209) NEGATIVE                       

  

 

                      OXYCODONE (test code = 27491) NEGATIVE                    

     

 

                      PHENCYCLIDINE (test code = 3210) NEGATIVE                 

        

 

                      METHADONE (test code = 3207) NEGATIVE                     

    

 

                      BUPRENORPHINE (test code = 17281) NEGATIVE                

         





Melvin TRACE AustinDRUG ABUSE PANEL 10 WITH [id number] 00:00:00* 



                      Test Item  Value      Reference Range Interpretation Comme

nts

 

                      AMPHETAMINES (test code = 3201) NEGATIVE                  

       

 

                      BARBITURATES (test code = 3202) NEGATIVE                  

       

 

                      BENZODIAZEPINES (test code = 3203) NEGATIVE               

          

 

                      CANNABINOIDS (test code = 3204) NEGATIVE                  

       

 

                                                    COCAINE METABOLITE (test cod

e = 

3205)           NEGATIVE                                        

 

                      OPIATES (test code = 3209) NEGATIVE                       

  

 

                      OXYCODONE (test code = 58422) NEGATIVE                    

     

 

                      PHENCYCLIDINE (test code = 3210) NEGATIVE                 

        

 

                      METHADONE (test code = 3207) NEGATIVE                     

    

 

                      BUPRENORPHINE (test code = 41786) NEGATIVE                

         





Melvin F AustinDRUG ABUSE PANEL 10 WITH [id number] 00:00:00* 



                      Test Item  Value      Reference Range Interpretation Comme

nts

 

                      AMPHETAMINES (test code = 3201) NEGATIVE                  

       

 

                      BARBITURATES (test code = 3202) NEGATIVE                  

       

 

                      BENZODIAZEPINES (test code = 3203) NEGATIVE               

          

 

                      CANNABINOIDS (test code = 3204) NEGATIVE                  

       

 

                                                    COCAINE METABOLITE (test cod

e = 

3205)           NEGATIVE                                        

 

                      OPIATES (test code = 3209) NEGATIVE                       

  

 

                      OXYCODONE (test code = 01475) NEGATIVE                    

     

 

                      PHENCYCLIDINE (test code = 3210) NEGATIVE                 

        

 

                      METHADONE (test code = 3207) NEGATIVE                     

    

 

                      BUPRENORPHINE (test code = 77910) NEGATIVE                

         





DRUG ABUSE PANEL 10 WITH [id number] 00:00:00* 



                      Test Item  Value      Reference Range Interpretation Comme

nts

 

                      AMPHETAMINES (test code = 3201) NEGATIVE                  

       

 

                      BARBITURATES (test code = 3202) NEGATIVE                  

       

 

                      BENZODIAZEPINES (test code = 3203) NEGATIVE               

          

 

                      CANNABINOIDS (test code = 3204) NEGATIVE                  

       

 

                                                    COCAINE METABOLITE (test cod

e = 

3205)           NEGATIVE                                        

 

                      OPIATES (test code = 3209) NEGATIVE                       

  

 

                      OXYCODONE (test code = 17516) NEGATIVE                    

     

 

                      PHENCYCLIDINE (test code = 3210) NEGATIVE                 

        

 

                      METHADONE (test code = 3207) NEGATIVE                     

    

 

                      BUPRENORPHINE (test code = 30738) NEGATIVE                

         





VAGINAL PATHOGENS DNA [id number] 00:00:00* 



                      Test Item  Value      Reference Range Interpretation Comme

nts

 

                      TETO SPECIES (test code = 96426) NEGATIVE              

           

 

                      G. VAGINALIS (test code = 33801) POSITIVE                 

        

 

                      T. VAGINALIS (test code = 23378) NEGATIVE                 

        





Melvin F AustinVAGINAL PATHOGENS DNA [id number] 00:00:00* 



                      Test Item  Value      Reference Range Interpretation Comme

nts

 

                      TETO SPECIES (test code = 91077) NEGATIVE              

           

 

                      G. VAGINALIS (test code = 23292) POSITIVE                 

        

 

                      T. VAGINALIS (test code = ) NEGATIVE                 

        





Melvin WOODWARD AustinVAGINAL PATHOGENS DNA [id number] 00:00:00* 



                      Test Item  Value      Reference Range Interpretation Comme

nts

 

                      TETO SPECIES (test code = ) NEGATIVE              

           

 

                      G. VAGINALIS (test code = 95071) POSITIVE                 

        

 

                      T. VAGINALIS (test code = 30113) NEGATIVE                 

        





Melvin WOODWARD AustinVAGINAL PATHOGENS DNA [id number] 00:00:00* 



                      Test Item  Value      Reference Range Interpretation Comme

nts

 

                      TETO SPECIES (test code = 95425) NEGATIVE              

           

 

                      G. VAGINALIS (test code = 85389) POSITIVE                 

        

 

                      T. VAGINALIS (test code = 13660) NEGATIVE                 

        





VAGINAL PATHOGENS DNA [id number] 00:00:00* 



                      Test Item  Value      Reference Range Interpretation Comme

nts

 

                      TETO SPECIES (test code = ) NEGATIVE              

           

 

                      G. VAGINALIS (test code = 14233) POSITIVE                 

        

 

                      T. VAGINALIS (test code = 42704) NEGATIVE                 

        





GC AND CHLAMYDIA, AMPLIFIED, URINE2020-07-15 00:00:00* 



                      Test Item  Value      Reference Range Interpretation Comme

nts

 

                      GONORRHEA, TMA (test code = 83661) NEGATIVE               

          

 

                      CHLAMYDIA, TMA (test code = 44639) NEGATIVE               

          





Melvin BenitezHPV HIGH RISK WITH GENOTYPE, TP2020-07-15 00:00:00* 



                      Test Item  Value      Reference Range Interpretation Comme

nts

 

                                                    HPV HIGH RISK INTERP (test c

ode = 

04256)          NEGATIVE                                        

 

                      HPV 16 (test code = 93687) NEGATIVE                       

  

 

                      HPV 18 (test code = 09307) NEGATIVE                       

  

 

                                                    HPV, HR, OTHER GENOTYPES (te

st code 

= 75914)        NEGATIVE                                        





Melvin BenitezHIV AB/AG COMBO RFLX CONF2020-07-15 00:00:00* 



                      Test Item  Value      Reference Range Interpretation Comme

nts

 

                                                    HIV 1/2 4TH GEN, RFLX CONF (

test 

code = 3514)    NON-REACTIVE                                    





Melvin BenitezACUTE HEPATITIS PROFILE2020-07-15 00:00:00* 



                      Test Item  Value      Reference Range Interpretation Comme

nts

 

                                                    HEPATITIS A IgM (test code =

 

75046)          NON-REACTIVE                                    

 

                                                    HEPATITIS B CORE IgM (test c

ode 

= 4644)         NON-REACTIVE                                    

 

                                                    HEPATITIS B SURF AG (test co

de = 

0439)           NON-REACTIVE                                    

 

                                                    HEPATITIS C ANTIBODY (test c

ode 

= 8524)         NON-REACTIVE                                    

 

                                                    INTERPRETATION HEPATITIS A: 

(test code = 2552) (NOTE)                                          

 

                                                    INTERPRETATION HEPATITIS B: 

(test code = 40697) (NOTE)                                          

 

                                                    INTERPRETATION HEPATITIS C: 

(test code = 35258) (NOTE)                                          





Melvin [id number] 00:00:00* 



                      Test Item  Value      Reference Range Interpretation Comme

nts

 

                                                    RPR RESULT (test code = 

3501)           NON-REACTIVE                                    

 

                      RPR TITER (test code = 3500) NOT INDIC. TITER             

          





Melvin BenitezPAP TEST, THINPREP, IMAGED2020-07-15 00:00:00* 



                      Test Item  Value      Reference Range Interpretation Comme

nts

 

                                                    SOURCE: (test code = 

8001)           Cervical/Endocervical                                 

 

                                                    SLIDES: (test code = 

8011)           1                                               

 

                      LMP: (test code = 8021) 2020                          

 

                                                    SPECIMEN ADEQUACY: (test 

code = 44565)   (NOTE)                                          

 

                                                    INTERPRETATION: (test 

code = 58090)                           NILM/NO EPITH. 

ABNORMALITY;SEE BELOW                                         

 

                                                    CYTOTECHNOLOGIST: (test 

code = 8101)                            LYNN Horton 

(ASCP)                                                      

 

                                                    LOCATION: (test code = 

74123)          (NOTE)                                          

 

                      CPT: (test code = 8140) (NOTE)                           





Melvin BenitezGC AND CHLAMYDIA, AMPLIFIED, URINE2020-07-15 00:00:00* 



                      Test Item  Value      Reference Range Interpretation Comme

nts

 

                      GONORRHEA, TMA (test code = 88000) NEGATIVE               

          

 

                      CHLAMYDIA, TMA (test code = 77280) NEGATIVE               

          





Melvin BenitezHPV HIGH RISK WITH GENOTYPE, TP2020-07-15 00:00:00* 



                      Test Item  Value      Reference Range Interpretation Comme

nts

 

                                                    HPV HIGH RISK INTERP (test c

ode = 

43869)          NEGATIVE                                        

 

                      HPV 16 (test code = 61220) NEGATIVE                       

  

 

                      HPV 18 (test code = 85157) NEGATIVE                       

  

 

                                                    HPV, HR, OTHER GENOTYPES (te

st code 

= 06084)        NEGATIVE                                        





Melvin BenitezHIV AB/AG COMBO RFLX CONF2020-07-15 00:00:00* 



                      Test Item  Value      Reference Range Interpretation Comme

nts

 

                                                    HIV 1/2 4TH GEN, RFLX CONF (

test 

code = 3514)    NON-REACTIVE                                    





Melvin BenitezACUTE HEPATITIS PROFILE2020-07-15 00:00:00* 



                      Test Item  Value      Reference Range Interpretation Comme

nts

 

                                                    HEPATITIS A IgM (test code =

 

66889)          NON-REACTIVE                                    

 

                                                    HEPATITIS B CORE IgM (test c

ode 

= 5570)         NON-REACTIVE                                    

 

                                                    HEPATITIS B SURF AG (test co

de = 

3143)           NON-REACTIVE                                    

 

                                                    HEPATITIS C ANTIBODY (test c

ode 

= 4682)         NON-REACTIVE                                    

 

                                                    INTERPRETATION HEPATITIS A: 

(test code = 2552) (NOTE)                                          

 

                                                    INTERPRETATION HEPATITIS B: 

(test code = 14206) (NOTE)                                          

 

                                                    INTERPRETATION HEPATITIS C: 

(test code = 24122) (NOTE)                                          





Melvin [id number] 00:00:00* 



                      Test Item  Value      Reference Range Interpretation Comme

nts

 

                                                    RPR RESULT (test code = 

3501)           NON-REACTIVE                                    

 

                      RPR TITER (test code = 3500) NOT INDIC. TITER             

          





Melvin BenitezPAP TEST, THINPREP, IMAGED2020-07-15 00:00:00* 



                      Test Item  Value      Reference Range Interpretation Comme

nts

 

                                                    SOURCE: (test code = 

8001)           Cervical/Endocervical                                 

 

                                                    SLIDES: (test code = 

8011)           1                                               

 

                      LMP: (test code = 8021) 2020                          

 

                                                    SPECIMEN ADEQUACY: (test 

code = 65013)   (NOTE)                                          

 

                                                    INTERPRETATION: (test 

code = 62256)                           NILM/NO EPITH. 

ABNORMALITY;SEE BELOW                                         

 

                                                    CYTOTECHNOLOGIST: (test 

code = 8101)                            LYNN Horton 

(ASCP)                                                      

 

                                                    LOCATION: (test code = 

46960)          (NOTE)                                          

 

                      CPT: (test code = 8140) (NOTE)                           





Melvin BenitezGC AND CHLAMYDIA, AMPLIFIED, URINE2020-07-15 00:00:00* 



                      Test Item  Value      Reference Range Interpretation Comme

nts

 

                      GONORRHEA, TMA (test code = 59326) NEGATIVE               

          

 

                      CHLAMYDIA, TMA (test code = 83492) NEGATIVE               

          





Melvin BenitezHPV HIGH RISK WITH GENOTYPE, TP2020-07-15 00:00:00* 



                      Test Item  Value      Reference Range Interpretation Comme

william

 

                                                    HPV HIGH RISK INTERP (test c

ode = 

23323)          NEGATIVE                                        

 

                      HPV 16 (test code = 10572) NEGATIVE                       

  

 

                      HPV 18 (test code = 67666) NEGATIVE                       

  

 

                                                    HPV, HR, OTHER GENOTYPES (te

st code 

= 28476)        NEGATIVE                                        





Melvin BenitezHIV AB/AG COMBO RFLX CONF2020-07-15 00:00:00* 



                      Test Item  Value      Reference Range Interpretation Comme

nts

 

                                                    HIV 1/2 4TH GEN, RFLX CONF (

test 

code = 3514)    NON-REACTIVE                                    





Melvin BenitezACUTE HEPATITIS PROFILE2020-07-15 00:00:00* 



                      Test Item  Value      Reference Range Interpretation Comme

nts

 

                                                    HEPATITIS A IgM (test code =

 

53886)          NON-REACTIVE                                    

 

                                                    HEPATITIS B CORE IgM (test c

ode 

= 4644)         NON-REACTIVE                                    

 

                                                    HEPATITIS B SURF AG (test co

de = 

2739)           NON-REACTIVE                                    

 

                                                    HEPATITIS C ANTIBODY (test c

ode 

= 4662)         NON-REACTIVE                                    

 

                                                    INTERPRETATION HEPATITIS A: 

(test code = 2552) (NOTE)                                          

 

                                                    INTERPRETATION HEPATITIS B: 

(test code = 27337) (NOTE)                                          

 

                                                    INTERPRETATION HEPATITIS C: 

(test code = 09817) (NOTE)                                          





Melvin [id number] 00:00:00* 



                      Test Item  Value      Reference Range Interpretation Comme

nts

 

                                                    RPR RESULT (test code = 

3501)           NON-REACTIVE                                    

 

                      RPR TITER (test code = 3500) NOT INDIC. TITER             

          





Melvin BenitezPAP TEST, THINPREP, IMAGED2020-07-15 00:00:00* 



                      Test Item  Value      Reference Range Interpretation Comme

nts

 

                                                    SOURCE: (test code = 

8001)           Cervical/Endocervical                                 

 

                                                    SLIDES: (test code = 

8011)           1                                               

 

                      LMP: (test code = 8021) 2020                          

 

                                                    SPECIMEN ADEQUACY: (test 

code = 28267)   (NOTE)                                          

 

                                                    INTERPRETATION: (test 

code = 68751)                           NILM/NO EPITH. 

ABNORMALITY;SEE BELOW                                         

 

                                                    CYTOTECHNOLOGIST: (test 

code = 8101)                            LYNN Horton 

(ASCP)                                                      

 

                                                    LOCATION: (test code = 

50052)          (NOTE)                                          

 

                      CPT: (test code = 8140) (NOTE)                           





Melvin BenitezPAP TEST, THINPREP, IMAGED2020-07-15 00:00:00* 



                      Test Item  Value      Reference Range Interpretation Comme

nts

 

                                                    SOURCE: (test code = 

8001)           Cervical/Endocervical                                 

 

                                                    SLIDES: (test code = 

8011)           1                                               

 

                      LMP: (test code = 8021) 2020                          

 

                                                    SPECIMEN ADEQUACY: (test 

code = 16121)   (NOTE)                                          

 

                                                    INTERPRETATION: (test 

code = 70733)                           NILM/NO EPITH. 

ABNORMALITY;SEE BELOW                                         

 

                                                    CYTOTECHNOLOGIST: (test 

code = 8101)                            LYNN Horton 

(ASCP)                                                      

 

                                                    LOCATION: (test code = 

92171)          (NOTE)                                          

 

                      CPT: (test code = 8140) (NOTE)                           





HPV HIGH RISK WITH GENOTYPE, TP2020-07-15 00:00:00* 



                      Test Item  Value      Reference Range Interpretation Comme

nts

 

                                                    HPV HIGH RISK INTERP (test c

ode = 

41736)          NEGATIVE                                        

 

                      HPV 16 (test code = 13698) NEGATIVE                       

  

 

                      HPV 18 (test code = 94584) NEGATIVE                       

  

 

                                                    HPV, HR, OTHER GENOTYPES (te

st code 

= 05540)        NEGATIVE                                        





GC AND CHLAMYDIA, AMPLIFIED, URINE2020-07-15 00:00:00* 



                      Test Item  Value      Reference Range Interpretation Comme

nts

 

                      GONORRHEA, TMA (test code = 42479) NEGATIVE               

          

 

                      CHLAMYDIA, TMA (test code = 31311) NEGATIVE               

          





HIV AB/AG COMBO RFLX CONF2020-07-15 00:00:00* 



                      Test Item  Value      Reference Range Interpretation Comme

nts

 

                                                    HIV 1/2 4TH GEN, RFLX CONF (

test 

code = 3514)    NON-REACTIVE                                    





ACUTE HEPATITIS PROFILE2020-07-15 00:00:00* 



                      Test Item  Value      Reference Range Interpretation Comme

nts

 

                                                    HEPATITIS A IgM (test code =

 

28509)          NON-REACTIVE                                    

 

                                                    HEPATITIS B CORE IgM (test c

ode 

= 4644)         NON-REACTIVE                                    

 

                                                    HEPATITIS B SURF AG (test co

de = 

2739)           NON-REACTIVE                                    

 

                                                    HEPATITIS C ANTIBODY (test c

ode 

= 4675)         NON-REACTIVE                                    

 

                                                    INTERPRETATION HEPATITIS A: 

(test code = 2552) (NOTE)                                          

 

                                                    INTERPRETATION HEPATITIS B: 

(test code = 31615) (NOTE)                                          

 

                                                    INTERPRETATION HEPATITIS C: 

(test code = 24320) (NOTE)                                          





RPR2020-07-15 00:00:00* 



                      Test Item  Value      Reference Range Interpretation Comme

nts

 

                                                    RPR RESULT (test code = 

3501)           NON-REACTIVE                                    

 

                      RPR TITER (test code = 3500) NOT INDIC. TITER             

          





PAP TEST, THINPREP, IMAGED2020-07-15 00:00:00* 



                      Test Item  Value      Reference Range Interpretation Comme

nts

 

                                                    SOURCE: (test code = 

8001)           Cervical/Endocervical                                 

 

                                                    SLIDES: (test code = 

8011)           1                                               

 

                      LMP: (test code = 8021) 2020                          

 

                                                    SPECIMEN ADEQUACY: (test 

code = 39618)   (NOTE)                                          

 

                                                    INTERPRETATION: (test 

code = 11002)                           NILM/NO EPITH. 

ABNORMALITY;SEE BELOW                                         

 

                                                    CYTOTECHNOLOGIST: (test 

code = 8101)                            LYNN Horton 

(ASCP)                                                      

 

                                                    LOCATION: (test code = 

67851)          (NOTE)                                          

 

                      CPT: (test code = 8140) (NOTE)                           





HPV HIGH RISK WITH GENOTYPE, TP2020-07-15 00:00:00* 



                      Test Item  Value      Reference Range Interpretation Comme

nts

 

                                                    HPV HIGH RISK INTERP (test c

ode = 

17282)          NEGATIVE                                        

 

                      HPV 16 (test code = 00104) NEGATIVE                       

  

 

                      HPV 18 (test code = 57622) NEGATIVE                       

  

 

                                                    HPV, HR, OTHER GENOTYPES (te

st code 

= 18177)        NEGATIVE                                        





GC AND CHLAMYDIA, AMPLIFIED, URINE2020-07-15 00:00:00* 



                      Test Item  Value      Reference Range Interpretation Comme

nts

 

                      GONORRHEA, TMA (test code = 07859) NEGATIVE               

          

 

                      CHLAMYDIA, TMA (test code = 09957) NEGATIVE               

          





HIV AB/AG COMBO RFLX CONF2020-07-15 00:00:00* 



                      Test Item  Value      Reference Range Interpretation Comme

nts

 

                                                    HIV 1/2 4TH GEN, RFLX CONF (

test 

code = 3514)    NON-REACTIVE                                    





ACUTE HEPATITIS PROFILE2020-07-15 00:00:00* 



                      Test Item  Value      Reference Range Interpretation Comme

nts

 

                                                    HEPATITIS A IgM (test code =

 

25127)          NON-REACTIVE                                    

 

                                                    HEPATITIS B CORE IgM (test c

ode 

= 4644)         NON-REACTIVE                                    

 

                                                    HEPATITIS B SURF AG (test co

de = 

2739)           NON-REACTIVE                                    

 

                                                    HEPATITIS C ANTIBODY (test c

ode 

= 4675)         NON-REACTIVE                                    

 

                                                    INTERPRETATION HEPATITIS A: 

(test code = 2552) (NOTE)                                          

 

                                                    INTERPRETATION HEPATITIS B: 

(test code = 08300) (NOTE)                                          

 

                                                    INTERPRETATION HEPATITIS C: 

(test code = 24182) (NOTE)                                          





RPR2020-07-15 00:00:00* 



                      Test Item  Value      Reference Range Interpretation Comme

nts

 

                                                    RPR RESULT (test code = 

3501)           NON-REACTIVE                                    

 

                      RPR TITER (test code = 3500) NOT INDIC. TITER

## 2025-01-31 NOTE — EDPHYS
Physician Documentation                                                                           

 [facility]                                                                 

Name: Joi Duncan                                                                                

Age: 34 yrs                                                                                       

Sex: Female                                                                                       

: 1990                                                                                   

MRN: [id number]                                                                                   

Arrival Date: 2025                                                                          

Time: 08:06                                                                                       

Account#: [id number]                                                                            

Bed 14                                                                                            

Private MD:                                                                                       

ED Physician Adrian Webster                                                                         

HPI:                                                                                              

                                                                                             

08:30 This 34 yrs old Black Female presents to ER via Ambulatory with complaints of           ms3 

      Vomiting/Diarrhea, Flu Symptoms, Cough.                                                     

08:30 Joi Duncan is a 34-year-old female who presents to the emergency department   ms3 

      with symptoms of vomiting, diarrhea, flu-like symptoms, cough, watery eyes, sore            

      throat, and body aches. She reports intermittent fevers and chills. Her pain level is a     

      7 out of 10, mainly characterized by aching. She has no known past medical problems..       

                                                                                                  

Historical:                                                                                       

- Allergies:                                                                                      

08:19 No Known Allergies;                                                                     hb  

- Home Meds:                                                                                      

08:19 None [Active];                                                                          hb  

- PMHx:                                                                                           

08:19 gestational diabetes;                                                                   hb  

- PSHx:                                                                                           

08:19 None;                                                                                   hb  

                                                                                                  

- Immunization history:: Adult Immunizations up to date.                                          

- Infectious Disease History:: Denies.                                                            

- Social history:: Smoking status: Patient denies any tobacco usage or history of.                

                                                                                                  

                                                                                                  

ROS:                                                                                              

08:30 Cardiovascular: Negative for chest pain, and palpitations. MS/Extremity: Negative for   ms3 

      injury and deformity, Skin: Negative for injury, rash, and discoloration,                   

08:30 Constitutional: Positive for body aches, chills, fever,                                     

08:30 Respiratory: Positive for cough,                                                            

08:30 Abdomen/GI: Positive for nausea, vomiting, diarrhea,                                        

                                                                                                  

Exam:                                                                                             

08:30 Constitutional:  This is a well developed, well nourished patient who is awake, alert,  ms3 

      and in no acute distress. Cardiovascular:  Regular rate and rhythm with a normal S1 and     

      S2.  No gallops, murmurs, or rubs.  Normal PMI, no JVD.  No pulse deficits.                 

      Respiratory:  Lungs have equal breath sounds bilaterally, clear to auscultation and         

      percussion.  No rales, rhonchi or wheezes noted.  No increased work of breathing, no        

      retractions or nasal flaring. Abdomen/GI:  Soft, non-tender, with normal bowel sounds.      

      No distension or tympany.  No guarding or rebound.  No evidence of tenderness               

      throughout.                                                                                 

                                                                                                  

Vital Signs:                                                                                      

08:18  / 96; Pulse 92; Resp 16; Temp 98.2(O); Pulse Ox 100% on R/A; Weight 95.25 kg;    hb  

      Height 4 ft. 11 in. ; Pain 7/10;                                                            

08:18 Body Mass Index 42.41 (95.25 kg, 149.86 cm)                                             hb  

08:18 Pain Scale: Adult                                                                       hb  

                                                                                                  

MDM:                                                                                              

08:30 Medical Screening Exam initiated                                                        ms3 

08:30 Differential diagnosis: Nonspecific abd pain, viral gastroenteritis, gastroenteritis,   ms3 

      Flu vs COVID.                                                                               

09:57 Data reviewed: vital signs, nurses notes, lab test result(s), and as a result, I will   ms3 

      discharge patient. I considered the following discharge prescriptions or medication         

      management in the emergency department Medications were administered in the Emergency       

      Department. See MAR. Counseling: I had a detailed discussion with the patient and/or        

      guardian regarding the historical points, exam findings, and any diagnostic results         

      supporting the discharge/admit diagnosis, lab results, the need for outpatient follow       

      up, to return to the emergency department if symptoms worsen or persist or if there are     

      any questions or concerns that arise at home. Response to treatment: the patient's          

      symptoms have markedly improved after treatment, and as a result, I will discharge          

      patient. Special discussion: I discussed with the patient/guardian in detail that at        

      this point there is no indication for admission to the hospital. It is understood,          

      however, that if the symptoms persist or worsen the patient needs to return immediately     

      for re-evaluation. ED course: Discussed negative flu, COVID results with patient.           

      Patient's symptoms improved after ODT Zofran. Patient to follow-up with Dr. Bhandari in 2      

      to 3 days. Patient understands and agrees with plan. All questions were answered.           

      Return precautions discussed include inability to tolerate p.o., shortness of breath,       

      lightheadedness, worsening symptoms, or any other concerns..                                

                                                                                                  

                                                                                             

08:12 Order name: Flu; Complete Time: 09:51                                                   ms3 

                                                                                             

08:12 Order name: SARS RAPID; Complete Time: 09:51                                            ms3 

                                                                                                  

Administered Medications:                                                                         

08:25 Drug: Ondansetron PO 4 mg PO once Route: PO;                                            kc6 

09:29 Follow up: Response: No adverse reaction                                                kc6 

                                                                                                  

                                                                                                  

Disposition Summary:                                                                              

25 09:55                                                                                    

Discharge Ordered                                                                                 

 Notes:       Location: Home                                                                        
  ms3

      Condition: Stable                                                                       ms3 

      Diagnosis                                                                                   

        - Other viral infections of unspecified site                                          ms3 

        - Nausea with vomiting, unspecified                                                   ms3 

        - Diarrhea, unspecified                                                               ms3 

      Followup:                                                                               ms3 

        - With: Bernabe Bhandari DO                                                                  

        - When: 2 - 3 days                                                                         

        - Reason: Recheck today's complaints                                                       

      Discharge Instructions:                                                                     

        - Discharge Summary Sheet                                                             ms3 

        - Food Choices to Help Relieve Diarrhea, Adult                                        ms3 

        - Diarrhea, Adult                                                                     ms3 

        - Nausea and Vomiting, Adult                                                          ms3 

      Forms:                                                                                      

        - Medication Reconciliation Form                                                      ms3 

        - Antibiotic Education                                                                ms3 

        - Prescription Opioid Use                                                             ms3 

        - Patient Portal Instructions                                                         ms3 

        - Leadership Thank You Letter                                                         ms3 

        - Work release form                                                                   kc6 

      Prescriptions:                                                                              

        - ondansetron 4 mg Oral Tablet,disintegrating                                              

            - take 1 tablet ORAL route every 8 hours; 10 tablet; Refills: 0, Product          ms3 

      Selection Permitted                                                                         

Signatures:                                                                                       

Dispatcher [employer]                           Isa Wagner RN RN                                                      

Adrian Webster DO                        DO   ms3                                                  

Rama Shook RN                   RN   kc6                                                  

                                                                                                  

**************************************************************************************************

## 2025-01-31 NOTE — ER
Nurse's Notes                                                                                     

 [facility]                                                                 

Name: Joi Duncan                                                                                

Age: 34 yrs                                                                                       

Sex: Female                                                                                       

: 1990                                                                                   

MRN: [id number]                                                                                   

Arrival Date: 2025                                                                          

Time: 08:06                                                                                       

Account#: [id number]                                                                            

Bed 14                                                                                            

Private MD:                                                                                       

Diagnosis: Other viral infections of unspecified site;Nausea with vomiting, unspecified;Diarrhea, 

  unspecified                                                                                     

                                                                                                  

Presentation:                                                                                     

                                                                                             

08:18 Chief complaint: Headache, cough, congestion, sore throat, body aches N/V/D, and chills hb  

      x 2 days. Coronavirus screen: Client presents with at least one sign or symptom that        

      may indicate coronavirus-19. Standard/surgical mask placed on the client. Provider          

      contacted for isolation considerations. Ebola Screen: No symptoms or risks identified       

      at this time. Initial Sepsis Screen: Does the patient meet any 2 criteria? No.              

      Patient's initial sepsis screen is negative. Does the patient have a suspected source       

      of infection? No. Patient's initial sepsis screen is negative. Risk Assessment: Do you      

      want to hurt yourself or someone else? Patient reports no desire to harm self or            

      others. Onset of symptoms was 2025.                                             

08:18 Method Of Arrival: Ambulatory                                                           hb  

08:18 Acuity: DIEGO 4                                                                           hb  

                                                                                                  

Historical:                                                                                       

- Allergies:                                                                                      

08:19 No Known Allergies;                                                                     hb  

- Home Meds:                                                                                      

08:19 None [Active];                                                                          hb  

- PMHx:                                                                                           

08:19 gestational diabetes;                                                                   hb  

- PSHx:                                                                                           

08:19 None;                                                                                   hb  

                                                                                                  

- Immunization history:: Adult Immunizations up to date.                                          

- Infectious Disease History:: Denies.                                                            

- Social history:: Smoking status: Patient denies any tobacco usage or history of.                

                                                                                                  

                                                                                                  

Screenin:25 [facility] ED Fall Risk Assessment (Adult) History of falling in the last 3 months,       kc6 

      including since admission No falls in past 3 months (0 pts) Confusion or Disorientation     

      No (0 pts) Intoxicated or Sedated No (0 pts) Impaired Gait No (0 pts) Mobility Assist       

      Device Used No (0 pt) Altered Elimination No (0 pt) Score/Fall Risk Level 0 - 2 = Low       

      Risk Oriented to surroundings, Maintained a safe environment. Abuse screen: Denies          

      threats or abuse. Denies injuries from another. Nutritional screening: No deficits          

      noted. Tuberculosis screening: No symptoms or risk factors identified.                      

                                                                                                  

Assessment:                                                                                       

08:26 General: Appears in no apparent distress. uncomfortable, well groomed, well developed,  kc6 

      Behavior is calm, cooperative, appropriate for age, Reports chills for 1-2 days, fever      

      for 1-2 days, feeling ill for 1-2 days. Neuro: Level of Consciousness is awake, alert,      

      obeys commands, Oriented to person, place, time, situation, Appropriate for age Reports     

      headache. Cardiovascular: Capillary refill < 3 seconds. Respiratory: Reports cough that     

      is productive, Airway is patent Trachea midline Respiratory effort is even, unlabored,      

      Respiratory pattern is regular, symmetrical. GI: Abdomen is flat, non-distended, Abd is     

      soft and non tender X 4 quads. Reports diarrhea, nausea, vomiting, Patient currently        

      denies abdominal pain. : No signs and/or symptoms were reported regarding the             

      genitourinary system. EENT: Reports nasal congestion pain when swallowing. Derm: No         

      signs and/or symptoms reported regarding the dermatologic system. Skin is intact, is        

      healthy with good turgor, Skin is pink, warm \T\ dry. Musculoskeletal: No signs and/or      

      symptoms reported regarding the musculoskeletal system. Circulation, motion, and            

      sensation intact. Range of motion: intact in all extremities.                               

09:29 Reassessment: Patient appears in no apparent distress at this time. No changes from     kc6 

      previously documented assessment. Patient and/or family updated on plan of care and         

      expected duration. Pain level reassessed. Patient is alert, oriented x 3, equal             

      unlabored respirations, skin warm/dry/pink.                                                 

                                                                                                  

Vital Signs:                                                                                      

08:18  / 96; Pulse 92; Resp 16; Temp 98.2(O); Pulse Ox 100% on R/A; Weight 95.25 kg;    hb  

      Height 4 ft. 11 in. ; Pain 7/10;                                                            

08:18 Body Mass Index 42.41 (95.25 kg, 149.86 cm)                                             hb  

08:18 Pain Scale: Adult                                                                       hb  

                                                                                                  

ED Course:                                                                                        

08:09 Patient arrived in ED.                                                                  sj2 

08:12 Adrian Webster DO is Attending Physician.                                                ms3 

08:17 Rama Shook, RN is Primary Nurse.                                                 kc6 

08:19 Triage completed.                                                                       hb  

08:20 Arm band placed on.                                                                     hb  

08:25 Patient has correct armband on for positive identification. Bed in low position. Call   kc6 

      light in reach. Side rails up X 1. Pulse ox on. NIBP on. Door closed. Noise minimized.      

      Lights dimmed. Pillow given.                                                                

08:26 Patient maintains SpO2 saturation greater than 95% on room air.                         kc6 

09:54 Bernabe Bhandari DO is Referral Physician.                                                ms3 

10:02 No provider procedures requiring assistance completed. Patient did not have IV access   kc6 

      during this emergency room visit.                                                           

                                                                                                  

Administered Medications:                                                                         

08:25 Drug: Ondansetron PO 4 mg PO once Route: PO;                                            kc6 

09:29 Follow up: Response: No adverse reaction                                                kc6 

                                                                                                  

                                                                                                  

Medication:                                                                                       

10:03 VIS not applicable for this client.                                                     kc6 

                                                                                                  

Outcome:                                                                                          

09:55 Discharge ordered by MD.                                                                ms3 

10:02 Discharged to home ambulatory,                                                          kc6 

10:02 Condition: good                                                                             

10:02 Discharge instructions given to patient, Instructed on discharge instructions, follow       

      up and referral plans. medication usage, Demonstrated understanding of instructions,        

      follow-up care, medications, Prescriptions given X 1,                                       

10:03 Patient left the ED.                                                                    kc6 

                                                                                                  

Signatures:                                                                                       

Isa Monterroso RN RN                                                      

Adrian Webster DO DO   ms3                                                  

Rama Shook RN                   RN   kc6                                                  

Tess Summers                           2                                                  

                                                                                                  

**************************************************************************************************

## 2025-03-09 ENCOUNTER — HOSPITAL ENCOUNTER (EMERGENCY)
Dept: HOSPITAL 97 - ER | Age: 35
Discharge: HOME | End: 2025-03-09
Payer: SELF-PAY

## 2025-03-09 VITALS — DIASTOLIC BLOOD PRESSURE: 60 MMHG | SYSTOLIC BLOOD PRESSURE: 136 MMHG | TEMPERATURE: 98 F

## 2025-03-09 VITALS — OXYGEN SATURATION: 100 %

## 2025-03-09 DIAGNOSIS — D72.829: ICD-10-CM

## 2025-03-09 DIAGNOSIS — S29.092A: Primary | ICD-10-CM

## 2025-03-09 LAB
ALBUMIN SERPL BCP-MCNC: 3.3 G/DL (ref 3.4–5)
ALBUMIN/GLOB SERPL: 1.1 {RATIO} (ref 1.1–1.8)
ALP SERPL-CCNC: 78 U/L (ref 45–117)
ALT SERPL W P-5'-P-CCNC: 20 U/L (ref 13–56)
ANION GAP SERPL CALC-SCNC: 8.3 MEQ/L (ref 5–15)
AST SERPL W P-5'-P-CCNC: 16 U/L (ref 15–37)
BUN BLD-MCNC: 7 MG/DL (ref 7–18)
GLOBULIN SER CALC-MCNC: 2.9 G/DL (ref 2.3–3.5)
GLUCOSE SERPLBLD-MCNC: 83 MG/DL (ref 74–106)
HCT VFR BLD CALC: 42.5 % (ref 36–45)
HGB BLD-MCNC: 14.8 G/DL (ref 12–15)
LYMPHOCYTES # SPEC AUTO: 3.7 K/UL (ref 0.7–4.9)
MCH RBC QN AUTO: 32.5 PG (ref 27–35)
MCHC RBC AUTO-ENTMCNC: 34.7 G/DL (ref 32–36)
MCV RBC: 93.7 FL (ref 80–100)
NRBC # BLD: 0 10*3/UL (ref 0–0)
NRBC BLD AUTO-RTO: 0 % (ref 0–0)
PMV BLD: 8.4 FL (ref 7.6–11.3)
POTASSIUM SERPL-SCNC: 4.3 MEQ/L (ref 3.5–5.1)
RBC # BLD: 4.54 M/UL (ref 3.86–4.86)
TROPONIN I SERPL HS-MCNC: 4.1 PG/ML (ref ?–58.9)
UA DIPSTICK W REFLEX MICRO PNL UR: (no result)
WBC # BLD AUTO: 14.4 THOU/UL (ref 4.3–10.9)

## 2025-03-09 PROCEDURE — 99284 EMERGENCY DEPT VISIT MOD MDM: CPT

## 2025-03-09 PROCEDURE — 71275 CT ANGIOGRAPHY CHEST: CPT

## 2025-03-09 PROCEDURE — 81003 URINALYSIS AUTO W/O SCOPE: CPT

## 2025-03-09 PROCEDURE — 71046 X-RAY EXAM CHEST 2 VIEWS: CPT

## 2025-03-09 PROCEDURE — 96361 HYDRATE IV INFUSION ADD-ON: CPT

## 2025-03-09 PROCEDURE — 96375 TX/PRO/DX INJ NEW DRUG ADDON: CPT

## 2025-03-09 PROCEDURE — 93005 ELECTROCARDIOGRAM TRACING: CPT

## 2025-03-09 PROCEDURE — 81025 URINE PREGNANCY TEST: CPT

## 2025-03-09 PROCEDURE — 84484 ASSAY OF TROPONIN QUANT: CPT

## 2025-03-09 PROCEDURE — 80053 COMPREHEN METABOLIC PANEL: CPT

## 2025-03-09 PROCEDURE — 87428 SARSCOV & INF VIR A&B AG IA: CPT

## 2025-03-09 PROCEDURE — 36415 COLL VENOUS BLD VENIPUNCTURE: CPT

## 2025-03-09 PROCEDURE — 85025 COMPLETE CBC W/AUTO DIFF WBC: CPT

## 2025-03-09 PROCEDURE — 96374 THER/PROPH/DIAG INJ IV PUSH: CPT

## 2025-03-09 NOTE — EDPHYS
Physician Documentation                                                                           

 [facility]                                                                 

Name: Joi Duncan                                                                                

Age: 34 yrs                                                                                       

Sex: Female                                                                                       

: 1990                                                                                   

MRN: [id number]                                                                                   

Arrival Date: 2025                                                                          

Time: 12:31                                                                                       

Account#: [id number]                                                                            

Bed 14                                                                                            

Private MD:                                                                                       

ALISSA Physician Andrea Levin                                                                      

HPI:                                                                                              

                                                                                             

16:25 This 34 yrs old Black Female presents to ER via Ambulatory with complaints of lump on   angel 

      neck, Back Pain, Cough.                                                                     

16:25 The patient presents with pain that is acute, with no known mechanism of injury. The    angel 

      symptoms are located in the thoracic area. Onset: The symptoms/episode began/occurred 2     

      day(s) ago. The pain does not radiate. Associated signs and symptoms: The patient has       

      no apparent associated signs or symptoms. Modifying factors: The patient symptoms are       

      alleviated by remaining still, the patient symptoms are aggravated by any movement.         

      Severity of symptoms: At their worst the symptoms were moderate, in the emergency           

      department the symptoms are unchanged. The patient has not experienced similar symptoms     

      in the past.                                                                                

                                                                                                  

OB/GYN:                                                                                           

13:02 LMP 2025, Pregnancy unknown                                                        db  

                                                                                                  

Historical:                                                                                       

- Allergies:                                                                                      

13:01 No Known Allergies;                                                                     db  

- PMHx:                                                                                           

13:01 gestational diabetes;                                                                   db  

                                                                                                  

- Immunization history:: Adult Immunizations unknown.                                             

- Infectious Disease History:: Denies.                                                            

- Social history:: Smoking status: Patient denies any tobacco usage or history of.                

- Family history:: not pertinent.                                                                 

                                                                                                  

                                                                                                  

ROS:                                                                                              

16:25 Constitutional: Negative for fever, chills, and weight loss, Eyes: Negative for injury, angel 

      pain, redness, and discharge, ENT: Negative for injury, pain, and discharge, Neck:          

      Negative for injury, pain, and swelling, Cardiovascular: Negative for chest pain,           

      palpitations, and edema, Respiratory: Negative for shortness of breath, cough,              

      wheezing, and pleuritic chest pain, Abdomen/GI: Negative for abdominal pain, nausea,        

      vomiting, diarrhea, and constipation, : Negative for injury, bleeding, discharge, and     

      swelling, MS/Extremity: Negative for injury and deformity, Skin: Negative for injury,       

      rash, and discoloration, Neuro: Negative for headache, weakness, numbness, tingling,        

      and seizure, Psych: Negative for depression, anxiety, suicide ideation, homicidal           

      ideation, and hallucinations, Allergy/Immunology: Negative for hives, rash, and             

      allergies, Endocrine: Negative for neck swelling, polydipsia, polyuria, polyphagia, and     

      marked weight changes, Hematologic/Lymphatic: Negative for swollen nodes, abnormal          

      bleeding, and unusual bruising,                                                             

16:25 Back: Positive for of the thoracic area,                                                    

                                                                                                  

Exam:                                                                                             

16:25 Constitutional:  This is a well developed, well nourished patient who is awake, alert,  angel 

      and in no acute distress. Head/Face:  Normocephalic, atraumatic. Eyes:  Pupils equal        

      round and reactive to light, extra-ocular motions intact.  Lids and lashes normal.          

      Conjunctiva and sclera are non-icteric and not injected.  Cornea within normal limits.      

      Periorbital areas with no swelling, redness, or edema. ENT:  Nares patent. No nasal         

      discharge, no septal abnormalities noted.  Tympanic membranes are normal and external       

      auditory canals are clear.  Oropharynx with no redness, swelling, or masses, exudates,      

      or evidence of obstruction, uvula midline.  Mucous membranes moist. Neck:  Trachea          

      midline, no thyromegaly or masses palpated, and no cervical lymphadenopathy.  Supple,       

      full range of motion without nuchal rigidity, or vertebral point tenderness.  No            

      Meningismus. Chest/axilla:  Normal chest wall appearance and motion.  Nontender with no     

      deformity.  No lesions are appreciated. Cardiovascular:  Regular rate and rhythm with a     

      normal S1 and S2.  No gallops, murmurs, or rubs.  Normal PMI, no JVD.  No pulse             

      deficits. Abdomen/GI:  Soft, non-tender, with normal bowel sounds.  No distension or        

      tympany.  No guarding or rebound.  No evidence of tenderness throughout. Back:  No          

      spinal tenderness.  No costovertebral tenderness.  Full range of motion. Pelvic Exam:       

      Normal external genitalia.  Speculum exam with closed cervical os, no discharge or          

      bleeding noted.  Bimanual exam with normal adnexa, no adnexal or cervical motion            

      tenderness.  Normal uterus. Skin:  Warm, dry with normal turgor.  Normal color with no      

      rashes, no lesions, and no evidence of cellulitis. MS/ Extremity:  Pulses equal, no         

      cyanosis.  Neurovascular intact.  Full, normal range of motion., bilateral aka Neuro:       

      Awake and alert, GCS 15, oriented to person, place, time, and situation.  Cranial           

      nerves II-XII grossly intact.  Motor strength 5/5 in all extremities.  Sensory grossly      

      intact.  Cerebellar exam normal.  Normal gait.                                              

16:25 Respiratory: Exam negative for  acute changes, the patient does not display signs of        

      respiratory distress,  Respirations: normal, Breath sounds: are clear throughout,           

      Respiratory rate:  18                                                                       

16:25 Back: pain, that is mild, of the  thoracic area, ROM is painful, with flexion, with         

      extension, normal spinal alignment noted, CVA tenderness, is absent, muscle spasm, is       

      not present,                                                                                

16:37 ECG was reviewed by the Attending Physician.                                            [facility] 

                                                                                                  

Vital Signs:                                                                                      

13:00  / 73; Pulse 86; Resp 16; Temp 98.5; Pulse Ox 99% ; Weight 97.52 kg; Height 4 ft. db  

      11 in. ;                                                                                    

14:45  / 104; Pulse 84; Resp 20; Pulse Ox 100% on R/A;                                  kj2 

15:43  / 54; Pulse 90; Resp 20; Pulse Ox 99% ;                                          kj2 

16:45  / 58; Pulse 86; Resp 18; Pulse Ox 100% on R/A;                                   kj2 

17:57  / 60; Pulse 84; Resp 18; Temp 98; Pulse Ox 100% on R/A;                          kj2 

13:00 Body Mass Index 43.42 (97.52 kg, 149.86 cm)                                             db  

                                                                                                  

MDM:                                                                                              

13:02 Medical Screening Exam initiated                                                        angel 

16:31 Differential diagnosis: chronic back pain, Obesity Osteoarthritis Osteomyelitis         angel 

      ruptured disc, spinal injury, sprain, vertebral fracture. Data reviewed: vital signs,       

      nurses notes, lab test result(s), EKG, radiologic studies, CT scan, plain films.            

      Consideration of Admission/Observation Escalation of care including                         

      admission/observation considered. I considered the following discharge prescriptions or     

      medication management in the emergency department Medications were administered in the      

      Emergency Department. See MAR. Independent interpretation of the following test(s) in       

      the Emergency Department EKG: See my EKG interpretation above. Test considered but Not      

      performed: MRI: no mri. Care significantly affected by the following chronic                

      conditions: Diabetes, Obesity.                                                              

                                                                                                  

                                                                                             

14:40 Order name: COVID-19 Ag + Flu A+B Ag; Complete Time: 16:31                              [facility] 

                                                                                             

14:40 Order name: Urinalysis w/ reflexes                                                      [facility] 

                                                                                             

14:40 Order name: PREGU; Complete Time: 16:00                                                 [facility] 

                                                                                             

15:45 Order name: CBC with Diff; Complete Time: 16:31                                         [facility] 

                                                                                             

15:45 Order name: Comprehensive Metabolic Panel; Complete Time: 16:56                         [facility] 

                                                                                             

15:45 Order name: Troponin High Sensitivity; Complete Time: 16:56                             [facility] 

                                                                                             

14:40 Order name: Chest Pa And Lat (2 Views) XRAY; Complete Time: 15:43                       [facility] 

                                                                                             

15:45 Order name: CT Chest For PE Angio; Complete Time: 17:43                                 [facility] 

                                                                                             

15:45 Order name: EKG; Complete Time: 15:45                                                   [facility] 

                                                                                             

15:45 Order name: EKG - Nurse/Tech; Complete Time: 16:36                                      [facility] 

                                                                                                  

EC:37 Rate is 70 beats/min. Rhythm is regular. QRS Axis is Normal. SD interval is normal. QRS angel 

      interval is normal. QT interval is normal. No Q waves. T waves are Normal. No ST            

      changes noted. Clinical impression: Normal ECG and No evidence of ischemia. Interpreted     

      by me. Reviewed by me.                                                                      

                                                                                                  

Administered Medications:                                                                         

16:30 Drug: NS 0.9% IV 1000 ml IV at 1 bolus Per protocol; to be given as a bolus over 60     db  

      minutes Route: IV; Rate: 1 bolus; Site: right antecubital;                                  

17:59 Follow up: IV Status: Completed infusion                                                kj2 

16:30 Drug: Ketorolac IVP 30 mg IVP once Route: IVP; Site: right antecubital;                 db  

17:59 Follow up: Response: No adverse reaction                                                kj2 

16:30 Drug: Ondansetron IVP 4 mg IVP once; over 2 minutes Route: IVP; Site: right antecubital;db  

17:59 Follow up: Response: No adverse reaction                                                kj2 

                                                                                                  

                                                                                                  

Disposition Summary:                                                                              

25 17:44                                                                                    

Discharge Ordered                                                                                 

 Notes:       Location: Home                                                                        
  angel

      Problem: new                                                                            angel 

      Symptoms: have improved                                                                 angel 

      Condition: Stable                                                                       angel 

      Diagnosis                                                                                   

        - Other injury of muscle and tendon of back wall of thorax                            angel 

        - Elevated white blood cell count                                                     angel 

      Followup:                                                                               angel 

        - With: Private Physician                                                                  

        - When: 2 - 3 days                                                                         

        - Reason: Recheck today's complaints, Re-evaluation by your physician                      

      Discharge Instructions:                                                                     

        - Discharge Summary Sheet                                                             angel 

        - Musculoskeletal Pain                                                                angel 

      Forms:                                                                                      

        - Medication Reconciliation Form                                                      angel 

        - Antibiotic Education                                                                angel 

        - Prescription Opioid Use                                                             angel 

        - Patient Portal Instructions                                                         angel 

        - Leadership Thank You Letter                                                         angel 

        - Work release form                                                                   ty  

      Prescriptions:                                                                              

        - Diclofenac Sodium 75 mg Oral tablet, delayed release (enteric coated)                    

            - take 1 tablet ORAL route 2 times per day; 20 tablet; Refills: 0, Product        angel 

      Selection Permitted                                                                         

Signatures:                                                                                       

Dispatcher [employer]                           EDAndrea Beltran MD MD cha Benton, Danielle, RN                    RN   db                                                   

Shabnam Moore RN   kj2                                                  

                                                                                                  

Corrections: (The following items were deleted from the chart)                                    

14: 14:41 COVID-19 Ag + Flu A+B Ag+I.LAB.BRZ ordered. EDMS                                  EDMS

14: 14:41 Urinalysis+U.LAB.BRZ ordered. EDMS                                                EDMS

14: 14:41 Pregnancy Test, Urine+UC.LAB.BRZ ordered. EDMS                                    EDMS

                                                                                                  

**************************************************************************************************

## 2025-03-09 NOTE — RAD REPORT
EXAMINATION: CTA CHEST PE



CLINICAL INDICATION: Chest pain



TECHNIQUE: 100 cc 370 Isovue administered intravenously. This examination was performed according to 
an angiographic protocol with 3D post-processing. This involves 3D reconstructions, MIPs, volume

rendered images and/or shaded surface rendering. One or more of the following dose reduction techniqu
es were used: Automated exposure control, adjustment of the mA and/or kV according to patient size,

and/or iterative reconstruction. Unless otherwise specified, incidental findings do not require dedic
ated imaging follow-up. [id number]. 



COMPARISON: No prior exam.



FINDINGS:





A pulmonary embolus is not seen.



An aortic aneurysm not noted. 



No pleural effusion. No pericardial effusion.



Lungs are clear.



Increased density is present within the stomach.



IMPRESSION: 



No evidence of a pulmonary embolism



Increased density within the stomach probably normal ingested contents. A bleed has a similar appeara
nce and should be correlated clinically.



Reported By: Иван Bonilla 

Electronically Signed:  3/9/2025 5:30 PM

## 2025-03-09 NOTE — RAD REPORT
Procedure: Chest Pa And Lat (2 Views)



HISTORY: Cough                    



COMPARISON: none



FINDINGS:



The lungs appear clear of acute infiltrate.



No significant pleural effusion noted.



The heart is normal size.



IMPRESSION:



No acute abnormality is displayed.



Reported By: Иван Bonilla 

Electronically Signed:  3/9/2025 3:10 PM

## 2025-03-09 NOTE — ER
Nurse's Notes                                                                                     

 [facility]                                                                 

Name: Joi Duncan                                                                                

Age: 34 yrs                                                                                       

Sex: Female                                                                                       

: 1990                                                                                   

MRN: [id number]                                                                                   

Arrival Date: 2025                                                                          

Time: 12:31                                                                                       

Account#: [id number]                                                                            

Bed 14                                                                                            

Private MD:                                                                                       

Diagnosis: Other injury of muscle and tendon of back wall of thorax;Elevated white blood cell count

                                                                                                  

Presentation:                                                                                     

                                                                                             

13:00 Chief complaint: Patient states: COUGH, CONGESTION, LUMP ON NECK AND UPPER BACK PAIN X  db  

      1 WEEK. STATES WENT TO PHYSICIAN AND WAS GIVEN MEDICINE FOR NECK PAIN. STATES LUMP ON       

      BACK OF NECK IS GETTING WORSE. Coronavirus screen: Client denies travel out of the [place]     

      in the last 14 days. At this time, the client does not indicate any symptoms associated     

      with coronavirus-19. Ebola Screen: Patient negative for fever greater than or equal to      

      101.5 degrees Fahrenheit, and additional compatible Ebola Virus Disease symptoms            

      Patient denies exposure to infectious person. Patient denies travel to an                   

      Ebola-affected area in the 21 days before illness onset. No symptoms or risks               

      identified at this time. Initial Sepsis Screen: Does the patient meet any 2 criteria?       

      No. Patient's initial sepsis screen is negative. Does the patient have a suspected          

      source of infection? No. Patient's initial sepsis screen is negative. Risk Assessment:      

      Do you want to hurt yourself or someone else? Patient reports no desire to harm self or     

      others. Onset of symptoms was 2025.                                               

13:00 Method Of Arrival: Ambulatory                                                           db  

13:00 Acuity: DIEGO 3                                                                           db  

                                                                                                  

Triage Assessment:                                                                                

13:01 General: Appears in no apparent distress. comfortable, Behavior is calm, cooperative.   db  

      Pain: Complains of pain in back of neck. Neuro: Level of Consciousness is awake, alert,     

      obeys commands, Oriented to person, place, time, situation. Cardiovascular:.                

                                                                                                  

OB/GYN:                                                                                           

13:02 LMP 2025, Pregnancy unknown                                                        db  

                                                                                                  

Historical:                                                                                       

- Allergies:                                                                                      

13:01 No Known Allergies;                                                                     db  

- PMHx:                                                                                           

13:01 gestational diabetes;                                                                   db  

                                                                                                  

- Immunization history:: Adult Immunizations unknown.                                             

- Infectious Disease History:: Denies.                                                            

- Social history:: Smoking status: Patient denies any tobacco usage or history of.                

- Family history:: not pertinent.                                                                 

                                                                                                  

                                                                                                  

Screenin:45 [facility] ED Fall Risk Assessment (Adult) History of falling in the last 3 months,       kj2 

      including since admission No falls in past 3 months (0 pts) Confusion or Disorientation     

      No (0 pts) Intoxicated or Sedated No (0 pts) Impaired Gait No (0 pts) Mobility Assist       

      Device Used No (0 pt) Altered Elimination No (0 pt) Score/Fall Risk Level 0 - 2 = Low       

      Risk Maintained a safe environment, Hourly rounding (assess needs \T\ fall precautionary    

      measures) done. Abuse screen: Denies threats or abuse. Denies injuries from another.        

      Nutritional screening: No deficits noted. Tuberculosis screening: No symptoms or risk       

      factors identified.                                                                         

                                                                                                  

Assessment:                                                                                       

14:45 General: Appears in no apparent distress. Behavior is calm, cooperative. Pain:          kj2 

      Complains of pain in back of neck Pain currently is 8 out of 10 on a pain scale. Neuro:     

      Level of Consciousness is awake, alert, Oriented to person, place, time, situation.         

      Cardiovascular: Patient's skin is warm and dry. Respiratory: Airway is patent               

      Respiratory effort is even, unlabored. GI: No signs and/or symptoms were reported           

      involving the gastrointestinal system. : No signs and/or symptoms were reported           

      regarding the genitourinary system.                                                         

15:43 Reassessment: Patient appears in no apparent distress at this time. Patient and/or      kj2 

      family updated on plan of care and expected duration. Pain level reassessed. Patient is     

      alert, oriented x 3, equal unlabored respirations, skin warm/dry/pink.                      

16:45 Reassessment: Patient appears in no apparent distress at this time. Patient and/or      kj2 

      family updated on plan of care and expected duration. Pain level reassessed. Patient is     

      alert, oriented x 3, equal unlabored respirations, skin warm/dry/pink.                      

17:45 Reassessment: Patient appears in no apparent distress at this time. Patient and/or      kj2 

      family updated on plan of care and expected duration. Pain level reassessed. Patient is     

      alert, oriented x 3, equal unlabored respirations, skin warm/dry/pink.                      

                                                                                                  

Vital Signs:                                                                                      

13:00  / 73; Pulse 86; Resp 16; Temp 98.5; Pulse Ox 99% ; Weight 97.52 kg; Height 4 ft. db  

      11 in. ;                                                                                    

14:45  / 104; Pulse 84; Resp 20; Pulse Ox 100% on R/A;                                  kj2 

15:43  / 54; Pulse 90; Resp 20; Pulse Ox 99% ;                                          kj2 

16:45  / 58; Pulse 86; Resp 18; Pulse Ox 100% on R/A;                                   kj2 

17:57  / 60; Pulse 84; Resp 18; Temp 98; Pulse Ox 100% on R/A;                          kj2 

13:00 Body Mass Index 43.42 (97.52 kg, 149.86 cm)                                             db  

                                                                                                  

ED Course:                                                                                        

12:35 Patient arrived in ED.                                                                  im  

13:01 Triage completed.                                                                       db  

13:01 Arm band placed on Patient placed in waiting room.                                      db  

13:02 Andrea Levin MD is Attending Physician.                                             angel 

14:15 Patient has correct armband on for positive identification. Bed in low position. Call   kj2 

      light in reach. Provided Education on: call light.                                          

15:00 Chest Pa And Lat (2 Views) XRAY In Process Unspecified.                                 EDMS

15:41 Shabnam Moore, RN is Primary Nurse.                                                   kj2 

16:36 EKG done, by ED staff, reviewed by Andrea Levin MD.                                   em1 

17:22 CT Chest For PE Angio In Process Unspecified.                                           EDMS

17:58 No provider procedures requiring assistance completed. IV discontinued, intact,         kj2 

      bleeding controlled, No redness/swelling at site. Pressure dressing applied.                

                                                                                                  

Administered Medications:                                                                         

16:30 Drug: NS 0.9% IV 1000 ml IV at 1 bolus Per protocol; to be given as a bolus over 60     db  

      minutes Route: IV; Rate: 1 bolus; Site: right antecubital;                                  

17:59 Follow up: IV Status: Completed infusion                                                kj2 

16:30 Drug: Ketorolac IVP 30 mg IVP once Route: IVP; Site: right antecubital;                 db  

17:59 Follow up: Response: No adverse reaction                                                kj2 

16:30 Drug: Ondansetron IVP 4 mg IVP once; over 2 minutes Route: IVP; Site: right antecubital;db  

17:59 Follow up: Response: No adverse reaction                                                kj2 

                                                                                                  

                                                                                                  

Medication:                                                                                       

15:49 VIS not applicable for this client.                                                     kj2 

                                                                                                  

Outcome:                                                                                          

17:44 Discharge ordered by MD.                                                                angel 

17:58 Discharged to home ambulatory,                                                          kj2 

17:58 Condition: stable                                                                           

17:58 Discharge instructions given to patient, Instructed on discharge instructions, follow       

      up and referral plans. Demonstrated understanding of instructions, follow-up care,          

18:15 Patient left the ED.                                                                    kj2 

                                                                                                  

Signatures:                                                                                       

Dispatcher [employer]                           Andrea Chavarria MD MD cha Martinez, Eric                               em1                                                  

Malorie Borrero, RN                    RN   Lyndsey Syed Krystal, RN                     RN   kj2                                                  

                                                                                                  

**************************************************************************************************

## 2025-03-09 NOTE — XMS REPORT
Continuity of Care Document



                            Created on: 2025





LACYEDVIN

External Reference #: [id number]

: 1990

Sex: Female



Demographics





                                        Address             [address] 

[place]  [address]

 

                                        Home Phone          [phone]

 

                                        Work Phone          [phone]

 

                                        Mobile Phone        [phone]

 

                                        Email Address       [email or web address]

 

                                        Preferred Language  English

 

                                        Marital Status      Unknown

 

                                        Mandaeism Affiliation Unknown

 

                                        Race                Unknown

 

                                        Additional Race(s)  Unavailable

 

                                        Ethnic Group         or 





Author





                                        Name                Unknown

 

                                        Address             [address]

[place]  [address]

 

                                        [employer] [facility] [place]

 

                                        Address             [address]

[place]  [address]

 

                                        Phone               [phone]





Support





                          Name         Relationship Address      Phone

 

                                LACY EDVIN   Personal Relationship [id number]

[address]

[place]  [address]                      [phone]

 

                                EDVIN HOUSE   Personal Relationship [id number]

[address]

[place]  [address]                      [phone]

 

                                LACYCHATOTERESA AYALA               [address]



[place]  [address]                     [phone]

 

                                NONE            Emergency Contact [address]

[place]  [address]                   [phone]

 

                          DIONICIO MILLIEDIAMOND Significant  Unknown      [phone]

[phone]





Care Team Providers





                                Care Team Member Name Role            Phone

 

                                Nydia TYRELIvett SOLITARIO Primary Care Physician [phone]

[phone]

 

                                Josué Flores Attending Clinician Unavailable

 

                                MARY CAMACHO Attending Clinician Unavailab

Mary Knowles DO Attending Clinician +

[phone]

 

                                ERIKA ROSS Attending Clinician Unavailable

 

                                Erika Gramajo Attending Clinician [phone]



 

                                Unknown, Attending Attending Clinician Unavailab

ROSIE Mckeon Attending Clinician UnavailROSIE Bess Attending Clinician Unavailtano burdick

 

                                Doctor Unassigned, No Name Attending Clinician U

VENKAT Jackson Attending Clinician Tawana

vailable

 

                                RADIOLOGY       Attending Clinician Unavailable

 

                                Radiology       Attending Clinician Unavailable

 

                                LAUREN METZGER Attending Clinician Unavaila

Lauren Palmer Attending Clinician [phone]

[phone]

 

                                Velvet Jolly MA Attending Clinician UnavailTERRI Ash Attending Clinician Unavail

able

 

                                TERRI CONTRERAS Attending Clinician Unavail

able

 

                                NAZARIO STATON  Attending Clinician Unavailable

 

                                VIN ARIZMENDI Attending Clinician Unavailable

 

                                LEXII KATE Attending Clinician Unav

ailable

 

                                Lavinia FRANCO, Lexii Davis Attending Clinician +

[phone]

 

                                Campbell FRANCO, Sheila Attending Clinician [phone]

[phone]

 

                                SHEILA HIGHTOWER    Attending Clinician Unavailable

 

                                EBRAHIM, JUANY  Attending Clinician Unavailable

 

                                Ebrahim FNP, Rania Attending Clinician [phone]

[phone]

 

                                Alicia FNP, Wesley Attending Clinician [phone]

 

                                Provider, [facility] Attending Clinician

 Unavailable

 

                                Green FNP, Becky Attending Clinician [phone]

 

                                Nurse, [facility] Attending Clinician Un

available

 

                                Ciera LASSITER, Stefani UGARTE Attending Clinician Unavailab

DOMINIK Armstrong Attending Clinician Unavailab

Gt Garner MD Attending Clinician [phone]

 

                                GT BELLE  Attending Clinician Unavailable

 

                                WESLEY REED Attending Clinician UnavailJosué Denton Admitting Clinician Unavailable

 

                                AMRY CAMACHO Admitting Clinician Unavailab

DAVID Reddy     Admitting Clinician Unavailable

 

                                LEXII KAET Admitting Clinician Unav

ailable







Payers





                    Payer Name Policy Type Policy Number Effective Date Expirati

on Date Source

 

                                                    [facility]                          [id number]           2023 

00:00:00                                            







Problems





                                                    Condition 

Name                                    Condition 

Details                                 Condition 

Category                  Status                    Onset 

Date                                    Resolution 

Date                                    Last 

Treatment 

Date                                    Treating 

Clinician                 Comments                  Source

 

                      Fibroma    Fibroma    Disease    Active     2012 

00:00:

00                                                               [facility]







Allergies, Adverse Reactions, Alerts





                                                    Allergy 

Name                                    Allergy 

Type            Status          Severity        Reaction(s)     Onset 

Date                                    Inactive 

Date                                    Treating 

Clinician                 Comments                  Source

 

                                                    AMOXICIL

RACHEL                                     DRUG 

INGREDI         Active                          Unknown-Cmnt     

00:00:

00                                                              [facility]

 

                                                    Amoxicil

rachel                                     Propensi

ty to 

adverse 

reaction

s                   Active                                  Unknown - 

See comments                             

00:00:

00                                                          Gets 

Yeast 

Infection 

- 

requires 

Flagyl 

when 

prescribe

d this 

medicatio

n                                       [facility]

 

                                                    No Known 

Allergie

s            DA           Active       U                          

00:00:

00                                                              [facility]

are 

[place]

[place]

 

                                                    No Known 

Allergie

s            DA           Active       U                          

00:00:

00                                                              [facility]

are 

[facility]

st

 

                                                    NO KNOWN 

ALLERGIE

S                                       Drug 

Class   Active                                                  [facility]







Social History





                    Social Habit Start Date Stop Date Quantity  Comments  Source

 

                    Gender identity                                         Univ

[facility]

 

                    Sexual orientation                                         U

[facility]

 

                                        Alcohol intake      2023 

00:00:00                                2023 

00:00:00                                                    [facility]

 

                                                    History of Social 

function                                2023 

00:00:00                                2023 

00:00:00                                                    [facility]

 

                                                    Alcoholic beverage 

intake                                  2023 

00:00:00                                2023 

00:00:00                                                    [facility]

 

                                                    Exposure to 

SARS-CoV-2 (event)                      2023 

00:00:00                                2023 

11:44:00            Not sure                                [facility]

 

                                                    Tobacco use and 

exposure                                2012 

00:00:00                                2012 

00:00:00                                Smokeless 

tobacco non-user                                    [facility]

 

                                                    Cigarettes smoked 

current (pack per 

day) - Reported                         2012 

00:00:00                                2012 

00:00:00                                                    [facility]

 

                                                    Cigarette 

pack-years                              2012 

00:00:00                                2012 

00:00:00                                                    [facility]

 

                                                    History of tobacco 

use                                     2009 

00:00:00                                2011 

00:00:00            Cigarette Smoker                        [facility]

 

                                                    Sex assigned at 

birth                                   1990 

00:00:00                                1990 

00:00:00                                                    [facility]







                          Smoking Status Start Date   Stop Date    Source

 

                          Ex-smoker    2012 00:00:00 2012 00:00:00 [facility]







Medications





                                                    Ordered 

Medication 

Name                                    Filled 

Medication 

Name                                    Start 

Date                                    Stop 

Date                                    Current 

Medication?                             Ordering 

Clinician       Indication      Dosage          Frequency       Signature 

(SIG)               Comments            Components          Source

 

                                                    nitrofurant

oin 

monohydrate

/macrocryst

als 100 mg 

capsule                                              

00:00:

00            Yes                  1mg                                [facility]

 

                                                    amoxicillin

-clavulanat

e 

(AUGMENTIN) 

875-125 mg 

per tablet 

1 tablet                                             

01:00:

00                                       

00:59

:00        No                               1{tbl}                1 tablet, 

Oral, 

Q12H, 2 

doses, 

First dose 

on Thu 

24 at 

2000, Last 

dose on 

24 at 

0800, 

ASAP, 

Reason for 

Anti-Infec

tive: 

Empiric 

Therapy 

for 

Suspected 

Infection, 

Empiric 

Therapy 

Site: Skin 

/ Soft 

tissue, 

Duration 

of 

therapy: 

Once (ED)                                                   [facility]

 

                                                    amoxicillin

-clavulanat

e 875-125 

mg per 

tablet                                               

00:00:

00                                       

04:59

:00        No                    [id number]  1{tbl}                Take 1 

tablet by 

mouth 

every 12 

(twelve) 

hours for 

7 days.                                                     [facility]

 

                                                    clotrimazol

e 1 % 

vaginal 

cream                                                

00:00:

00            Yes                  %                                  [facility]

 

                                                    fluconazole 

150 mg 

tablet                                               

00:00:

00            Yes                  mg                                 [facility]

 

                                                    sertraline 

50 mg 

tablet                                               

00:00:

00            Yes                  1mg                                [facility]

 

                                                    naltrexone 

50 mg 

tablet                                              30 

00:00:

00            Yes                  1mg                                [facility]

 

                                                    risperidone 

2 mg tablet                                         30 

00:00:

00            Yes                  15mg                               [facility]

 

                                                    naltrexone 

50 mg 

tablet                                               

00:00:

00            Yes                  1mg                                [facility]

 

                                                    risperidone 

1 mg tablet                                          

00:00:

00            Yes                  1mg                                [facility]

 

                                                    TAKE 3 

TABLETS BY 

MOUTH IN 

THE MORNING 

AND 3 

TABLETS IN 

THE 

EVENING. DO 

ALL THIS 

FOR 5 DAYS.                                         2023 

00:00:

00            Yes                                                     [facility]

 

                                                    nirmatrelvi

r-ritonavir 

(PAXLOVID) 

300 mg (150 

mg x 2)-100 

mg tablet                                           2023 

00:00:

00                                       

05:59

:00        No                    [id number]  3{tbl}                Take 3 

tablets by 

mouth in 

the 

morning 

and 3 

tablets in 

the 

evening. 

Do all 

this for 5 

days.                                                       [facility]

 

                                CELECOXIB                       2023

2-14 

00:00:

00            Yes                                                     [facility]

 

                                                    TAKE 2 TABS 

DAILY THE 

FIRST 5 

DAYS,THEN 1 

TAB DAILY 

THE LAST 5 

DAYS                                                2023 

00:00:

00                                       

00:00

:00     No                      20                                      [facility]

 

                                                    TAKE 1 

CAPSULE 

TWICE 

DAILY.                                              2023 

00:00:

00                                       

00:00

:00     No                      300                                     [facility]

 

                                                    TAKE 1 

CAPSULE 

DAILY WITH 

FOOD.                                               2023 

00:00:

00                                       

00:00

:00     No                      400                                     [facility]

 

                                AMOXICILLIN                      

00:00:

00            Yes                                                     [facility]

 

                                                    TAKE 1 

CAPSULE 

TWICE 

DAILY.                                               

00:00:

00                                       

00:00

:00     No                      500                                     [facility]

 

                                                    NITROFURANT

N                                                    

00:00:

00            Yes                                                     [facility]

 

                                                    TAKE 1 

CAPSULE 

TWICE 

DAILY.                                               

00:00:

00                                       

00:00

:00     No                      100                                     [facility]

 

                                                    iopamidol 

(ISOVUE 

370-500 mL) 

injection 

80 mL                                                

23:15:

00                                       

22:16

:00        No                    [id number]  80mL                  80 mL, 

Intravenou

s, ONCE, 1 

dose, On 

23 at 

1815, 

Routine                                                     [facility]

 

                                                    TAKE 1 

TABLET 

TWICE DAILY 

AFTER 

MEALS.                                               

00:00:

00                                       

00:00

:00     No                      500                                     [facility]

 

                                                    TAKE 1 

TABLET 

DAILY.                                               

00:00:

00                                       

00:00

:00     No                      50                                      [facility]

 

                                                    TAKE 1 

TABLET FOR 

MIGRAINE 

RELIEF. MAY 

REPEAT 

EVERY 2 

HOURS. MAX 

200MG/DAY.                                           

00:00:

00                                       

00:00

:00     No                      50                                      [facility]

 

                                                    TAKE 2 TABS 

DAILY THE 

FIRST 5 

DAYS,THEN 1 

TAB DAILY 

THE LAST 5 

DAYS                                                 

00:00:

00                                       

00:00

:00     No                      20                                      [facility]

 

                                                    dexamethaso

ne 

(DECADRON 

PHOSPHATE) 

injection 

10 mg                                                

18:15:

00                                       

17:37

:00        No                               10mg                  10 mg, 

Oral, 

ONCE, 1 

dose, On 

23 at 

1315, 

Routine                                                     [facility]

 

                                                    ketorolac 

(TORADOL) 

injection 

30 mg                                                

18:00:

00                                       

17:37

:00        No                               30mg                  30 mg, 

Intramuscu

lar, ONCE, 

1 dose, On 

23 at 

1300, 

Routine                                                     [facility]

 

                                                    maalox:diph

enhydrAMINE

:lidocaine 

2 % viscous 

1:1:1 

(FIRST-MOUT

[facility]) 

oral 

suspension 

15 mL                                                

17:15:

00                                       

17:37

:00        No                               15mL                  15 mL, 

Oral, 

ONCE, 1 

dose, On 

23 at 

1215, 

Routine                                                     [facility]

 

                                                    ibuprofen 

800 mg 

tablet                                               

00:00:

00                  Yes                 [id number] 800mg               Take 1 

tablet by 

mouth 

every 6 

(six) 

hours as 

needed for 

Pain 

(scale 

4-6).                                                       [facility]

 

                                                    TAKE ONE 

(1) 

TABLET(S) 

BY MOUTH 

EVERY SIX 

HOURS AS 

NEEDED FOR 

PAIN.                                                

00:00:

00                                       

00:00

:00     [facility]

 

                                                    ketorolac 

(TORADOL) 

injection 

15 mg                                               2023-0

3-13 

20:30:

00                                       

19:57

:00        No                               15mg                  15 mg, 

Intramuscu

lar, ONCE, 

1 dose, On 

Mon 

3/13/23 at 

1530, 

Routine                                                     [facility]

 

                                                    TAKE 1 

CAPSULE 

EVERY 8 

HOURS AS 

NEEDED FOR 

COUGH                                               - 

00:00:

00                                       

00:00

:00     No                      200                                     [facility]

 

                                                    TAKE ONE 

(1) 

TABLET(S) 

BY MOUTH 

ONCE. MAY 

REPEAT IN 

72 HOURS IF 

STILL 

SYMPTOMATIC

.                                                   

1-07 

00:00:

00                                       

00:00

:00     [facility]

 

                                                    AMOX/K CLAV 

875-125                                             2022

2-20 

00:00:

00                                       

00:00

:00     [facility]

 

                                                    Dose 

Unknown                                             2022- 

00:00:

00                                       

00:00

:00     No                                                              [facility]

 

                                                    AZITHROMYCI

N 250MG                                             2022 

00:00:

00                                       

00:00

:00     No                                                              [facility]

 

                                                    NITROFURANT

N CAP 100MG                                         2022 

00:00:

00                                       

00:00

:00     No                                                              [facility] 

[facility] 

Em[facility]

 

                                                    Dose 

Unknown                                             2022 

00:00:

00                                       

00:00

:00     No                                                              [facility] 

TRACE 

Emmanuel

 

                                                    Dose 

Unknown                                             2022 

00:00:

00                                       

00:00

:00     No                                                              [facility]

 

                                                    TAKE ONE 

(1) 

CAPSULE(S) 

BY MOUTH 

EVERY 

NIGHT.                                              2022 

00:00:

00                                       

00:00

:00     No                                                              Melvin 

F 

Emmanuel

 

                                                    Dose 

Unknown                                             2022 

00:00:

00                                       

00:00

:00     No                                                              Melvin 

TRACE Benitez

 

                                                    Dose 

Unknown                                             2022 

00:00:

00                                       

00:00

:00     No                                                              Melvin Benitez

 

                                                    TAKE ONE 

(1) 

TABLET(S) 

BY MOUTH 

THREE TIMES 

A DAY.                                              2022 

00:00:

00                                       

00:00

:00     No                                                              Melvin Benitez

 

                                                    Dose 

Unknown                                             2022 

00:00:

00                                       

00:00

:00     No                                                              Melvin Benitez

 

                                                    AMOX/K CLAV 

875-125                                             2022 

00:00:

00                                       

00:00

:00     No                                                              Melvin Benitez

 

                                                    BENZONATATE 

CAP 100MG                                           2022 

00:00:

00                                       

00:00

:00     No                                                              Melvin Benitez

 

                                                    Dose 

Unknown                                             2022 

00:00:

00                                       

00:00

:00     No                                                              Melvin Benitez

 

                                                    TAKE ONE 

(1) 

TEASPOONFUL

(S) BY 

MOUTH FOUR 

TIMES A DAY 

AS NEEDED 

FOR 

CONGESTION 

OR 

ALLERGIES.                                          2022 

00:00:

00                                       

00:00

:00     No                                                              Melvin 

TRACE Benitez

 

                                                    Dose 

Unknown                                             2022 

00:00:

00                                       

00:00

:00     No                                                              Melvin 

F 

Emmanuel

 

                                                    Dose 

Unknown                                             2022

2-14 

00:00:

00                                       

00:00

:00     No                                                              Melvin Benitez

 

                                                    ondansetron 

4 mg 

disintegrat

ing tablet                                          2022

0-11 

00:00:

00                                      2022-

10-17 

04:59

:00        No                    [id number]    4mg                   Take 1 

tablet by 

mouth 

every 8 

(eight) 

hours as 

needed for 

Nausea and 

Vomiting 

(N/V) for 

up to 5 

days.                                                       [facility]

 

                                                    TAKE 10 ML 

BY MOUTH 

EVERY 6 

HOURS AS 

NEEDED FOR 

COUGH                                                

00:00:

00            Yes                                                     Melvin Benitez

 

                                                    TAKE 10 ML 

BY MOUTH 

EVERY 6 

HOURS AS 

NEEDED FOR 

COUGH                                                

00:00:

00            No                                                      

 

                                                    TAKE 10 ML 

BY MOUTH 

EVERY 6 

HOURS AS 

NEEDED FOR 

COUGH                                                

00:00:

00            No                                                      

 

                                                    TAKE ONE 

(1) 

TABLET(S) 

BY MOUTH 

ONCE A DAY.                                          

00:00:

00            Yes                                                     Melvin Benitez

 

                                                    TAKE 1 

TABLET BY 

MOUTH EVERY 

DAY                                                  

00:00:

00            Yes                                                     Melvin Benitez

 

                                &lt                              

00:00:

00            Yes                  250                                Melvin Benitez

 

                                                    Dose 

Unknown                                              

00:00:

00            Yes                                                     Mlevin Benitez

 

                                                    TAKE ONE 

(1) 

TABLET(S) 

BY MOUTH 

EVERY SIX 

HOURS AS 

NEEDED FOR 

PAIN.                                                

00:00:

00            Yes                                                     Melvin Benitez

 

                                                    Dose 

Unknown                                              

00:00:

00            Yes                                                     Melvin Benitez

 

                                                    TAKE ONE 

(1) TABLET 

BY MOUTH 

ONCE DAILY.                                          

00:00:

00            No                   10                                 

 

                                                    TAKE 1 

TABLET BY 

MOUTH EVERY 

DAY                                                  

00:00:

00            No                                                      

 

                                &lt                              

00:00:

00            No                   250                                

 

                                                    Dose 

Unknown                                              

00:00:

00            No                                                      

 

                                                    TAKE ONE 

(1) 

TABLET(S) 

BY MOUTH 

EVERY SIX 

HOURS AS 

NEEDED FOR 

PAIN.                                                

00:00:

00            No                                                      

 

                                                    Dose 

Unknown                                              

00:00:

00            No                                                      

 

                                                    TAKE ONE 

(1) TABLET 

BY MOUTH 

ONCE DAILY.                                          

00:00:

00            No                   10                                 

 

                                                    TAKE 1 

TABLET BY 

MOUTH EVERY 

DAY                                                 2022-0

8-09 

00:00:

00            No                                                      

 

                                &lt                             2022-0

8-09 

00:00:

00            No                   250                                

 

                                                    Dose 

Unknown                                             0

8-09 

00:00:

00            No                                                      

 

                                                    TAKE ONE 

(1) 

TABLET(S) 

BY MOUTH 

EVERY SIX 

HOURS AS 

NEEDED FOR 

PAIN.                                               0

8-09 

00:00:

00            No                                                      

 

                                                    Dose 

Unknown                                             0

8-09 

00:00:

00            No                                                      

 

                                                    Dose 

Unknown                                             0

8-05 

00:00:

00            Yes                                                     Melvin Benitez

 

                                                    TAKE 1 

CAPSULE BY 

MOUTH EVERY 

8 HOURS                                             0

8-05 

00:00:

00            No                   500                                

 

                                                    TAKE 1 

CAPSULE BY 

MOUTH EVERY 

8 HOURS                                             0

8-05 

00:00:

00            No                   500                                

 

                                                    TAKE 1 

TABLET BY 

MOUTH EVERY 

8 HOURS AS 

NEEDED                                              0

6- 

00:00:

00            Yes                                                     Melvin Benitez

 

                                &lt                             0

 

00:00:

00            Yes                                                     Melvin Benitez

 

                                                    Dose 

Unknown                                             0

 

00:00:

00            Yes                                                     Melvin Benitez

 

                                                    TAKE 1 

TABLET BY 

MOUTH EVERY 

8 HOURS AS 

NEEDED                                              0

 

00:00:

00            No                                                      

 

                                &lt                             -0

 

00:00:

00            No                                                      

 

                                                    Dose 

Unknown                                             0

 

00:00:

00            No                                                      

 

                                                    TAKE 1 

TABLET BY 

MOUTH EVERY 

8 HOURS AS 

NEEDED                                              0

 

00:00:

00            No                                                      

 

                                &lt                             -0

 

00:00:

00            No                                                      

 

                                                    Dose 

Unknown                                             0

 

00:00:

00            No                                                      

 

                                                    Dose 

Unknown                                             0

 

00:00:

00            Yes                                                     Melvin Benitez

 

                                                    Dose 

Unknown                                             0

 

00:00:

00            Yes                                                     Melvin Benitez

 

                                                    TAKE 1 

CAPSULE BY 

MOUTH EVERY 

8 HOURS                                             0

 

00:00:

00            Yes                                                     Melvin Benitez

 

                                                    TAKE ONE 

(1) 

TEASPOONFUL

(S) BY 

MOUTH FOUR 

TIMES A DAY 

AS NEEDED 

FOR 

CONGESTION 

OR 

ALLERGIES.                                          0

 

00:00:

00            Yes                                                     Melvin Benitez

 

                                                    TAKE ONE 

(1) TABLET 

BY MOUTH 

ONCE DAILY.                                         0

 

00:00:

00            Yes                                                     Melvin Benitez

 

                                &lt                             -0

20 

00:00:

00            Yes                                                     Melvin Benitez

 

                                                    ibuprofen 

800 mg 

tablet                                              0

20 

00:00:

00            No                   1mg                                

 

                                                    cyclobenzap

rine 5 mg 

tablet                                              2022 

00:00:

00            No                   1mg                                

 

                                                    TAKE 1 

CAPSULE BY 

MOUTH EVERY 

8 HOURS                                              

00:00:

00            No                                                      

 

                                                    TAKE ONE 

(1) 

TEASPOONFUL

(S) BY 

MOUTH FOUR 

TIMES A DAY 

AS NEEDED 

FOR 

CONGESTION 

OR 

ALLERGIES.                                           

00:00:

00            No                                                      

 

                                                    TAKE ONE 

(1) TABLET 

BY MOUTH 

ONCE DAILY.                                          

00:00:

00            No                                                      

 

                                &lt                              

00:00:

00            No                                                      

 

                                                    ibuprofen 

800 mg 

tablet                                               

00:00:

00            No                   1mg                                

 

                                                    cyclobenzap

rine 5 mg 

tablet                                               

00:00:

00            No                   1mg                                

 

                                                    TAKE 1 

CAPSULE BY 

MOUTH EVERY 

8 HOURS                                              

00:00:

00            No                                                      

 

                                                    TAKE ONE 

(1) 

TEASPOONFUL

(S) BY 

MOUTH FOUR 

TIMES A DAY 

AS NEEDED 

FOR 

CONGESTION 

OR 

ALLERGIES.                                           

00:00:

00            No                                                      

 

                                                    TAKE ONE 

(1) TABLET 

BY MOUTH 

ONCE DAILY.                                          

00:00:

00            No                                                      

 

                                &lt                              

00:00:

00            No                                                      

 

                                                    bromphenira

mine-pseudo

ephedrine-D

M (BROMFED 

DM) 2-30-10 

mg/5 mL 

syrup                                                

00:00:

00                  Yes                 [id number]  5mL                 Take 5 mL 

by mouth 4 

(four) 

times 

daily as 

needed for 

Congestion

/Allergies

.                                                           [facility]

 

                                                    benzonatate 

100 mg 

capsule                                              

00:00:

00                  Yes                 [id number]  200mg               Take 2 

capsules 

by mouth 

every 8 

(eight) 

hours as 

needed for 

Cough.                                                      [facility]

 

                                                    amoxicillin 

500 mg 

capsule                                              

00:00:

00            Yes                  1mg                                Melvin Benitez

 

                                                    Dose 

Unknown                                              

00:00:

00            Yes                                                     Melvin Benitez

 

                                                    amoxicillin 

500 mg 

capsule                                              

00:00:

00            No                   1mg                                

 

                                                    Dose 

Unknown                                              

00:00:

00            No                                                      

 

                                                    amoxicillin 

500 mg 

capsule                                              

00:00:

00            No                   1mg                                

 

                                                    Dose 

Unknown                                              

00:00:

00            No                                                      

 

                                                    Macrobid 

100 mg 

capsule                                              

00:00:

00            Yes                  1mg                                Melvin Benitez

 

                                                    Macrobid 

100 mg 

capsule                                             2022-0

5-21 

00:00:

00            No                   1mg                                

 

                                                    Macrobid 

100 mg 

capsule                                             2022-0

5-21 

00:00:

00            No                   1mg                                

 

                                                    Dose 

Unknown                                             2022-0

5-12 

00:00:

00            Yes                                                     Melvin Benitez

 

                                                    Dose 

Unknown                                             2022-0

5-12 

00:00:

00            No                                                      

 

                                                    Dose 

Unknown                                             2022-0

5-12 

00:00:

00            No                                                      

 

                                                    Dose 

Unknown                                             2022-0

5-12 

00:00:

00            No                                                      

 

                                                    Dose 

Unknown                                             2022-0

5-12 

00:00:

00            No                                                      

 

                                                    Dose 

Unknown                                             2022-0

5-12 

00:00:

00            No                                                      

 

                                                    Dose 

Unknown                                             2022-0

5-12 

00:00:

00            No                                                      

 

                                                    Dose 

Unknown                                             2022-0

5-12 

00:00:

00            No                                                      

 

                                                    Dose 

Unknown                                             2022-0

5-12 

00:00:

00            No                                                      

 

                                                    Dose 

Unknown                                             2022-0

5-12 

00:00:

00            No                                                      

 

                                                    Dose 

Unknown                                             2022-0

5-12 

00:00:

00            No                                                      

 

                                                    Dose 

Unknown                                             2022-0

5-12 

00:00:

00            No                                                      

 

                                                    Dose 

Unknown                                             2022-0

5-12 

00:00:

00            No                                                      

 

                                                    Dose 

Unknown                                             2022-0

5-12 

00:00:

00            No                                                      

 

                                                    Dose 

Unknown                                             2022-0

5-12 

00:00:

00            No                                                      

 

                                                    Dose 

Unknown                                             2022-0

5-04 

00:00:

00            Yes                                                     Melvin Benitez

 

                                                    Dose 

Unknown                                             2022-0

5-04 

00:00:

00            Yes                                                     Melvin Benitez

 

                                                    Dose 

Unknown                                             2022-0

5-04 

00:00:

00            Yes                                                     Melvin Benitez

 

                                                    Dose 

Unknown                                             2022-0

5-04 

00:00:

00            Yes                                                     Melvin Benitez

 

                                                    Dose 

Unknown                                             2022-0

5-04 

00:00:

00            Yes                                                     Melvin Benitez

 

                                                    Dose 

Unknown                                             2022-0

5-04 

00:00:

00            Yes                                                     Melvin Benitez

 

                                                    Dose 

Unknown                                             2022-0

5-04 

00:00:

00            No                                                      

 

                                                    Dose 

Unknown                                             2022-0

5-04 

00:00:

00            No                                                      

 

                                                    Dose 

Unknown                                             2022-0

5-04 

00:00:

00            No                                                      

 

                                                    Dose 

Unknown                                             2022-0

5-04 

00:00:

00            No                                                      

 

                                                    Dose 

Unknown                                             2022-0

5-04 

00:00:

00            No                                                      

 

                                                    Dose 

Unknown                                             2022-0

5-04 

00:00:

00            No                                                      

 

                                                    Dose 

Unknown                                             2022-0

5-04 

00:00:

00            No                                                      

 

                                                    Dose 

Unknown                                             2022-0

5-04 

00:00:

00            No                                                      

 

                                                    Dose 

Unknown                                             -0

 

00:00:

00            No                                                      

 

                                                    Dose 

Unknown                                             -0

 

00:00:

00            No                                                      

 

                                                    Dose 

Unknown                                             -0

 

00:00:

00            No                                                      

 

                                                    Dose 

Unknown                                             -0

 

00:00:

00            No                                                      

 

                                                    Lexapro 10 

mg tablet                                           0

 

00:00:

00            Yes                  1mg                                [facility]

 

                                                    Latuda 20 

mg tablet                                           0

 

00:00:

00            Yes                  1mg                                [facility]

 

                                                    prazosin 1 

mg capsule                                          0

 

00:00:

00            Yes                  1mg                                [facility]

 

                                                    Lexapro 10 

mg tablet                                           0

 

00:00:

00            No                   1mg                                

 

                                                    Latuda 20 

mg tablet                                           0

 

00:00:

00            No                   1mg                                

 

                                                    prazosin 1 

mg capsule                                          0

 

00:00:

00            No                   1mg                                

 

                                                    Lexapro 10 

mg tablet                                           0

 

00:00:

00            No                   1mg                                

 

                                                    Latuda 20 

mg tablet                                           0

 

00:00:

00            No                   1mg                                

 

                                                    prazosin 1 

mg capsule                                          0

 

00:00:

00            No                   1mg                                

 

                                                    Latuda 20 

mg tablet                                           0

 

00:00:

00            Yes                  1mg                                [facility]

 

                                                    Lexapro 10 

mg tablet                                           0

 

00:00:

00            Yes                  1mg                                [facility]

 

                                                    prazosin 1 

mg capsule                                          0

 

00:00:

00            Yes                  1mg                                [facility]

 

                                                    Latuda 20 

mg tablet                                           0

- 

00:00:

00            No                   1mg                                

 

                                                    Lexapro 10 

mg tablet                                           0

- 

00:00:

00            No                   1mg                                

 

                                                    prazosin 1 

mg capsule                                          0

- 

00:00:

00            No                   1mg                                

 

                                                    Latuda 20 

mg tablet                                           0

- 

00:00:

00            No                   1mg                                

 

                                                    Lexapro 10 

mg tablet                                           0

- 

00:00:

00            No                   1mg                                

 

                                                    prazosin 1 

mg capsule                                          0

- 

00:00:

00            No                   1mg                                

 

                                                    azithromyci

n 250 mg 

tablet                                              2021- 

00:00:

00            Yes                  mg                                 [facility]

 

                                                    Bromfed DM 

2 mg-30 

mg-10 mg/5 

mL oral 

syrup                                               2021 

00:00:

00                        Yes                                    10mg/5 

mL                                                               [facility]

 

                                                    azithromyci

n 250 mg 

tablet                                              2021 

00:00:

00            No                   mg                                 

 

                                                    Bromfed DM 

2 mg-30 

mg-10 mg/5 

mL oral 

syrup                                               2021 

00:00:

00                        No                                     10mg/5 

mL                                                               

 

                                                    azithromyci

n 250 mg 

tablet                                              2021 

00:00:

00            No                   mg                                 

 

                                                    Bromfed DM 

2 mg-30 

mg-10 mg/5 

mL oral 

syrup                                               2021 

00:00:

00                        No                                     10mg/5 

mL                                                               

 

                                                    prednisone 

20 mg 

tablet                                              2021 

00:00:

00            Yes                  1mg                                [facility]

 

                                                    ibuprofen 

800 mg 

tablet                                              2021 

00:00:

00            Yes                  1mg                                [facility] Benitez

 

                                                    prednisone 

20 mg 

tablet                                              2021 

00:00:

00            No                   1mg                                

 

                                                    ibuprofen 

800 mg 

tablet                                              2021 

00:00:

00            No                   1mg                                

 

                                                    prednisone 

20 mg 

tablet                                              2021 

00:00:

00            No                   1mg                                

 

                                                    ibuprofen 

800 mg 

tablet                                              2021 

00:00:

00            No                   1mg                                

 

                                                    sulfamethox

azole 800 

mg-trimetho

prim 160 mg 

tablet                                               

00:00:

00            Yes                  1mg                                [facility]

 

                                                    sulfamethox

azole 800 

mg-trimetho

prim 160 mg 

tablet                                               

00:00:

00            No                   1mg                                

 

                                                    sulfamethox

azole 800 

mg-trimetho

prim 160 mg 

tablet                                               

00:00:

00            No                   1mg                                

 

                                                    pseudoephed

rine 30 mg 

tablet                                              2020

0 

00:00:

00            Yes                  12mg                               [facility]

 

                                                    pseudoephed

rine 30 mg 

tablet                                              2020

0 

00:00:

00            No                   12mg                               

 

                                                    pseudoephed

rine 30 mg 

tablet                                              2020

0 

00:00:

00            No                   12mg                               

 

                                                    metronidazo

le 500 mg 

tablet                                               

00:00:

00            Yes                  1mg                                [facility]

 

                                                    metronidazo

le 500 mg 

tablet                                               

00:00:

00            No                   1mg                                

 

                                                    metronidazo

le 500 mg 

tablet                                               

00:00:

00            No                   1mg                                

 

                                                    Clotrimazol

e (LOTRIMIN 

AF) 1 % 

Lotn                                                2013 

00:00:

00                  Yes                                               Apply to 

area(s) 2 

(two) 

times 

daily as 

needed for 

Rash or 

Itching.                                                    [facility]

 

                                                    Salicylic 

Acid 

(SELSUN 

BLUE 

NATURALS) 3 

% Sham                                              2013 

00:00:

00                  Yes                                               Apply to 

area(s) 

daily.                                                      [facility]







Immunizations





                                                    Ordered 

Immunization Name                       Filled 

Immunization Name Date            Status          Comments        Source

 

                                                    Influenza, 

injectable, Madin 

[place]son Canine 

Kidney, 

preservative-free, 

quadrivalent                            Influenza, 

injectable, Madin 

Tonja Canine 

Kidney, 

preservative-free, 

quadrivalent                            2024 

00:00:00            Completed                               [facility]

 

                                                    Influenza Virus 

Vaccine Quad IM, 

Preserv and ABX 

Free 6 MO-64 YRS 

(FLUCELVAX)                                         2023 

00:00:00            Completed                               [facility]

 

                                                    SARS-COV-2 COVID-19 

VACCINE - (MODERNA)                                 2022 

00:00:00            Completed                               [facility]

 

                                                    SARS-COV-2 COVID-19 

VACCINE - (MODERNA)                                 2022 

00:00:00            Completed                               [facility]

 

                                                    SARS-COV-2 COVID-19 

VACCINE - (MODERNA)                                 2022 

00:00:00            Completed                               [facility]

 

                                                    SARS-COV-2 COVID-19 

VACCINE - (MODERNA)                                 2022 

00:00:00            Completed                               [facility]

 

                                                    SARS-COV-2 COVID-19 

VACCINE - (MODERNA)                                 2022 

00:00:00            Completed                               [facility]

 

                                                    Influenza, 

seasonal, inj                                       2021 

00:00:00            Completed                               

 

                                                    Influenza, 

seasonal, inj                                       2021 

00:00:00            Completed                               

 

                                                    Influenza, 

seasonal, inj                           Influenza, 

seasonal, inj                           2021 

00:00:00            Completed                               [facility]

 

                                                    Influenza, 

seasonal, inj                           Influenza, 

seasonal, inj                           2021 

00:00:00            Completed                               [facility]

 

                                                    Influenza Virus 

Vaccine - Whole                                     2021 

00:00:00            Completed                               [facility]

 

                                                    Influenza Virus 

Vaccine - Whole                                     2021 

00:00:00            Completed                               [facility]

 

                                                    Influenza Virus 

Vaccine - Whole                                     2021 

00:00:00            Completed                               [facility]

 

                                                    Influenza Virus 

Vaccine - Whole                                     2021 

00:00:00            Completed                               [facility]

 

                                                    Influenza Virus 

Vaccine - Whole                                     2021 

00:00:00            Completed                               

 

                                                    SARS-COV-2 COVID-19 

VACCINE - (MODERNA)                                 2021 

00:00:00            Completed                               [facility]

 

                                                    SARS-COV-2 COVID-19 

VACCINE - (MODERNA)                                 2021 

00:00:00            Completed                               [facility]

 

                                                    SARS-COV-2 COVID-19 

VACCINE - (MODERNA)                                 2021 

00:00:00            Completed                               [facility]

 

                                                    SARS-COV-2 COVID-19 

VACCINE - (MODERNA)                                 2021 

00:00:00            Completed                               [facility]

 

                                                    SARS-COV-2 COVID-19 

VACCINE - (MODERNA)                                 2021 

00:00:00            Completed                               

 

                                HPV9                            2020 

00:00:00            Completed                               

 

                                HPV9                            2020 

00:00:00            Completed                               

 

                                HPV9            HPV9            2020 

00:00:00            Completed                               [facility]

 

                                HPV9            HPV9            2020 

00:00:00            Completed                               [facility]

 

                                                    SARS-COV-2 COVID-19 

VACCINE - (MODERNA)              Unknown      Completed                 [facility]

 

                                                    Influenza Virus 

Vaccine - Whole              Unknown      Completed                 [facility]

 

                                                    SARS-COV-2 COVID-19 

VACCINE - (MODERNA)              Unknown      Completed                 [facility]

 

                                                    Influenza Virus 

Vaccine - Whole              Unknown      Completed                 [facility]

 

                                                    SARS-COV-2 COVID-19 

VACCINE - (MODERNA)              Unknown      Completed                 [facility]

 

                                                    Influenza Virus 

Vaccine - Whole              Unknown      Completed                 [facility]

 

                                                    Influenza Virus 

Vaccine Quad IM, 

Preserv and ABX 

Free 6 MO-64 YRS 

(FLUCELVAX)               Unknown      Completed                 [facility]

 

                                                    SARS-COV-2 COVID-19 

VACCINE - (MODERNA)              Unknown      Completed                 [facility]

 

                                                    Influenza Virus 

Vaccine - Whole              Unknown      Completed                 [facility]

 

                                                    Influenza Virus 

Vaccine Quad IM, 

Preserv and ABX 

Free 6 MO-64 YRS 

(FLUCELVAX)               Unknown      Completed                 [facility]

 

                                                    SARS-COV-2 COVID-19 

VACCINE - (MODERNA)              Unknown      Completed                 [facility]

 

                                                    Influenza Virus 

Vaccine - Whole              Unknown      Completed                 [facility]

 

                                                    Influenza Virus 

Vaccine Quad IM, 

Preserv and ABX 

Free 6 MO-64 YRS 

(FLUCELVAX)               Unknown      Completed                 [facility]







Vital Signs





                      Vital Name Observation Time Observation Value Comments   RAIN ku

 

                                                    Systolic blood 

pressure        2024 14:22:00 146 mm[Hg]                      [facility]

 

                                                    Diastolic blood 

pressure        2024 14:22:00 92 mm[Hg]                       [facility]

 

                      Heart rate 2024 14:22:00 127 /min              [facility]

[facility]

 

                      Body temperature 2024 14:22:00 37.39 Tricia            

 [facility]

 

                      Respiratory rate 2024 14:22:00 16 /min              

 [facility]

 

                      Body height 2024 14:22:00 149.9 cm              [facility]

 

                      Body weight 2024 14:22:00 95.709 kg             [facility]

 

                      BMI        2024 14:22:00 42.62 kg/m2            [facility]

 

                                                    Oxygen saturation in 

Arterial blood by 

Pulse oximetry  2024 14:22:00 100 /min                        [facility]

 

                                                    Systolic blood 

pressure        2024 13:54:00 139 mm[Hg]                      [facility]

 

                                                    Diastolic blood 

pressure        2024 13:54:00 87 mm[Hg]                       [facility]

 

                      Heart rate 2024 13:54:00 79 /min               [facility]

 

                      Body temperature 2024 13:54:00 37.28 Tricia            

 [facility]

 

                      Respiratory rate 2024 13:54:00 14 /min              

 [facility]

 

                      Body height 2024 13:54:00 149.9 cm              [facility]

 

                      Body weight 2024 13:54:00 97.977 kg             [facility]

 

                      BMI        2024 13:54:00 43.63 kg/m2            [facility]

 

                                                    Oxygen saturation in 

Arterial blood by 

Pulse oximetry  2024 13:54:00 100 /min                        [facility]

 

                                                    Systolic blood 

pressure        2023 15:52:00 124 mm[Hg]                      [facility]

 

                                                    Diastolic blood 

pressure        2023 15:52:00 87 mm[Hg]                       [facility]

 

                      Heart rate 2023 15:52:00 81 /min               [facility]

[facility]

 

                      Body temperature 2023 15:52:00 37.06 Tricia            

 [facility]

 

                      Respiratory rate 2023 15:52:00 16 /min              

 [facility]

 

                      Body height 2023 15:52:00 149.9 cm              [facility]

[facility]

 

                      Body weight 2023 15:52:00 95.851 kg             [facility]

[facility]

 

                      BMI        2023 15:52:00 42.68 kg/m2            [facility]

 

                                                    Oxygen saturation in 

Arterial blood by 

Pulse oximetry  2023 15:52:00 98 /min                         [facility]

 

                                                    Systolic blood 

pressure        2023 16:38:00 155 mm[Hg]                      [facility]

 

                                                    Diastolic blood 

pressure        2023 16:38:00 80 mm[Hg]                       [facility]

 

                      Heart rate 2023 16:38:00 90 /min               [facility]

[facility]

 

                      Body temperature 2023 16:38:00 37.39 Tricia            

 [facility]

 

                      Respiratory rate 2023 16:38:00 18 /min              

 [facility]

 

                      Body weight 2023 16:38:00 102.059 kg            [facility]

 

                      BMI        2023 16:38:00 45.44 kg/m2            [facility]

 

                                                    Oxygen saturation in 

Arterial blood by 

Pulse oximetry  2023 16:38:00 99 /min                         [facility]

 

                                                    Systolic blood 

pressure        2023 18:57:00 135 mm[Hg]                      [facility]

 

                                                    Diastolic blood 

pressure        2023 18:57:00 74 mm[Hg]                       [facility]

 

                      Heart rate 2023 18:57:00 77 /min               [facility]

[facility]

 

                      Body temperature 2023 18:57:00 37.11 Tricia            

 [facility]

 

                      Respiratory rate 2023 18:57:00 18 /min              

 [facility]

 

                      Body weight 2023 18:57:00 102.059 kg            [facility]

[facility]

 

                      BMI        2023 18:57:00 45.44 kg/m2            [facility]

[facility]

 

                                                    Oxygen saturation in 

Arterial blood by 

Pulse oximetry  2023 18:57:00 99 /min                         [facility]

 

                                                    Systolic blood 

pressure        2023 16:46:00 136 mm[Hg]                      [facility]

 

                                                    Diastolic blood 

pressure        2023 16:46:00 76 mm[Hg]                       [facility]

 

                      Heart rate 2023 16:45:00 77 /min               [facility]

[facility]

 

                      Body temperature 2023 16:45:00 36.72 Tricia            

 [facility]

 

                      Respiratory rate 2023 16:45:00 18 /min              

 [facility]

 

                      Body height 2023 16:45:00 149.9 cm              [facility]

[facility]

 

                      Body weight 2023 16:45:00 102.059 kg            [facility]iv

[facility]

 

                      BMI        2023 16:45:00 45.44 kg/m2            [facility]

 

                                                    Oxygen saturation in 

Arterial blood by 

Pulse oximetry  2023 16:45:00 98 /min                         [facility]

 

                                                    Systolic blood 

pressure        2022-10-11 17:06:00 132 mm[Hg]                      [facility]

 

                                                    Diastolic blood 

pressure        2022-10-11 17:06:00 85 mm[Hg]                       [facility]

 

                      Heart rate 2022-10-11 17:06:00 111 /min              Unive

[facility]

 

                      Body temperature 2022-10-11 17:06:00 37 Tricia               

 [facility]

 

                      Respiratory rate 2022-10-11 17:06:00 16 /min              

 [facility]

 

                      Body height 2022-10-11 17:06:00 149.9 cm              Univ

[facility]

 

                      Body weight 2022-10-11 17:06:00 107.049 kg            Univ

[facility]

 

                      BMI        2022-10-11 17:06:00 47.67 kg/m2            [facility]

 

                                                    Oxygen saturation in 

Arterial blood by 

Pulse oximetry  2022-10-11 17:06:00 100 /min                        [facility]

[facility] 

[facility]

 

                      BP Systolic 2025 08:12:00 128 mm[Hg]            Step

hen F Emmanuel

 

                      BP Diastolic 2025 08:12:00 83 mm[Hg]             Julius

phen F Emmanuel

 

                      Weight Measured 2025 08:12:00 218.00 pounds         

   Melvin F Emmanuel

 

                      Height Measured 2025 08:12:00 59.00 inches          

  Melvin F Emmanuel

 

                      Body Temperature 2025 08:12:00 97.30 degrees        

    Melvin F Emmanuel

 

                      Heart Rate 2025 08:12:00 96.00 /min            Noemi

en F Emmanuel

 

                      Respiratory Rate 2025 08:12:00 18.00 /min           

 Melvin F Emmanuel

 

                      BP Systolic 2024 11:07:00 113 mm[Hg]            Step

hen F Emmanuel

 

                      BP Diastolic 2024 11:07:00 63 mm[Hg]             Julius

phen F Emmanuel

 

                      Weight Measured 2024 11:07:00 212.80 pounds         

   Melvin F Emmanuel

 

                      Height Measured 2024 11:07:00 59.00 inches          

  Melvin F Emmanuel

 

                      Body Temperature 2024 11:07:00 98.00 degrees        

    Melvin F Emmanuel

 

                      Heart Rate 2024 11:07:00 63.00 /min            Noemi

en F Emmanuel

 

                      Respiratory Rate 2024 11:07:00 18.00 /min           

 Melvin F Emmanuel

 

                      BP Systolic 2024 11:00:00 130 mm[Hg]            Step

hen F Emmanuel

 

                      BP Diastolic 2024 11:00:00 72 mm[Hg]             Julius

phen F Emmanuel

 

                      Weight Measured 2024 11:00:00 217.20 pounds         

   Melvin F Emmanuel

 

                      Height Measured 2024 11:00:00 59.00 inches          

  Melvin F Emmanuel

 

                      Body Temperature 2024 11:00:00 97.30 degrees        

    Melvin F Emmanuel

 

                      Heart Rate 2024 11:00:00 67.00 /min            Noemi

en F Emmanuel

 

                      Respiratory Rate 2024 11:00:00 19.00 /min           

 Melvin F Emmanuel

 

                      BP Systolic 2024 14:02:00 109 mm[Hg]            Step

hen F Emmanuel

 

                      BP Diastolic 2024 14:02:00 74 mm[Hg]             Julius

phen F Emmanuel

 

                      Weight Measured 2024 14:02:00 211.80 pounds         

   Melvin F Emmanuel

 

                      Height Measured 2024 14:02:00 59.00 inches          

  Melvin F Emmanuel

 

                      Body Temperature 2024 14:02:00 98.10 degrees        

    Melvin F Emmanuel

 

                      Heart Rate 2024 14:02:00 104.00 /min            Step

hen F Emmanuel

 

                      Respiratory Rate 2024 14:02:00 20.00 /min           

 Melvin F Emmanuel

 

                      BP Systolic 2023 10:54:00 112 mm[Hg]            Step

hen F Emmanuel

 

                      BP Diastolic 2023 10:54:00 67 mm[Hg]             Julius

phen F Emmanuel

 

                      Weight Measured 2023 10:54:00                       

Melvin F Emmanuel

 

                      Height Measured 2023 10:54:00                       

Melvin F Emmanuel

 

                      Body Temperature 2023 10:54:00 98.60 degrees        

    Melvin F Emmanuel

 

                      Heart Rate 2023 10:54:00 80.00 /min            Noemi

en F Emmanuel

 

                      Respiratory Rate 2023 10:54:00                      

 Melvin F Emmanuel

 

                      BP Systolic 2023 09:20:00 126 mm[Hg]            Step

hen F Emmanuel

 

                      BP Diastolic 2023 09:20:00 83 mm[Hg]             Julisu

phen F Emmanuel

 

                      Weight Measured 2023 09:20:00 210.60 pounds         

   Melvin F Emmanuel

 

                      Height Measured 2023 09:20:00 59.00 inches          

  Melvin F Emmanuel

 

                      Body Temperature 2023 09:20:00 97.70 degrees        

    Melvin F Emmanuel

 

                      Heart Rate 2023 09:20:00 80.00 /min            Noemi

en F Emmanuel

 

                      Respiratory Rate 2023 09:20:00                      

 Melvin F Emmanuel

 

                      BP Systolic 2023 16:15:00 134 mm[Hg]            Step

hen F Emmanuel

 

                      BP Diastolic 2023 16:15:00 79 mm[Hg]             Julius

phen F Emmanuel

 

                      Weight Measured 2023 16:15:00 166.60 pounds         

   Melvin F Emmanuel

 

                      Height Measured 2023 16:15:00 59.00 inches          

  Melvin F Emmanuel

 

                      Body Temperature 2023 16:15:00 98.10 degrees        

    Melvin F Emmanuel

 

                      Heart Rate 2023 16:15:00 95.00 /min            Noemi

en F Emmaunel

 

                      Respiratory Rate 2023 16:15:00 18.00 /min           

 Melvin F Emmanuel

 

                      BP Systolic 2022-12-15 13:25:00 111 mm[Hg]            Step

hen F Emmanuel

 

                      BP Diastolic 2022-12-15 13:25:00 73 mm[Hg]             Julius

phen F Emmanuel

 

                      Weight Measured 2022-12-15 13:25:00 220.60 pounds         

   Melvin F Emmanuel

 

                      Height Measured 2022-12-15 13:25:00 59.00 inches          

  Melvin F Emmanuel

 

                      Body Temperature 2022-12-15 13:25:00 98.00 degrees        

    Melvin F Emmanuel

 

                      Heart Rate 2022-12-15 13:25:00 96.00 /min            Noemi

en F Emmanuel

 

                      Respiratory Rate 2022-12-15 13:25:00                      

 Melvin F Emmanuel

 

                      BP Systolic 2022-10-21 10:17:00 117 mm[Hg]            Step

hen F Emmanuel

 

                      BP Diastolic 2022-10-21 10:17:00 88 mm[Hg]             Julius

phen F Emmanuel

 

                      Weight Measured 2022-10-21 10:17:00 220.60 pounds         

   Melvin F Emmanuel

 

                      Height Measured 2022-10-21 10:17:00 59.00 inches          

  Melvin F Emmanuel

 

                      Body Temperature 2022-10-21 10:17:00 98.80 degrees        

    Melvin F Emmanuel

 

                      Heart Rate 2022-10-21 10:17:00 97.00 /min            Noemi

en F Emmanuel

 

                      Respiratory Rate 2022-10-21 10:17:00 18.00 /min           

 Melvin F Emmanuel

 

                      BP Systolic 2022-10-12 11:13:00 117 mm[Hg]            Step

hen F Emmanuel

 

                      BP Diastolic 2022-10-12 11:13:00 83 mm[Hg]             Julius

phen F Emmanuel

 

                      Weight Measured 2022-10-12 11:13:00 216.00 pounds         

   Melvin F Emmanuel

 

                      Height Measured 2022-10-12 11:13:00 59.00 inches          

  Melvin F Emmanuel

 

                      Body Temperature 2022-10-12 11:13:00 97.40 degrees        

    Melvin F Emmanuel

 

                      Heart Rate 2022-10-12 11:13:00 95.00 /min            Noemi

en F Emmanuel

 

                      Respiratory Rate 2022-10-12 11:13:00 17.00 /min           

 Melvin F Emmanuel

 

                      BP Systolic 2022 16:31:00 139 mm[Hg]            Step

hen F Emmanuel

 

                      BP Diastolic 2022 16:31:00 77 mm[Hg]             Julius

phen F Emmanuel

 

                      Weight Measured 2022 16:31:00 225.00 pounds         

   Melvin F Emmanuel

 

                      Height Measured 2022 16:31:00 65.00 inches          

  Melvin F Emmanuel

 

                      Body Temperature 2022 16:31:00 98.30 degrees        

    Melvin F Emmanuel

 

                      Heart Rate 2022 16:31:00 81.00 /min            Noemi

en F Emmanuel

 

                      Respiratory Rate 2022 16:31:00 16.00 /min           

 Melvin F Emmanuel

 

                      BP Systolic 2022 13:21:00 125 mm[Hg]            Step

hen F Emmanuel

 

                      BP Diastolic 2022 13:21:00 86 mm[Hg]             Julius

phen F Emmanuel

 

                      Weight Measured 2022 13:21:00 225.00 pounds         

   Melvin F Emmanuel

 

                      Height Measured 2022 13:21:00 65.00 inches          

  Melvin F Emmanuel

 

                      Body Temperature 2022 13:21:00 99.00 degrees        

    Melvin F Emmanuel

 

                      Heart Rate 2022 13:21:00 84.00 /min            Noemi

en F Emmanuel

 

                      Respiratory Rate 2022 13:21:00 16.00 /min           

 Melvin F Emmanuel

 

                      BP Systolic 2022 09:41:00 123 mm[Hg]            Step

hen F Emmanuel

 

                      BP Diastolic 2022 09:41:00 84 mm[Hg]             Julius

phen F Emmanuel

 

                      Weight Measured 2022 09:41:00 228.00 pounds         

   Melvin F Emmanuel

 

                      Height Measured 2022 09:41:00 65.00 inches          

  Melvin F Emmanuel

 

                      Body Temperature 2022 09:41:00 98.40 degrees        

    Melvin F Emmanuel

 

                      Heart Rate 2022 09:41:00 90.00 /min            Noemi

en F Emmanuel

 

                      Respiratory Rate 2022 09:41:00                      

 Melvin Benitez

 

                      BP Systolic 2022 11:34:00 148 mm[Hg]            

 

                      BP Diastolic 2022 11:34:00 58 mm[Hg]             

 

                      Weight Measured 2022 11:34:00 231.00 pounds         

   

 

                      Height Measured 2022 11:34:00 65.00 inches          

  

 

                      Body Temperature 2022 11:34:00 97.90 degrees        

    

 

                      Heart Rate 2022 11:34:00 98.00 /min            

 

                      Respiratory Rate 2022 11:34:00 18.00 /min           

 

 

                      BP Systolic 2021 11:02:00 114 mm[Hg]            

 

                      BP Diastolic 2021 11:02:00 76 mm[Hg]             

 

                      Weight Measured 2021 11:02:00 231.00 pounds         

   

 

                      Height Measured 2021 11:02:00 65.00 inches          

  

 

                      Body Temperature 2021 11:02:00 98.10 degrees        

    

 

                      Heart Rate 2021 11:02:00 80.00 /min            

 

                      Respiratory Rate 2021 11:02:00 17.00 /min           

 

 

                      BP Systolic 2021 13:30:00 117 mm[Hg]            

 

                      BP Diastolic 2021 13:30:00 46 mm[Hg]             

 

                      Weight Measured 2021 13:30:00 230.60 pounds         

   

 

                      Height Measured 2021 13:30:00 65.00 inches          

  

 

                      Body Temperature 2021 13:30:00 98.10 degrees        

    

 

                      Heart Rate 2021 13:30:00 90.00 /min            

 

                      Respiratory Rate 2021 13:30:00 16.00 /min           

 

 

                      Body Temperature 2021 17:35:00 98.10 degrees        

    

 

                      Heart Rate 2021 17:35:00 86.00 /min            

 

                      Respiratory Rate 2021 17:35:00                      

 

 

                      BP Systolic 2021 17:35:00 120 mm[Hg]            

 

                      BP Diastolic 2021 17:35:00 75 mm[Hg]             

 

                      Weight Measured 2021 17:35:00 236.40 pounds         

   

 

                      Height Measured 2021 17:35:00 65.00 inches          

  

 

                      BP Systolic 2021-10-25 17:06:00 120 mm[Hg]            

 

                      BP Diastolic 2021-10-25 17:06:00 75 mm[Hg]             

 

                      Weight Measured 2021-10-25 17:06:00 238.20 pounds         

   

 

                      Height Measured 2021-10-25 17:06:00 65.00 inches          

  

 

                      Body Temperature 2021-10-25 17:06:00 98.20 degrees        

    

 

                      Heart Rate 2021-10-25 17:06:00 104.00 /min            

 

                      Respiratory Rate 2021-10-25 17:06:00                      

 

 

                      BP Systolic 2021 11:25:00 128 mm[Hg]            

 

                      BP Diastolic 2021 11:25:00 82 mm[Hg]             

 

                      Weight Measured 2021 11:25:00 228.40 pounds         

   

 

                      Height Measured 2021 11:25:00 65.00 inches          

  

 

                      Body Temperature 2021 11:25:00 98.00 degrees        

    

 

                      Heart Rate 2021 11:25:00 98.00 /min            

 

                      Respiratory Rate 2021 11:25:00 17.00 /min           

 







Procedures





                          Procedure    Date / Time Performed Performing Clinicia

n Source

 

                          XR HAND 3+ VW LEFT 2024 15:30:25 Shannon Camacho [facility]

 

                                                    POCT SARS-COV-2 

ANTIGEN (BINAX NOW) 2023 16:00:00 Erika Ross     [facility]

 

                                                    REFERRAL- 

REQUEST/RESPONSE          2023-10-11 05:01:00       Doctor Unassigned, No 

Name                                    [facility]

 

                                                    HB CREATININE 

SERUM/BLOOD FOR 

IMAGING             2023 21:41:00 Radiology           [facility]

 

                                                    REFERRAL- 

REQUEST/RESPONSE          2023 05:01:00       Doctor Unassigned, No 

Name                                    [facility]

 

                                ASSIGNMENT OF BENEFITS 2023 17:56:32 Docto

r Unassigned, No 

Name                                    [facility]

 

                                                    RAPID STREP SCREEN FOR 

GROUP A             2023 16:47:00 Celena Harvey  [facility]

 

                                                    COVID-19 (ID NOW RAPID 

TESTING)            2023 16:47:00 Lauren Metzger [facility]

 

                                                    CONSENT/REFUSAL FOR 

DIAGNOSIS AND 

TREATMENT                 2023 16:29:53       Doctor Unassigned, No 

Name                                    [facility]

 

                                ASSIGNMENT OF BENEFITS 2023 20:10:12 Docgini

r Unassigned, No 

Name                                    [facility]

 

                                                    CONSENT/REFUSAL FOR 

DIAGNOSIS AND 

TREATMENT                 2023 20:09:49       Doctor Unassigned, No 

Name                                    [facility]

 

                                XR CERVICAL SPINE 2 VW 2023 20:02:42 Lexii Mace                                   [facility]

 

                                XR FOOT 3+ VW LEFT 2023 20:02:42 Lexii Singleton                                   [facility]

 

                                XR KNEE 3 VW LEFT 2023 20:02:42 Lexii Herrera                                   [facility]

 

                                POCT PREGNANCY TEST 2023 19:56:00 Lexii Conklin 

Susan                                   [facility]

 

                                                    CONSENT/REFUSAL FOR 

DIAGNOSIS AND 

TREATMENT                 2023 18:51:27       Doctor Unassigned, No 

Name                                    [facility]

 

                                ASSIGNMENT OF BENEFITS 2023 16:28:22 Docgini

r Unassigned, No 

Name                                    [facility]

 

                                                    [address] Exc B9 Les Mrgn 

Xcp Sk Tg T/a/l 0.5 Cm 2021 00:00:00                     Melvin TRACE Emmanuel

 

                          [id number]      2021 00:00:00 XYDNI        [facility]

[facility]

 

                          [id number]      2021 00:00:00 XYDNI        [facility]

[place] 

[facility]

 

                          [id number]      2021 00:00:00 XYDNI        [facility]

[place] 

[facility]

 

                          [id number]      2021 00:00:00 XYDNI        [facility]

[place] 

[facility]







Plan of Care





                      Planned Activity Planned Date Details    Comments   Source

 

                      Goal                  Plan of Care Note [code = 45240-1]  

          

 

                      Goal                  Plan of Care Note [code = 99420-4]  

          

 

                      Goal                  Plan of Care Note [code = 29568-2]  

          

 

                      Goal                  Plan of Care Note [code = 13805-8]  

          

 

                      Goal                  Plan of Care Note [code = 96603-5]  

          

 

                      Goal                  Plan of Care Note [code = 12071-3]  

          

 

                      Goal                  Plan of Care Note [code = 17569-1]  

          

 

                      Goal                  Plan of Care Note [code = 55588-4]  

          

 

                      Goal                  Plan of Care Note [code = 96124-8]  

          

 

                      Goal                  Plan of Care Note [code = 13885-6]  

          

 

                      Goal                  Plan of Care Note [code = 07206-4]  

          

 

                      Goal                  Plan of Care Note [code = 81952-8]  

          

 

                      Goal                  Plan of Care Note [code = 05710-4]  

          

 

                      Goal                  Plan of Care Note [code = 68069-5]  

          

 

                      Goal                  Plan of Care Note [code = 35789-4]  

          

 

                      Goal                  Plan of Care Note [code = 29014-0]  

          

 

                      Goal                  Plan of Care Note [code = 53992-0]  

          

 

                      Goal                  Plan of Care Note [code = 46601-8]  

          

 

                      Goal                  Plan of Care Note [code = 36596-2]  

          

 

                      Goal                  Plan of Care Note [code = 14469-8]  

          

 

                      Goal                  Plan of Care Note [code = 05624-5]  

          

 

                      Goal                  Plan of Care Note [code = 02544-4]  

          

 

                      Goal                  Plan of Care Note [code = 70456-9]  

          

 

                      Goal                  Plan of Care Note [code = 60384-9]  

          

 

                      Goal                  Plan of Care Note [code = 59971-3]  

          

 

                      Goal                  Plan of Care Note [code = 54346-4]  

          

 

                      Goal                  Plan of Care Note [code = 43635-7]  

          

 

                      Goal                  Plan of Care Note [code = 14272-5]  

          

 

                      Goal                  Plan of Care Note [code = 93617-0]  

          

 

                      Goal                  Plan of Care Note [code = 31344-2]  

          

 

                      Goal                  Plan of Care Note [code = 23481-3]  

          

 

                      Goal                  Plan of Care Note [code = 95332-3]  

          

 

                      Goal                  Plan of Care Note [code = 03208-6]  

          

 

                      Goal                  Plan of Care Note [code = 02471-3]  

          

 

                      Goal                  Plan of Care Note [code = 76228-4]  

          

 

                      Goal                  Plan of Care Note [code = 22059-4]  

          

 

                      Goal                  Plan of Care Note [code = 94222-0]  

          

 

                      Goal                  Plan of Care Note [code = 68626-6]  

          

 

                      Goal                  Plan of Care Note [code = 68714-3]  

          

 

                      Goal                  Plan of Care Note [code = 38864-2]  

          

 

                      Goal                  Plan of Care Note [code = 79779-8]  

          

 

                      Goal                  Plan of Care Note [code = 99944-3]  

          

 

                      Goal                  Plan of Care Note [code = 41404-5]  

          

 

                      Goal                  Plan of Care Note [code = 60556-5]  

          

 

                      Goal                  Plan of Care Note [code = 05338-0]  

          

 

                      Goal                  Plan of Care Note [code = 46819-5]  

          

 

                      Goal                  Plan of Care Note [code = 22755-4]  

          

 

                      Goal                  Plan of Care Note [code = 38391-3]  

          

 

                      Goal                  Plan of Care Note [code = 90545-5]  

          

 

                      Goal                  Plan of Care Note [code = 59351-4]  

          

 

                      Goal                  Plan of Care Note [code = 19001-8]  

          

 

                      Goal                  Plan of Care Note [code = 31504-3]  

          

 

                      Goal                  Plan of Care Note [code = 35972-9]  

          

 

                      Goal                  Plan of Care Note [code = 55055-1]  

          

 

                      Goal                  Plan of Care Note [code = 38027-6]  

          

 

                      Goal                  Plan of Care Note [code = 49921-8]  

          

 

                      Goal                  Plan of Care Note [code = 40077-0]  

          

 

                      Goal                  Plan of Care Note [code = 53271-9]  

          

 

                      Goal                  Plan of Care Note [code = 67909-7]  

          

 

                      Goal                  Plan of Care Note [code = 06385-4]  

          

 

                      Goal                  Plan of Care Note [code = 86681-1]  

          

 

                      Goal                  Plan of Care Note [code = 21287-0]  

          

 

                      Goal                  Plan of Care Note [code = 14145-9]  

          

 

                      Goal                  Plan of Care Note [code = 43072-8]  

          

 

                      Goal                  Plan of Care Note [code = 52007-1]  

          

 

                      Goal                  Plan of Care Note [code = 24193-0]  

          

 

                      Goal                  Plan of Care Note [code = 16440-6]  

          

 

                      Goal                  Plan of Care Note [code = 84522-0]  

          

 

                      Goal                  Plan of Care Note [code = 85644-7]  

          

 

                      Goal                  Plan of Care Note [code = 00867-1]  

          

 

                      Goal                  Plan of Care Note [code = 69315-4]  

          

 

                      Goal                  Plan of Care Note [code = 22446-0]  

          

 

                      Goal                  Plan of Care Note [code = 12729-9]  

          







Encounters





                                                    Start 

Date/Time                               End 

Date/Time                               Encounter 

Type                                    Admission 

Type                                    Attending 

[facility] 

Facility                                Care 

Department                              Encounter 

ID                                      Source

 

                                                    2021 

11:43:00                        Inpatient       Josué Gonzalez               LD                  [id number]

87                                      [facility]

are 

[facility]

 

                                                    2021 

00:03:00                        Inpatient                       Josué Flores               CATHY                [id number]

66                                      [facility]

are 

[facility]

 

                                                    2021 

11:25:00                        Inpatient                       Josué FloresW               CATHY                [id number]

07                                      [facility]

are 

[facility]

 

                                                    2025 

08:13:57                                2025 

08:13:57   Outpatient                       SFA        SFA        

0221                                    [facility]

 

                                                    2025 

00:00:00                                2025 

00:00:00                                Outpatient 

Visit                                  SFA          [id number]   [id number]

[id number]

[id number]

[id number]                                  [facility]

 

                                                    2024 

08:24:00                                2024 

08:47:00  Emergency X                   [facility]      ERT       [id number] [facility]

 

                                                    2024 

08:24:00                                2024 

08:47:00        Emergency                                       [facility] AT 

[facility]                                 1.2.840.114

350.1.13.10

4.2.7.2.686

[id number]

084                       [id number]                 [facility]

 

                                                    2024 

08:55:00                                2024 

12:00:00            Emergency           X                   MARY CAMACHO         [facility]            ERT             [id number]      [facility]

 

                                                    2024 

08:55:00                                2024 

12:00:00            Emergency                               Mary Camacho                                 [facility] AT 

[facility]                                 1.2.840.114

350.1.13.10

4.2.7.2.686

[id number]

084                       [id number]                 [facility]

 

                                                    2024 

11:01:14                                2024 

11:01:14   Outpatient                       SFA        SFA        918                                    [facility]

 

                                                    2024 

00:00:00                                2024 

00:00:00                                Outpatient 

Visit                                  SFA          [id number]   [id number]

[id number]

[id number]

[id number]                                  [facility]

 

                                                    2024 

10:50:20                                2024 

10:50:20   Outpatient                       SFA        SFA        

0829                                    [facility]

 

                                                    2024 

00:00:00                                2024 

00:00:00                                Outpatient 

Visit                                  SFA          [id number]   [id number]

[id number]

[id number]

[id number]                                  [facility]

 

                                                    2024 

11:01:05                                2024 

11:01:05   Outpatient                       SFA        [facility]        731                                    Mel[facility] Benitez

 

                                                    2024 

10:54:46                                2024 

10:54:46   Outpatient                       SFA        [facility]        530                                    Mel[facility] Benitez

 

                                                    2024 

15:37:46                                2024 

15:37:46   Outpatient                       SFA        [facility]        519                                    Mel[facility] Benitez

 

                                                    2024 

08:22:59                                2024 

08:22:59   Outpatient                       SFA        [facility]        514                                    Mel[facility] Benitez

 

                                                    2024 

13:56:39                                2024 

13:56:39   Outpatient                       SFA        [facility]        

0501                                    [facility]

 

                                                    2024 

00:00:00                                2024 

00:00:00                                Outpatient 

Visit                                  [facility]          [id number]   [id number]

[id number]

[id number]

[id number]                                  [facility]

 

                                                    2023 

09:40:00                                2023 

10:16:31            Outpatient          R                   ERIKA ROSS           [facility]            [id number]      [facility]

 

                                                    2023 

09:40:00                                2023 

10:00:00                                Urgent 

Care                                                Erika Ross

Unknown, 

Attending                               [facility] 

MEDICAL 

OFFICE 

BUILDING                                1.2.840.114

350.1.13.10

4.2.7.2.686

[id number]

370                       [id number]                 [facility]

 

                                                    2023 

00:00:00                                2023 

00:00:00                                Letter 

(Out)                                               Erika Ross                                   [facility] 

MEDICAL 

OFFICE 

BUILDING                                1.2.840.114

350.1.13.10

4.2.7.2.686

[id number]

370                       [id number]                 [facility]

 

                                                    2023-11-15 

17:28:23                                2023-11-15 

17:28:23   Outpatient                       SFA        [facility]        [id number]

1115                                    [facility]

 

                                                    2023 

13:45:00                                2023 

13:45:00            Outpatient          R                   ROSIE JOSE CRAIG           [facility]            [id number]      [facility]

 

                                                    2023-10-25 

16:00:00                                2023-10-25 

16:00:00            Outpatient          R                   ROSIE JOSE 

ROSIE           [facility]            [id number]      [facility]

 

                                                    2023-10-11 

11:15:00                                2023-10-11 

11:15:00   Outpatient                       SFA        [facility]        

1011                                    [facility]

 

                                                    2023-10-11 

00:00:00                                2023-10-11 

00:00:00                                Orders 

Only                                                Doctor 

Unassigned, 

No Name                                 [facility]                                1..840.114

350.1.13.10

4.2.7.2.686

[id number]

009                       [id number]                 [facility]

 

                                                    2023 

10:41:18                                2023 

10:41:18   Outpatient                       [facility]        

[id number]                                    Melvin WOODWARD 

Emmanuel

 

                                                    2023 

10:00:00                                2023 

10:00:00            Outpatient          R                   VENKAT STOKES          [facility]            [id number]      [facility]

 

                                                    2023 

10:51:21                                2023 

10:51:21   Outpatient                       [facility]        

0831                                    [facility]

 

                                                    2023 

15:54:00                                2023 

23:59:00  Outpatient R         RADIOLOGY [facility]      [id number] [facility]

 

                                                    2023 

15:54:00                                2023 

23:59:00                                Hospital 

Encounter                               Radiology           [facility]                                  1..840.114

350.1.13.10

4.2.7.2.686

[id number]

801                       [id number]                 [facility]

 

                                                    2023 

00:00:00                                2023 

00:00:00                                Orders 

Only                                                Doctor 

Unassigned, 

No Name                                 [facility]                                1.2.840.114

350.1.13.10

4.2.7.2.686

[id number]

009                       [id number]                 [facility]

 

                                                    2023 

09:33:06                                2023 

09:33:06   Outpatient                       SFA        [facility]FA        [id number]

0812                                    [facility]

 

                                                    2023 

11:40:00                                2023 

13:05:00            Emergency           X                   MEGAN 

LAUREN         [facility]            ERT             [id number]      [facility]

 

                                                    2023 

11:40:00                                2023 

13:05:00            Emergency                               Lauren Metzger                               [facility]                                  1.2.840.114

350.1.13.10

4.2.7.2.686

[id number]

084                       [id number]                 [facility]

 

                                                    2023 

00:00:00                                2023 

00:00:00                                Case 

Management                                          Velvet Jolly                                   1.2.840.114

350.1.13.10

4.2.7.2.686

[phone]

086                       [id number]                 [facility]

 

                                                    2023-04-10 

08:00:00                                2023-04-10 

08:00:00            Outpatient          TERRI PAVON HOWARD          [facility]            [id number]      [facility]

 

                                                    2023 

13:30:00                                2023 

13:30:00            Outpatient          NAZARIO HOPPER           [facility]            [id number]      [facility]

 

                                                    2023 

12:30:00                                2023 

12:30:00            Outpatient          VIN MANNING         [facility]            [id number]      [facility]

 

                                                    2023 

16:12:13                                2023 

16:12:13   Outpatient                       SFA        [facility]        [id number]

[id number]                                    [facility]

 

                                                    2023 

13:59:00                                2023 

17:02:00            Emergency           X                   LEXII KATE          [facility]            ERT             [id number]      [facility]

 

                                                    2023 

13:59:00                                2023 

17:02:00            Emergency                               Lexii Kate                                   [facility]                                  1.2.840.114

350.1.13.10

4.2.7.2.686

[id number]

084                       [id number]                 [facility]

 

                                                    2023 

11:40:00                                2023 

12:00:00                                Urgent 

Care                                                Sheila Hightower

Unknown, 

Attending                               [facility] 

MEDICAL 

OFFICE 

BUILDING                                1.2.840.114

350.1.13.10

4.2.7.2.686

[id number]

370                       [id number]                 [facility]

 

                                                    2023 

11:40:00                                2023 

11:40:00            Outpatient          SHEILA GROVE          [facility]            [id number]      [facility]

 

                                                    2023 

00:00:00                                2023 

00:00:00                                Orders 

Only                                                Doctor 

Unassigned, 

No Name                                 [facility]                                1.2.840.114

350.1.13.10

4.2.7.2.686

[id number]

009                       [id number]                 [facility]

 

                                                    2023 

15:19:17                                2023 

15:19:17   Outpatient                       SFA        SFA        [id number]

[id number]                                    Melvin 

TRACE 

[facility]

 

                                                    2022-12-15 

13:24:56                                2022-12-15 

13:24:56   Outpatient                       SFA        SFA        

1215                                    Melvin 

TRACE 

[facility]

 

                                                    2022-10-21 

10:09:46                                2022-10-21 

10:09:46   Outpatient                       SFA        SFA        

1021                                    Melvin 

TRCAE 

[facility]

 

                                                    2022-10-21 

00:00:00                                2022-10-21 

00:00:00                                Outpatient 

Visit                                                       [id number]

[id number]

[id number]

[id number]                 [id number]                [id number]

[id number]

[id number]

[id number]                                  

 

                                                    2022-10-12 

11:06:30                                2022-10-12 

11:06:30   Outpatient                       SFA        SFA        [id number]

1012                                    [facility]

 

                                                    2022-10-12 

00:00:00                                2022-10-12 

00:00:00                                Outpatient 

Visit                                                       [id number]

[id number]

[id number]

[id number]                 [id number]                [id number]

[id number]

[id number]

[id number]                                  

 

                                                    2022-10-11 

12:00:00                                2022-10-11 

12:43:37            Outpatient          R                   JUANY BRANDT           [facility]            [id number]      [facility]

 

                                                    2022-10-11 

12:00:00                                2022-10-11 

12:20:00                                Urgent 

Care                                                Junay Brandt, 

Attending                               [facility] 

MEDICAL 

OFFICE 

BUILDING                                1.2.840.114

350.1.13.10

4.2.7.2.686

[id number]

370                       [id number]                  [facility]

 

                                                    2022 

11:00:00                                2022 

11:20:00                                Urgent 

Care                                                [facility] 

MEDICAL 

OFFICE 

BUILDING                                1.2.840.114

350.1.13.10

4.2.7.2.686

[id number]

370                       [id number]                  [facility]

 

                                                    2022 

11:00:00                                2022 

11:00:00            Outpatient          R                   SHEILA HIGHTOWER          [facility]            [id number]      [facility]

 

                                                    2022 

00:00:00                                2022 

00:00:00            Telephone                               Provider, 

Emmanuel Herrera 

Urgent Care                             [facility] 

MEDICAL 

OFFICE 

BUILDING                                1.2.840.114

350.1.13.10

4.2.7.2.686

[id number]

370                       [id number]                  [facility]

 

                                                    2022 

17:40:00                                2022 

18:26:36            Outpatient          R                   JUANY BRANDT           [facility]            [id number]      [facility]

 

                                                    2022 

17:40:00                                2022 

18:26:36                                Urgent 

Care                                                JordanJuany ayala

Glen 

Becky                                   [facility]

[facility] 

MEDICAL 

OFFICE 

BUILDING                                1.2.840.114

350.1.13.10

4.2.7.2.686

[id number]

370                       [id number]                  [facility]

 

                                                    2022 

17:40:00                                2022 

17:40:00            Outpatient          JUANY BECK           [facility]            [id number]      [facility]

 

                                                    2022 

14:45:00                                2022 

15:05:00                                Nurse 

Visit                                               Nurse, Ang 

Db Urgent 

Care

Unknown, 

Attending                               [facility] 

MED[facility] 

OFFICE 

BUILDING                                1..840.114

350.1.13.10

4.2.7.2.686

[id number]

370                       [id number]                  [facility]

 

                                                    2022 

14:45:00                                2022 

14:45:00            Outpatient          SAMY BRANDT 

ONELIAIA           [facility]            [id number]      [facility]

 

                                                    2022 

00:00:00                                2022 

00:00:00                                Letter 

(Out)                                               Stefani Vang                                 [facility]                                1.840.114

350.1.13.10

4.2.7.2.686

[phone]

019                       [id number]                  [facility]

 

                                                    2022 

13:40:00                                2022 

12:34:19            Outpatient          DOMINIK JOY        [facility]            [id number]      [facility]

 

                                                    2022 

00:00:00                                2022 

00:00:00                                Orders 

Only                                                Doctor 

Unassigned, 

No Name                                 [facility]                                1.840.114

350.1.13.10

4.2.7.2.686

[phone]

009                       [id number]                  [facility]

 

                                                    2022 

00:00:00                                2022 

00:00:00            Telephone                               Gt Belle                                    [facility] 

SPECIALTY 

CARE 

CENTER AT 

[facility]                                   1..840.114

350.1.13.10

4.2.7.2.686

[phone]

198                       [id number]                  [facility]

 

                                                    2022 

15:30:00                                2022 

15:30:00            Outpatient          GT TERRELL            [facility]            [id number]      [facility]

 

                                                    2022 

00:00:00                                2022 

00:00:00                                Patient 

Secure Msg                                          Doctor 

Unassigned, 

No Name                                 [facility]                                [id number]

[id number]

[id number]

[id number]

019                       [id number]                  [facility]

 

                                                    2022 

18:20:00                                2022 

18:29:53            Outpatient          SHEILA GROVE          [facility]            [id number]      [facility]

 

                                                    2022 

18:20:00                                2022 

18:29:53                                Urgent 

Care                                                Sheila Hightower 

[facility]?VITAL[facility]Y

[facility] 

MEDICAL 

OFFICE 

BUILDING                                [id number]

[id number]

[id number]

[id number]

370                       [id number]                  [facility]

 

                                                    2022 

18:20:00                                2022 

18:20:00            Outpatient          SAMY REED 

[facility]            [id number]      [facility]

 

                                                    2022 

18:20:00                                2022 

18:20:00            Outpatient          SHEILA GROVE          [facility]            [id number]      [facility]

 

                                                    2017-10-23 

00:00:00                                2017-10-23 

00:00:00  Outpatient                     [facility]       [id number] Located within [facility]

 

                                                    2017-10-12 

10:25:55                                2017-10-12 

10:25:55  Outpatient                     [facility]       [id number] Located within [facility]

 

                                                    2017 

10:39:05                                2017 

10:39:05  Outpatient                     [facility]       [id number] Located within [facility]

 

                                                    2017 

09:21:03                                2017 

09:21:03  Outpatient                     [facility]       [id number]  Located within [facility]







Results





                    Test Description Test Time Test Comments Results   Result Co

mments Source







                                        

 

                                        

 

                                        





Melvin Cormier HAND 3+ VW [id number] 15:54:22HISTORY: Left hand 
swelling. FINDINGS: AP, lateral, oblique views of left hand showed no acute 
fractureor dislocation. No significant changes of arthritis or aggressive 
bonelesions seen. No soft tissue calcifications or bony erosions orjuxta-
articular osteoporosis detected. CONCLUSIONS: Normal study.[facility], [id number] 00:00:00* 



                      Test Item  Value      Reference Range Interpretation Comme

nts

 

                                                    HERPES SIMPLEX AB, IgM (test

 code 

= 09258)        0.72 INDEX                                      





Melvin BenitezHERPES SIMPLEX 1/2 ANTIBODY, [id number] 00:00:00* 



                      Test Item  Value      Reference Range Interpretation Comme

nts

 

                                                    HERPES SIMPLEX 1 AB, IgG (te

st 

code = 13661)   0.021 INDEX                                     

 

                                                    HERPES SIMPLEX 2 AB, IgG (te

st 

code = 42401)   0.081 INDEX                                     





Melvin ParraPES SIMPLEX AB, [id number] 00:00:00* 



                      Test Item  Value      Reference Range Interpretation Comme

nts

 

                                                    HERPES SIMPLEX AB, IgM (test

 code 

= 67146)        0.72 INDEX                                      





Melvin BenitezHERPES SIMPLEX 1/2 ANTIBODY, [id number] 00:00:00* 



                      Test Item  Value      Reference Range Interpretation Comme

nts

 

                                                    HERPES SIMPLEX 1 AB, IgG (te

st 

code = 19094)   0.021 INDEX                                     

 

                                                    HERPES SIMPLEX 2 AB, IgG (te

st 

code = 55027)   0.081 INDEX                                     





Melvin BenitezVAGINAL PATHOGENS DNA [id number] 11:24:34* 



                      Test Item  Value      Reference Range Interpretation Comme

nts

 

                                                    TETO SPECIES 

(test code = 

57794)          NEGATIVE        NEGATIVE                        

 

                                                    G. VAGINALIS 

(test code = 

58452)          NEGATIVE        NEGATIVE                        

 

                                                    T. VAGINALIS 

(test code = 

97349)          NEGATIVE        NEGATIVE                        Note: The [facility]

ir VPIII 

Microbial Identification 

Testis a DNA probe test 

intended for use in the 

detectionand identification 

of Candida species, 

Gardnerellavaginalis and 

Trichomonas vaginalis 

nucleic acid. UNLESS 

OTHERWISE INDICATED, ALL 

TESTING PERFORMED AT 

[facility] [address] [place] [address] 

: 

PACHECO SUN M.D. CLIA 

NUMBER [id number] [place] 

[place]. [id number]





VAGINAL PATHOGENS DNA [id number] 00:00:00* 



                      Test Item  Value      Reference Range Interpretation Comme

nts

 

                      TETO SPECIES (test code = ) NEGATIVE              

           

 

                      G. VAGINALIS (test code = 80084) NEGATIVE                 

        

 

                      T. VAGINALIS (test code = 80105) NEGATIVE                 

        





Melvin WOODWARD AustinVAGINAL PATHOGENS DNA [id number] 00:00:00* 



                      Test Item  Value      Reference Range Interpretation Comme

nts

 

                      TETO SPECIES (test code = 04485) NEGATIVE              

           

 

                      G. VAGINALIS (test code = 06105) NEGATIVE                 

        

 

                      T. VAGINALIS (test code = 48724) NEGATIVE                 

        





Melvin F AustinVAGINAL PATHOGENS DNA [id number] 00:00:00* 



                      Test Item  Value      Reference Range Interpretation Comme

nts

 

                      TETO SPECIES (test code = 27757) NEGATIVE              

           

 

                      G. VAGINALIS (test code = 85219) NEGATIVE                 

        

 

                      T. VAGINALIS (test code = 95542) NEGATIVE                 

        





Melvin BenitezPOCT SARS-COV-2 ANTIGEN (BINAX NOW)2023 16:00:00* 



                      Test Item  Value      Reference Range Interpretation Comme

nts

 

                                                    POCT SARS-COV-2 ANTIGEN (jet

t code = 

75223-7)        Positive        Not Detected    A               

 

                                                    On board controls acceptable

 with C 

Line (test code = 3574) Yes                                             

 

                                                    Lab Interpretation (test cod

e = 

52093-0)        Abnormal                                        





[facility], [id number] 10:13:03SPECIMEN 
NUMBER: [id number] CULTURE, URINE SPECIMEN NUMBER: [id number] SPECIMEN COMMENT: 
URINE SOURCE: URINE REPORT STATUS: FINAL ISOLATE NUMBER 1: IDENTIFICATION: 
2023 ,000 CFU/ML STREPTOCOCCUS AGALACTIAE (GROUP B) ADDITIONAL 
OBSERVATIONS: PENICILLIN AND AMPICILLIN ARE DRUGS OF CHOICE FOR  TREATMENT OF B-
HEMOLYTIC STREPTOCOCCAL INFECTIONS. SUSCEPTIBILITY TESTING OF PENICILLIN AND 
OTHERB-LACTAMS APPROVED BY THE [place] [employer]OD AND DRUG  ADMINISTRATION FOR TREATMENT OF
B-HEMOLYTIC STREPTOCOCCAL INFECTIONS NEED NOT BE PERFORMED ROUTINELY. ADDITIONAL
OBSERVATIONS: 2023 ,000 CFU/MLMIXED MICROBIAL POPULATION PRESENT, NO
PREDOMINATING ORGANISMS;PROBABLE CONTAMINANTS. UNLESS OTHERWISE INDICATED, ALL 
TESTING PERFORMED AT [facility] [address] [place] [address] : PACHECO SUN M.D. CLIA NUMBER [id number] [facility]
[place]. [id number], [id number] 00:00:00* 



                      Test Item  Value      Reference Range Interpretation Comme

nts

 

                                                    CULTURE, URINE (test 

code = 00392)                           SPECIMEN NUMBER: 

[id number]                                                   





Melvin BenitezCULTURE, [id number] 00:00:00* 



                      Test Item  Value      Reference Range Interpretation Comme

nts

 

                                                    CULTURE, URINE (test 

code = 20841)                           SPECIMEN NUMBER: 

[id number]                                                   





Melvin BenitezCULTURE, [id number] 00:00:00* 



                      Test Item  Value      Reference Range Interpretation Comme

nts

 

                                                    CULTURE, URINE (test 

code = 05834)                           SPECIMEN NUMBER: 

[id number]                                                   





Melvin BenitezCULTURE, [id number] 00:00:00* 



                      Test Item  Value      Reference Range Interpretation Comme

nts

 

                                                    CULTURE, URINE (test 

code = 25714)                           SPECIMEN NUMBER: 

[id number]                                                   





Melvin BenitezPOCT [id number] 22:34:34* 



                      Test Item  Value      Reference Range Interpretation Comme

nts

 

                                                    POCT Creatinine (test code =

 

0312649747)     0.7 mg/dL       0.5-1.1                         

 

                                                    Lab Interpretation (test cod

e = 

22569-0)        Normal                                          





[facility]IT[facility]IN [facility], 25 [id number] 04:35:20* 



                      Test Item  Value      Reference Range Interpretation Comme

nts

 

                                                    VITAMIN D, 25 

OH (test code 

= 4958)         22 NG/ML        SEE BELOW       L               ***EFFECTIVE 2023, PLEASE 

NOTE NEW METHODOLOGY IS*** 

ELECTROCHEMILUMINESCENCE 

BINDING ASSAY. NOTE: 

25-HYDROXYVITAMIN D ASSAY 

INCLUDES 25-HYDROXYVITAMIN D2 

AND D3. ***** INTERPRETIVE 

RANGES *****PEDIATRIC (<17 

YEARS) . . . . . . . . . . . 

NG/ML 20-100ADULT: INSUFFICIENT 

. . . . . . . . . . . . . . 

NG/ML <20 SUBOPTIMAL . . . . . 

. . . . . . . . . . NG/ML 20-29 

OPTIMAL . . . . . . . . . . . . 

. . . . . NG/ML  UNLESS 

OTHERWISE INDICATED, ALL 

TESTING PERFORMED AT [facility] 

[address] [place] [address] 

: PACHECO SUN M.D. CLIA NUMBER 

[id number] [place] 

[place]. [id number]





TSH, THIRD [id number] 03:40:32* 



                      Test Item  Value      Reference Range Interpretation Comme

nts

 

                                                    TSH, THIRD GENERATION (test 

code 

= 2821)         1.890 UIU/ML    0.400-4.100                     





COMPREHENSIVE METABOLIC [id number] 02:56:17* 



                      Test Item  Value      Reference Range Interpretation Comme

nts

 

                                                    GLUCOSE (test code = 

2217)           95 MG/DL        70-99                           

 

                                                    BUN (test code = 

)           7 MG/DL         6-20                            

 

                                                    CREATININE (test 

code = )    0.85 MG/DL      0.60-1.30                       

 

                                                    eGFR ( CKD-EPI) 

(test code = 56089) 93 ML/MIN/1.73  >60                             

 

                                                    CALC BUN/CREAT (test 

code = 2235)    8 RATIO         6-28                            

 

                                                    SODIUM (test code = 

223)           142 MEQ/L       133-146                         

 

                                                    POTASSIUM (test code 

= 2228)         4.6 MEQ/L       3.5-5.4                         

 

                                                    CHLORIDE (test code 

= )         106 MEQ/L                                 

 

                                                    CARBON DIOXIDE (test 

code = )    24 MEQ/L        19-31                           

 

                                                    CALCIUM (test code = 

220)           9.2 MG/DL       8.5-10.5                        

 

                                                    PROTEIN, TOTAL (test 

code = 222)    6.3 G/DL        6.1-8.3                         

 

                                                    ALBUMIN (test code = 

2201)           4.5 G/DL        3.5-5.2                         

 

                                                    CALC GLOBULIN (test 

code = 2240)    1.8 G/DL        1.9-3.7         L               

 

                                                    CALC A/G RATIO (test 

code = 2234)    2.5 RATIO       1.0-2.6                         

 

                                                    BILIRUBIN, TOTAL 

(test code = 220) 0.3 MG/DL       See_Comment                     [Automated me

ssage] 

The system which 

generated this 

result transmitted 

reference range: 

<=1.2. The reference 

range was not used 

to interpret this 

result as 

normal/abnormal.

 

                                                    ALKALINE PHOSPHATASE 

(test code = 4) 85 U/L                                    

 

                                                    AST (test code = 

2218)           17 U/L          9-40                            

 

                                                    ALT (test code = 

2219)           20 U/L          5-40                            





LIPID [id number] 02:56:17* 



                      Test Item  Value      Reference Range Interpretation Comme

nts

 

                                                    CHOLESTEROL (test 

code = 2210)    150 MG/DL       <200                            

 

                                                    TRIGLYCERIDES (test 

code = 2232)    125 MG/DL       <150                            

 

                                                    HDL CHOLESTEROL (test 

code = 2220)    40 MG/DL        >39                             

 

                                                    CALC LDL CHOL (test 

code = 2237)    87 MG/DL        <100                            NOTE: CALCULATED

 LDL 

IS BASED ON 

TIMMY-RAMOS METHOD 

WHICHINCLUDES 

ADJUSTABLE 

TRIGLYCERIDE:VLDL 

CHOLESTEROL RATIO.THIS 

FACTOR VARIES BY 

MEASURED TRIGLYCERIDE 

AND NON-HDLCHOLESTEROL 

CONCENTRATIONS WITH 

INCREASED CALCULATED 

LDL SEENIN HIGHER 

TRIGLYCERIDE OR LOWER 

NON-HDL SPECIMENS. FOR 

MOREINFORMATION, SEE 

CLIENT ANNOUNCEMENT AT 

[email or web address]

[email or web address]

 

                                                    RISK RATIO LDL/HDL 

(test code = 2238) 2.18 RATIO      <3.22                           





COMPREHENSIVE METABOLIC [id number] 00:00:00* 



                      Test Item  Value      Reference Range Interpretation Comme

nts

 

                      GLUCOSE (test code = 2217) 95 MG/DL                       

  

 

                      BUN (test code = 2208) 7 MG/DL                          

 

                      CREATININE (test code = 2214) 0.85 MG/DL                  

     

 

                                                    eGFR ( CKD-EPI) (test co

de 

= 96123)        93 ML/MIN/1.73                                  

 

                                                    CALC BUN/CREAT (test code = 

2235)           8 RATIO                                         

 

                      SODIUM (test code = 2231) 142 MEQ/L                       

 

 

                      POTASSIUM (test code = 2228) 4.6 MEQ/L                    

    

 

                      CHLORIDE (test code = 2215) 106 MEQ/L                     

   

 

                                                    CARBON DIOXIDE (test code = 

2206)           24 MEQ/L                                        

 

                      CALCIUM (test code = 2209) 9.2 MG/DL                      

  

 

                                                    PROTEIN, TOTAL (test code = 

2229)           6.3 G/DL                                        

 

                      ALBUMIN (test code = 2201) 4.5 G/DL                       

  

 

                                                    CALC GLOBULIN (test code = 

2240)           1.8 G/DL                                        

 

                                                    CALC A/G RATIO (test code = 

2234)           2.5 RATIO                                       

 

                                                    BILIRUBIN, TOTAL (test code 

= 

2207)           0.3 MG/DL                                       

 

                                                    ALKALINE PHOSPHATASE (test 

code = 2204)    85 U/L                                          

 

                      AST (test code = 2218) 17 U/L                           

 

                      ALT (test code = 2219) 20 U/L                           





Melvin BenitezLIPID [id number] 00:00:00* 



                      Test Item  Value      Reference Range Interpretation Comme

nts

 

                      CHOLESTEROL (test code = 2210) 150 MG/DL                  

      

 

                      TRIGLYCERIDES (test code = 2232) 125 MG/DL                

        

 

                      HDL CHOLESTEROL (test code = 2220) 40 MG/DL               

          

 

                      CALC LDL CHOL (test code = 2237) 87 MG/DL                 

        

 

                                                    RISK RATIO LDL/HDL (test cod

e = 

2238)           2.18 RATIO                                      





Melvin Baker THIRD [id number] 00:00:00* 



                      Test Item  Value      Reference Range Interpretation Comme

nts

 

                                                    TSH, THIRD GENERATION (test 

code 

= 2821)         1.890 UIU/ML                                    





Melvin BenitezVITAMIN D, 25 [id number] 00:00:00* 



                      Test Item  Value      Reference Range Interpretation Comme

nts

 

                      VITAMIN D, 25 OH (test code = 4958) 22 NG/ML              

           





Melvin BenitezCOMPREHENSIVE METABOLIC [id number] 00:00:00* 



                      Test Item  Value      Reference Range Interpretation Comme

nts

 

                      GLUCOSE (test code = 2217) 95 MG/DL                       

  

 

                      BUN (test code = 2208) 7 MG/DL                          

 

                      CREATININE (test code = 2214) 0.85 MG/DL                  

     

 

                                                    eGFR ( CKD-EPI) (test co

de 

= 51330)        93 ML/MIN/1.73                                  

 

                                                    CALC BUN/CREAT (test code = 

2235)           8 RATIO                                         

 

                      SODIUM (test code = 2231) 142 MEQ/L                       

 

 

                      POTASSIUM (test code = 2228) 4.6 MEQ/L                    

    

 

                      CHLORIDE (test code = 2215) 106 MEQ/L                     

   

 

                                                    CARBON DIOXIDE (test code = 

2206)           24 MEQ/L                                        

 

                      CALCIUM (test code = 2209) 9.2 MG/DL                      

  

 

                                                    PROTEIN, TOTAL (test code = 

2229)           6.3 G/DL                                        

 

                      ALBUMIN (test code = 2201) 4.5 G/DL                       

  

 

                                                    CALC GLOBULIN (test code = 

2240)           1.8 G/DL                                        

 

                                                    CALC A/G RATIO (test code = 

2234)           2.5 RATIO                                       

 

                                                    BILIRUBIN, TOTAL (test code 

= 

2207)           0.3 MG/DL                                       

 

                                                    ALKALINE PHOSPHATASE (test 

code = 2204)    85 U/L                                          

 

                      AST (test code = 2218) 17 U/L                           

 

                      ALT (test code = 2219) 20 U/L                           





Melvin BenitezLIPID [id number] 00:00:00* 



                      Test Item  Value      Reference Range Interpretation Comme

nts

 

                      CHOLESTEROL (test code = 2210) 150 MG/DL                  

      

 

                      TRIGLYCERIDES (test code = 2232) 125 MG/DL                

        

 

                      HDL CHOLESTEROL (test code = 2220) 40 MG/DL               

          

 

                      CALC LDL CHOL (test code = 2237) 87 MG/DL                 

        

 

                                                    RISK RATIO LDL/HDL (test cod

e = 

2238)           2.18 RATIO                                      





Melvin Baker THIRD [id number] 00:00:00* 



                      Test Item  Value      Reference Range Interpretation Comme

nts

 

                                                    TSH, THIRD GENERATION (test 

code 

= 2821)         1.890 UIU/ML                                    





Melvin BenitezVITAMIN D, 25 [id number] 00:00:00* 



                      Test Item  Value      Reference Range Interpretation Comme

nts

 

                      VITAMIN D, 25 OH (test code = 4958) 22 NG/ML              

           





Melvin BenitezCOMPREHENSIVE METABOLIC [id number] 00:00:00* 



                      Test Item  Value      Reference Range Interpretation Comme

nts

 

                      GLUCOSE (test code = 2217) 95 MG/DL                       

  

 

                      BUN (test code = 2208) 7 MG/DL                          

 

                      CREATININE (test code = 2214) 0.85 MG/DL                  

     

 

                                                    eGFR ( CKD-EPI) (test co

de 

= 90099)        93 ML/MIN/1.73                                  

 

                                                    CALC BUN/CREAT (test code = 

2235)           8 RATIO                                         

 

                      SODIUM (test code = 2231) 142 MEQ/L                       

 

 

                      POTASSIUM (test code = 2228) 4.6 MEQ/L                    

    

 

                      CHLORIDE (test code = 2215) 106 MEQ/L                     

   

 

                                                    CARBON DIOXIDE (test code = 

2206)           24 MEQ/L                                        

 

                      CALCIUM (test code = 2209) 9.2 MG/DL                      

  

 

                                                    PROTEIN, TOTAL (test code = 

2229)           6.3 G/DL                                        

 

                      ALBUMIN (test code = 2201) 4.5 G/DL                       

  

 

                                                    CALC GLOBULIN (test code = 

2240)           1.8 G/DL                                        

 

                                                    CALC A/G RATIO (test code = 

2234)           2.5 RATIO                                       

 

                                                    BILIRUBIN, TOTAL (test code 

= 

2207)           0.3 MG/DL                                       

 

                                                    ALKALINE PHOSPHATASE (test 

code = 2204)    85 U/L                                          

 

                      AST (test code = 2218) 17 U/L                           

 

                      ALT (test code = 2219) 20 U/L                           





Melvin BenitezLIPID [id number] 00:00:00* 



                      Test Item  Value      Reference Range Interpretation Comme

nts

 

                      CHOLESTEROL (test code = 2210) 150 MG/DL                  

      

 

                      TRIGLYCERIDES (test code = 2232) 125 MG/DL                

        

 

                      HDL CHOLESTEROL (test code = 2220) 40 MG/DL               

          

 

                      CALC LDL CHOL (test code = 2237) 87 MG/DL                 

        

 

                                                    RISK RATIO LDL/HDL (test cod

e = 

2238)           2.18 RATIO                                      





Melvin BenitezTSH, THIRD [id number] 00:00:00* 



                      Test Item  Value      Reference Range Interpretation Comme

nts

 

                                                    TSH, THIRD GENERATION (test 

code 

= 2821)         1.890 UIU/ML                                    





Melvin BenitezVITAMIN D, 25 [id number] 00:00:00* 



                      Test Item  Value      Reference Range Interpretation Comme

nts

 

                      VITAMIN D, 25 OH (test code = 4958) 22 NG/ML              

           





Melvin BenitezCOMPREHENSIVE METABOLIC [id number] 00:00:00* 



                      Test Item  Value      Reference Range Interpretation Comme

nts

 

                      GLUCOSE (test code = 2217) 95 MG/DL                       

  

 

                      BUN (test code = 2208) 7 MG/DL                          

 

                      CREATININE (test code = 2214) 0.85 MG/DL                  

     

 

                                                    eGFR ( CKD-EPI) (test co

de 

= 91774)        93 ML/MIN/1.73                                  

 

                                                    CALC BUN/CREAT (test code = 

2235)           8 RATIO                                         

 

                      SODIUM (test code = 2231) 142 MEQ/L                       

 

 

                      POTASSIUM (test code = 2228) 4.6 MEQ/L                    

    

 

                      CHLORIDE (test code = 2215) 106 MEQ/L                     

   

 

                                                    CARBON DIOXIDE (test code = 

2206)           24 MEQ/L                                        

 

                      CALCIUM (test code = 2209) 9.2 MG/DL                      

  

 

                                                    PROTEIN, TOTAL (test code = 

2229)           6.3 G/DL                                        

 

                      ALBUMIN (test code = 2201) 4.5 G/DL                       

  

 

                                                    CALC GLOBULIN (test code = 

2240)           1.8 G/DL                                        

 

                                                    CALC A/G RATIO (test code = 

2234)           2.5 RATIO                                       

 

                                                    BILIRUBIN, TOTAL (test code 

= 

2207)           0.3 MG/DL                                       

 

                                                    ALKALINE PHOSPHATASE (test 

code = 2204)    85 U/L                                          

 

                      AST (test code = 2218) 17 U/L                           

 

                      ALT (test code = 2219) 20 U/L                           





Melvin BenitezLIPID [id number] 00:00:00* 



                      Test Item  Value      Reference Range Interpretation Comme

nts

 

                      CHOLESTEROL (test code = 2210) 150 MG/DL                  

      

 

                      TRIGLYCERIDES (test code = 2232) 125 MG/DL                

        

 

                      HDL CHOLESTEROL (test code = 2220) 40 MG/DL               

          

 

                      CALC LDL CHOL (test code = 2237) 87 MG/DL                 

        

 

                                                    RISK RATIO LDL/HDL (test cod

e = 

2238)           2.18 RATIO                                      





Melvin BenitezTSH, THIRD [id number] 00:00:00* 



                      Test Item  Value      Reference Range Interpretation Comme

nts

 

                                                    TSH, THIRD GENERATION (test 

code 

= 2821)         1.890 UIU/ML                                    





Melvin BenitezVITAMIN D, 25 [id number] 00:00:00* 



                      Test Item  Value      Reference Range Interpretation Comme

nts

 

                      VITAMIN D, 25 OH (test code = 4958) 22 NG/ML              

           





Melvin WOODWARD [facility] W/AUTO DIFF WITH [id number] 02:26:21* 



                      Test Item  Value      Reference Range Interpretation Comme

nts

 

                                                    WBC (test code = 

1001)           8.3 K/UL        3.5-11.0                        

 

                                                    RBC (test code = 

1002)           4.31 M/UL       3.80-5.40                       

 

                                                    HEMOGLOBIN (test code 

= 1003)         14.3 G/DL       11.5-15.5                       

 

                                                    HEMATOCRIT (test code 

= 1004)         41.4 %          34.0-45.0                       

 

                                                    MCV (test code = 

1005)           96.1 fL         80.0-99.0                       

 

                                                    MCH (test code = 

1006)           33.2 PG         25.0-33.0       H               

 

                                                    MCHC (test code = 

1007)           34.5 G/DL       31.0-36.0                       

 

                                                    RDW (test code = 

1038)           12.4 %          11.5-15.0                       

 

                                                    NEUTROPHILS (test 

code = 1008)    61.4 %                                          

 

                                                    LYMPHOCYTES (test 

code = 1010)    27.4 %                                          

 

                                                    MONOCYTES (test code 

= 1011)         8.2 %                                           

 

                                                    EOSINOPHILS (test 

code = 1012)    2.7 %                                           

 

                                                    BASOPHILS (test code 

= 1013)         0.1 %                                           

 

                                                    IMMATURE GRANULOCYTES 

(test code = 1036) 0.2 %                                           

 

                                                    NUCLEATED RBCS (test 

code = 1065)                            0.0  /100 

WBC'S               See_Comment                             [Automated message] 

The system which 

generated this 

result transmitted 

reference range: 

0.0. The reference 

range was not used 

to interpret this 

result as 

normal/abnormal.

 

                                                    PLATELET COUNT (test 

code = 1015)    279 K/UL        130-400                         

 

                                                    ABSOLUTE NEUTROPHILS 

(test code = 1066) 5.07 K/UL       1.50-7.50                       

 

                                                    ABSOLUTE LYMPHOCYTES 

(test code = 1067) 2.26 K/UL       1.00-4.00                       

 

                                                    ABSOLUTE MONOCYTES 

(test code = 1068) 0.68 K/UL       0.20-1.00                       

 

                                                    ABSOLUTE EOSINOPHILS 

(test code = 1040) 0.22 K/UL       0.00-0.50                       

 

                                                    ABSOLUTE BASOPHILS 

(test code = 1069) 0.01 K/UL       0.00-0.20                       

 

                                                    ABS IMMATURE 

GRANULOCYTES (test 

code = 1020)    0.02 K/UL       0.00-0.10                       

 

                                                    ABS NUCLEATED RBCS 

(test code = 29939) 0.00 K/UL       0.00-0.11                       





CBC W/AUTO [id number] 00:00:00* 



                      Test Item  Value      Reference Range Interpretation Comme

nts

 

                      WBC (test code = 1001) 8.3 K/UL                         

 

                      RBC (test code = 1002) 4.31 M/UL                        

 

                      HEMOGLOBIN (test code = 1003) 14.3 G/DL                   

     

 

                      HEMATOCRIT (test code = 1004) 41.4 %                      

     

 

                      MCV (test code = 1005) 96.1 fL                          

 

                      MCH (test code = 1006) 33.2 PG                          

 

                      MCHC (test code = 1007) 34.5 G/DL                        

 

                      RDW (test code = 1038) 12.4 %                           

 

                      NEUTROPHILS (test code = 1008) 61.4 %                     

      

 

                      LYMPHOCYTES (test code = 1010) 27.4 %                     

      

 

                      MONOCYTES (test code = 1011) 8.2 %                        

    

 

                      EOSINOPHILS (test code = 1012) 2.7 %                      

      

 

                      BASOPHILS (test code = 1013) 0.1 %                        

    

 

                                                    IMMATURE GRANULOCYTES (test 

code = 1036)    0.2 %                                           

 

                                                    NUCLEATED RBCS (test code = 

1065)           0.0 /100WBC'S                                   

 

                                                    PLATELET COUNT (test code = 

1015)           279 K/UL                                        

 

                                                    ABSOLUTE NEUTROPHILS (test c

ode 

= 1066)         5.07 K/UL                                       

 

                                                    ABSOLUTE LYMPHOCYTES (test c

ode 

= 1067)         2.26 K/UL                                       

 

                                                    ABSOLUTE MONOCYTES (test cod

e = 

1068)           0.68 K/UL                                       

 

                                                    ABSOLUTE EOSINOPHILS (test c

ode 

= 1040)         0.22 K/UL                                       

 

                                                    ABSOLUTE BASOPHILS (test cod

e = 

1069)           0.01 K/UL                                       

 

                                                    ABS IMMATURE GRANULOCYTES (t

est 

code = 1020)    0.02 K/UL                                       

 

                                                    ABS NUCLEATED RBCS (test cod

e = 

52538)          0.00 K/UL                                       





Melvin BenitezCBC W/AUTO [id number] 00:00:00* 



                      Test Item  Value      Reference Range Interpretation Comme

nts

 

                      WBC (test code = 1001) 8.3 K/UL                         

 

                      RBC (test code = 1002) 4.31 M/UL                        

 

                      HEMOGLOBIN (test code = 1003) 14.3 G/DL                   

     

 

                      HEMATOCRIT (test code = 1004) 41.4 %                      

     

 

                      MCV (test code = 1005) 96.1 fL                          

 

                      MCH (test code = 1006) 33.2 PG                          

 

                      MCHC (test code = 1007) 34.5 G/DL                        

 

                      RDW (test code = 1038) 12.4 %                           

 

                      NEUTROPHILS (test code = 1008) 61.4 %                     

      

 

                      LYMPHOCYTES (test code = 1010) 27.4 %                     

      

 

                      MONOCYTES (test code = 1011) 8.2 %                        

    

 

                      EOSINOPHILS (test code = 1012) 2.7 %                      

      

 

                      BASOPHILS (test code = 1013) 0.1 %                        

    

 

                                                    IMMATURE GRANULOCYTES (test 

code = 1036)    0.2 %                                           

 

                                                    NUCLEATED RBCS (test code = 

1065)           0.0 /100WBC'S                                   

 

                                                    PLATELET COUNT (test code = 

1015)           279 K/UL                                        

 

                                                    ABSOLUTE NEUTROPHILS (test c

ode 

= 1066)         5.07 K/UL                                       

 

                                                    ABSOLUTE LYMPHOCYTES (test c

ode 

= 1067)         2.26 K/UL                                       

 

                                                    ABSOLUTE MONOCYTES (test cod

e = 

1068)           0.68 K/UL                                       

 

                                                    ABSOLUTE EOSINOPHILS (test c

ode 

= 1040)         0.22 K/UL                                       

 

                                                    ABSOLUTE BASOPHILS (test cod

e = 

1069)           0.01 K/UL                                       

 

                                                    ABS IMMATURE GRANULOCYTES (t

est 

code = 1020)    0.02 K/UL                                       

 

                                                    ABS NUCLEATED RBCS (test cod

e = 

98925)          0.00 K/UL                                       





Melvin WOODWARD [facility] W/AUTO [id number] 00:00:00* 



                      Test Item  Value      Reference Range Interpretation Comme

nts

 

                      WBC (test code = 1001) 8.3 K/UL                         

 

                      RBC (test code = 1002) 4.31 M/UL                        

 

                      HEMOGLOBIN (test code = 1003) 14.3 G/DL                   

     

 

                      HEMATOCRIT (test code = 1004) 41.4 %                      

     

 

                      MCV (test code = 1005) 96.1 fL                          

 

                      MCH (test code = 1006) 33.2 PG                          

 

                      MCHC (test code = 1007) 34.5 G/DL                        

 

                      RDW (test code = 1038) 12.4 %                           

 

                      NEUTROPHILS (test code = 1008) 61.4 %                     

      

 

                      LYMPHOCYTES (test code = 1010) 27.4 %                     

      

 

                      MONOCYTES (test code = 1011) 8.2 %                        

    

 

                      EOSINOPHILS (test code = 1012) 2.7 %                      

      

 

                      BASOPHILS (test code = 1013) 0.1 %                        

    

 

                                                    IMMATURE GRANULOCYTES (test 

code = 1036)    0.2 %                                           

 

                                                    NUCLEATED RBCS (test code = 

1065)           0.0 /100WBC'S                                   

 

                                                    PLATELET COUNT (test code = 

1015)           279 K/UL                                        

 

                                                    ABSOLUTE NEUTROPHILS (test c

ode 

= 1066)         5.07 K/UL                                       

 

                                                    ABSOLUTE LYMPHOCYTES (test c

ode 

= 1067)         2.26 K/UL                                       

 

                                                    ABSOLUTE MONOCYTES (test cod

e = 

1068)           0.68 K/UL                                       

 

                                                    ABSOLUTE EOSINOPHILS (test c

ode 

= 1040)         0.22 K/UL                                       

 

                                                    ABSOLUTE BASOPHILS (test cod

e = 

1069)           0.01 K/UL                                       

 

                                                    ABS IMMATURE GRANULOCYTES (t

est 

code = 1020)    0.02 K/UL                                       

 

                                                    ABS NUCLEATED RBCS (test cod

e = 

81307)          0.00 K/UL                                       





Melvin BenitezCBC W/AUTO [id number] 00:00:00* 



                      Test Item  Value      Reference Range Interpretation Comme

nts

 

                      WBC (test code = 1001) 8.3 K/UL                         

 

                      RBC (test code = 1002) 4.31 M/UL                        

 

                      HEMOGLOBIN (test code = 1003) 14.3 G/DL                   

     

 

                      HEMATOCRIT (test code = 1004) 41.4 %                      

     

 

                      MCV (test code = 1005) 96.1 fL                          

 

                      MCH (test code = 1006) 33.2 PG                          

 

                      MCHC (test code = 1007) 34.5 G/DL                        

 

                      RDW (test code = 1038) 12.4 %                           

 

                      NEUTROPHILS (test code = 1008) 61.4 %                     

      

 

                      LYMPHOCYTES (test code = 1010) 27.4 %                     

      

 

                      MONOCYTES (test code = 1011) 8.2 %                        

    

 

                      EOSINOPHILS (test code = 1012) 2.7 %                      

      

 

                      BASOPHILS (test code = 1013) 0.1 %                        

    

 

                                                    IMMATURE GRANULOCYTES (test 

code = 1036)    0.2 %                                           

 

                                                    NUCLEATED RBCS (test code = 

1065)           0.0 /100WBC'S                                   

 

                                                    PLATELET COUNT (test code = 

1015)           279 K/UL                                        

 

                                                    ABSOLUTE NEUTROPHILS (test c

ode 

= 1066)         5.07 K/UL                                       

 

                                                    ABSOLUTE LYMPHOCYTES (test c

ode 

= 1067)         2.26 K/UL                                       

 

                                                    ABSOLUTE MONOCYTES (test cod

e = 

1068)           0.68 K/UL                                       

 

                                                    ABSOLUTE EOSINOPHILS (test c

ode 

= 1040)         0.22 K/UL                                       

 

                                                    ABSOLUTE BASOPHILS (test cod

e = 

1069)           0.01 K/UL                                       

 

                                                    ABS IMMATURE GRANULOCYTES (t

est 

code = 1020)    0.02 K/UL                                       

 

                                                    ABS NUCLEATED RBCS (test cod

e = 

77101)          0.00 K/UL                                       





Melvin BenitezARTHRITIS [id number] 00:26:57* 



                      Test Item  Value      Reference Range Interpretation Comme

nts

 

                                                    RHEUMATOID FACTOR, 

QUANT (test code = 

3502)           <10 IU/ML       <14                             

 

                                                    URIC ACID (test 

code = 2233)    3.5 MG/DL       2.7-6.1                         

 

                                                    SEDIMENTATION RATE 

(test code = 1017)                      TEST NOT 

PERFORMED 

MM/HOUR             0-20                                    Unable to perform 

testing, specimen not 

received.Charges 

adjusted as 

applicable.

 

                                                    ANTI-NUCLEAR 

ANTIBODIES (test 

code = 3506)    NEGATIVE        NEGATIVE                        

 

                                                    SUSU PATTERN 

(REPORTED AS TITER) 

(test code = 27685) SEE BELOW                                       

 

                                                    HOMOGENEOUS (test 

code = 47974)   NEGATIVE TITER  NEGATIVE                        

 

                                                    SPECKLED (test code 

= 628434)       NEGATIVE TITER  NEGATIVE                        

 

                                                    DENSE FINE SPECKLED 

(test code = 74488) NEGATIVE TITER  NEGATIVE                        

 

                                                    CENTROMERE (test 

code = 703842)  NEGATIVE TITER  NEGATIVE                        

 

                                                    COARSE SPECKLED 

(test code = 

176336)         NEGATIVE TITER  NEGATIVE                        

 

                                                    DISCRETE NUCLEAR 

DOTS (test code = 

142872)         NEGATIVE TITER  NEGATIVE                        

 

                                                    NUCLEOLAR (test 

code = 448417)  NEGATIVE TITER  NEGATIVE                        

 

                                                    NUCLEAR MEMBRANE 

(test code = 

303627)         NEGATIVE TITER  NEGATIVE                        

 

                                                    CYTO. RETICULAR 

(PASCUAL) (test code = 

770564)         NEGATIVE        NEGATIVE                        

 

                                                    COMMENTS (test code 

= 505924)       NONE                                            

 

                                                    METHOD (test code = 

19279)          (NOTE)                                          NOTE: EFFECTIVE 

2022, METHOD IS 

TRANSITIONED TO THE 

[employer] IFA PLATFORM. 

THE METHOD INCLUDES A 

SCREENTHRESHOLD OF 

1:80, DIGITIZED AND 

COMPUTER 

ALGORITHM-ASSISTEDINT

ERPRETATION OF TITERS 

AND DIGITAL PATTERNS, 

AND HEp-2 CELLLINE 

SUBSTRATE. ADDITIONAL 

UNUSUAL PATTERNS WILL 

BE GIVEN ASCOMMENTS. 

FOR MORE INFORMATION, 

SEE 

[email or web address]

esting





VITAMIN D,1,11-[id number] 16:11:55* 



                      Test Item  Value      Reference Range Interpretation Comme

nts

 

                                                    VITAMIN 

D,1,25-DIHYDROXY 

(test code = 4960) 52.0 PG/ML      20.0-82.0                       This assay is

 primarily 

indicated for evaluation 

of hypercalcemia and for 

assessing patients with 

renal failure. A normal 

result does not exclude 

Vitamin D deficiency. To 

assess for Vitamin D 

deficiency, consider 

25-Hydroxy Vitamin D 

assay. *** [facility] has 

important pathology 

staff changes effective 

2023. *** New 

pathology staff will 

provide uninterrupted, 

excellent patient care 

and clinical 

consultation. See URL: 

[email or web address]

[email or web address]. UNLESS OTHERWISE 

INDICATED, ALL TESTING 

PERFORMED AT [facility] [address] [place] [address]  

: 

PACHECO SUN M.D. 

CLIA NUMBER [id number] 

CAP ACCREDITATION NO. 

[id number]





VITAMIN D,1,54-[id number] 00:00:00* 



                      Test Item  Value      Reference Range Interpretation Comme

william

 

                                                    VITAMIN D,1,25-DIHYDROXY (te

st 

code = 4960)    52.0 PG/ML                                      





Melvin BenitezVITAMIN D,1,74-[id number] 00:00:00* 



                      Test Item  Value      Reference Range Interpretation Comme

william

 

                                                    VITAMIN D,1,25-DIHYDROXY (te

st 

code = 4960)    52.0 PG/ML                                      





Melvin BenitezVITAMIN D,1,95-[id number] 00:00:00* 



                      Test Item  Value      Reference Range Interpretation Comme

william

 

                                                    VITAMIN D,1,25-DIHYDROXY (te

st 

code = 4960)    52.0 PG/ML                                      





Melvin WOODWARD AustinVITAMIN D,1,61-[id number] 00:00:00* 



                      Test Item  Value      Reference Range Interpretation Comme

william

 

                                                    VITAMIN D,1,25-DIHYDROXY (te

st 

code = 4960)    52.0 PG/ML                                      





Melvin BenitezARTHRITIS [id number] 00:00:00* 



                      Test Item  Value      Reference Range Interpretation Comme

william

 

                                                    RHEUMATOID FACTOR, QUANT 

(test code = 3502) <10 IU/ML                                       

 

                                                    URIC ACID (test code = 

2233)           3.5 MG/DL                                       

 

                                                    SEDIMENTATION RATE (test 

code = 1017)                            TEST NOT PERFORMED 

MM/HOUR                                                     

 

                                                    ANTI-NUCLEAR ANTIBODIES 

(test code = 3506) NEGATIVE                                        

 

                                                    SUSU PATTERN (REPORTED AS 

TITER) (test code = 

85446)          SEE BELOW                                       

 

                                                    HOMOGENEOUS (test code = 

37974)          NEGATIVE TITER                                  

 

                                                    SPECKLED (test code = 

649637)         NEGATIVE TITER                                  

 

                                                    DENSE FINE SPECKLED (test 

code = 29399)   NEGATIVE TITER                                  

 

                                                    CENTROMERE (test code = 

061209)         NEGATIVE TITER                                  

 

                                                    COARSE SPECKLED (test 

code = 464409)  NEGATIVE TITER                                  

 

                                                    DISCRETE NUCLEAR DOTS 

(test code = 586302) NEGATIVE TITER                                  

 

                                                    NUCLEOLAR (test code = 

529121)         NEGATIVE TITER                                  

 

                                                    NUCLEAR MEMBRANE (test 

code = 993064)  NEGATIVE TITER                                  

 

                                                    CYTO. RETICULAR (PASCUAL) 

(test code = 257063) NEGATIVE                                        

 

                                                    COMMENTS (test code = 

584558)         NONE                                            

 

                                                    METHOD (test code = 

46607)          (NOTE)                                          





Melvin WOODWARD AustinARTHRITIS [id number] 00:00:00* 



                      Test Item  Value      Reference Range Interpretation Comme

nts

 

                                                    RHEUMATOID FACTOR, QUANT 

(test code = 3502) <10 IU/ML                                       

 

                                                    URIC ACID (test code = 

2233)           3.5 MG/DL                                       

 

                                                    SEDIMENTATION RATE (test 

code = 1017)                            TEST NOT PERFORMED 

MM/HOUR                                                     

 

                                                    ANTI-NUCLEAR ANTIBODIES 

(test code = 3506) NEGATIVE                                        

 

                                                    SUSU PATTERN (REPORTED AS 

TITER) (test code = 

45710)          SEE BELOW                                       

 

                                                    HOMOGENEOUS (test code = 

60931)          NEGATIVE TITER                                  

 

                                                    SPECKLED (test code = 

567585)         NEGATIVE TITER                                  

 

                                                    DENSE FINE SPECKLED (test 

code = 35398)   NEGATIVE TITER                                  

 

                                                    CENTROMERE (test code = 

158912)         NEGATIVE TITER                                  

 

                                                    COARSE SPECKLED (test 

code = 495046)  NEGATIVE TITER                                  

 

                                                    DISCRETE NUCLEAR DOTS 

(test code = 584381) NEGATIVE TITER                                  

 

                                                    NUCLEOLAR (test code = 

039982)         NEGATIVE TITER                                  

 

                                                    NUCLEAR MEMBRANE (test 

code = 802975)  NEGATIVE TITER                                  

 

                                                    CYTO. RETICULAR (PASCUAL) 

(test code = 135933) NEGATIVE                                        

 

                                                    COMMENTS (test code = 

385977)         NONE                                            

 

                                                    METHOD (test code = 

32154)          (NOTE)                                          





Melvin WOODWARD AustinARTHRITIS [id number] 00:00:00* 



                      Test Item  Value      Reference Range Interpretation Comme

nts

 

                                                    RHEUMATOID FACTOR, QUANT 

(test code = 3502) <10 IU/ML                                       

 

                                                    URIC ACID (test code = 

2233)           3.5 MG/DL                                       

 

                                                    SEDIMENTATION RATE (test 

code = 1017)                            TEST NOT PERFORMED 

MM/HOUR                                                     

 

                                                    ANTI-NUCLEAR ANTIBODIES 

(test code = 3506) NEGATIVE                                        

 

                                                    SUSU PATTERN (REPORTED AS 

TITER) (test code = 

72677)          SEE BELOW                                       

 

                                                    HOMOGENEOUS (test code = 

31746)          NEGATIVE TITER                                  

 

                                                    SPECKLED (test code = 

103619)         NEGATIVE TITER                                  

 

                                                    DENSE FINE SPECKLED (test 

code = 73314)   NEGATIVE TITER                                  

 

                                                    CENTROMERE (test code = 

799752)         NEGATIVE TITER                                  

 

                                                    COARSE SPECKLED (test 

code = 422260)  NEGATIVE TITER                                  

 

                                                    DISCRETE NUCLEAR DOTS 

(test code = 364685) NEGATIVE TITER                                  

 

                                                    NUCLEOLAR (test code = 

498469)         NEGATIVE TITER                                  

 

                                                    NUCLEAR MEMBRANE (test 

code = 419820)  NEGATIVE TITER                                  

 

                                                    CYTO. RETICULAR (PASCUAL) 

(test code = 581271) NEGATIVE                                        

 

                                                    COMMENTS (test code = 

529530)         NONE                                            

 

                                                    METHOD (test code = 

15750)          (NOTE)                                          





Melvin WOODWARD AustinARTHRITIS [id number] 00:00:00* 



                      Test Item  Value      Reference Range Interpretation Comme

nts

 

                                                    RHEUMATOID FACTOR, QUANT 

(test code = 3502) <10 IU/ML                                       

 

                                                    URIC ACID (test code = 

2233)           3.5 MG/DL                                       

 

                                                    SEDIMENTATION RATE (test 

code = 1017)                            TEST NOT PERFORMED 

MM/HOUR                                                     

 

                                                    ANTI-NUCLEAR ANTIBODIES 

(test code = 3506) NEGATIVE                                        

 

                                                    SUSU PATTERN (REPORTED AS 

TITER) (test code = 

23374)          SEE BELOW                                       

 

                                                    HOMOGENEOUS (test code = 

53951)          NEGATIVE TITER                                  

 

                                                    SPECKLED (test code = 

546374)         NEGATIVE TITER                                  

 

                                                    DENSE FINE SPECKLED (test 

code = 41494)   NEGATIVE TITER                                  

 

                                                    CENTROMERE (test code = 

228911)         NEGATIVE TITER                                  

 

                                                    COARSE SPECKLED (test 

code = 642016)  NEGATIVE TITER                                  

 

                                                    DISCRETE NUCLEAR DOTS 

(test code = 875669) NEGATIVE TITER                                  

 

                                                    NUCLEOLAR (test code = 

256154)         NEGATIVE TITER                                  

 

                                                    NUCLEAR MEMBRANE (test 

code = 472163)  NEGATIVE TITER                                  

 

                                                    CYTO. RETICULAR (PASCUAL) 

(test code = 301054) NEGATIVE                                        

 

                                                    COMMENTS (test code = 

042791)         NONE                                            

 

                                                    METHOD (test code = 

90727)          (NOTE)                                          





Melvin WOODWARD AustinPOCT PREGNANCY [id number] 19:56:00* 



                      Test Item  Value      Reference Range Interpretation Comme

nts

 

                      POCT PREG (test code = 1605) Negative                     

    

 

                                                    On board controls acceptable

 with 

C Line (test code = 3574) Present                                         

 

                      POCT PREG LOT # (test code = 3575) [id number]                 

          

 

                                                    POCT PREG TEST  DATE (

test 

code = 3576)    2024                                      

 

                                                    Lab Interpretation (test cod

e = 

06800-4)        Normal                                          





[facility] [id number] 15:44:19SPECIMEN 
NUMBER: [id number] CULTURE, URINE SPECIMEN NUMBER: [id number] SPECIMEN COMMENT: 
URINE SOURCE: URINE REPORT STATUS: FINAL ISOLATE NUMBER 1: IDENTIFICATION: 
2022 ,000 CFU/ML BETA-STREPTOCOCCUS GROUP B ADDITIONAL OBSERVATIONS:
PENICILLIN AND AMPICILLIN ARE DRUGS OF CHOICE FOR TREATMENT OF B-HEMOLYTIC 
STREPTOCOCCAL INFECTIONS. SUSCEPTIBILITY TESTING OF PENICILLIN AND OTHER 
B-LACTAMS APPROVED BY THE [employer] FOR TREATMENT OF B-
HEMOLYTIC STREPTOCOCCAL INFECTIONS NEED NOT BE PERFORMED ROUTINELY. ADDITIONAL 
OBSERVATIONS: 2022 10-50,000 CFU/ML UROGENITALFLORA PRESENT NO COMMON 
PATHOGENS UNLESS OTHERWISE INDICATED, ALL TESTING PERFORMED [facility] [address] [place] [address] : CHARLES HUNT M.D. CLIA NUMBER [id number] [place]. [id number], 
[id number] 00:00:00* 



                      Test Item  Value      Reference Range Interpretation Comme

nts

 

                                                    CULTURE, URINE (test 

code = 85325)                           SPECIMEN NUMBER: 

[id number]                                                   





Melvin Weeks, [id number] 00:00:00* 



                      Test Item  Value      Reference Range Interpretation Comme

nts

 

                                                    CULTURE, URINE (test 

code = 06985)                           SPECIMEN NUMBER: 

[id number]                                                   





Melvin Weeks, [id number] 00:00:00* 



                      Test Item  Value      Reference Range Interpretation Comme

nts

 

                                                    CULTURE, URINE (test 

code = 73758)                           SPECIMEN NUMBER: 

[id number]                                                   





Melvin Weeks, [id number] 00:00:00* 



                      Test Item  Value      Reference Range Interpretation Comme

nts

 

                                                    CULTURE, URINE (test 

code = 06278)                           SPECIMEN NUMBER: 

[id number]                                                   





Melvin WOODWARD AustinVAGINAL PATHOGENS DNA [id number] 00:00:00* 



                      Test Item  Value      Reference Range Interpretation Comme

nts

 

                      TETO SPECIES (test code = 58329) NEGATIVE              

           

 

                      G. VAGINALIS (test code = 11065) NEGATIVE                 

        

 

                      T. VAGINALIS (test code = 81811) NEGATIVE                 

        





Melvin WOODWARD AustinVAGINAL PATHOGENS DNA [id number] 00:00:00* 



                      Test Item  Value      Reference Range Interpretation Comme

nts

 

                      TETO SPECIES (test code = 78254) NEGATIVE              

           

 

                      G. VAGINALIS (test code = 10818) NEGATIVE                 

        

 

                      T. VAGINALIS (test code = 99356) NEGATIVE                 

        





Melvin WOODWARD AustinVAGINAL PATHOGENS DNA [id number] 00:00:00* 



                      Test Item  Value      Reference Range Interpretation Comme

nts

 

                      TETO SPECIES (test code = 52549) NEGATIVE              

           

 

                      G. VAGINALIS (test code = 07377) NEGATIVE                 

        

 

                      T. VAGINALIS (test code = 95073) NEGATIVE                 

        





Melvin WOODWARD AustinVAGINAL PATHOGENS DNA [id number] 00:00:00* 



                      Test Item  Value      Reference Range Interpretation Comme

nts

 

                      TETO SPECIES (test code = 33706) NEGATIVE              

           

 

                      G. VAGINALIS (test code = 77969) NEGATIVE                 

        

 

                      T. VAGINALIS (test code = 27633) NEGATIVE                 

        





Melvin WOODWARD AustinVAGINAL PATHOGENS DNA PANEL2022-10-22 14:58:13* 



                      Test Item  Value      Reference Range Interpretation Comme

nts

 

                                                    TETO SPECIES (test 

code = 35310)   NEGATIVE        NEGATIVE                        

 

                                                    G. VAGINALIS (test 

code = 45752)   NEGATIVE        NEGATIVE                        

 

                                                    T. VAGINALIS (test 

code = 46779)   NEGATIVE        NEGATIVE                        UNLESS OTHERWISE

 

INDICATED, ALL TESTING 

[facility]MED [facility] [address] [place] [address] 

: 

CHARLES HUNT M.D. 

CLIA NUMBER [id number] 

CAP ACCREDITATION NO. 

[id number]





VAGINAL PATHOGENS DNA PANEL2022-10-22 00:00:00* 



                      Test Item  Value      Reference Range Interpretation Comme

nts

 

                      TETO SPECIES (test code = 46266) NEGATIVE              

           

 

                      G. VAGINALIS (test code = 47427) NEGATIVE                 

        

 

                      T. VAGINALIS (test code = 62341) NEGATIVE                 

        





Melvin WOODWARD AustinVAGINAL PATHOGENS DNA PANEL2022-10-22 00:00:00* 



                      Test Item  Value      Reference Range Interpretation Comme

nts

 

                      TETO SPECIES (test code = 79234) NEGATIVE              

           

 

                      G. VAGINALIS (test code = 89580) NEGATIVE                 

        

 

                      T. VAGINALIS (test code = 42133) NEGATIVE                 

        





Melvin WOODWARD AustinVAGINAL PATHOGENS DNA PANEL2022-10-22 00:00:00* 



                      Test Item  Value      Reference Range Interpretation Comme

nts

 

                      TETO SPECIES (test code = 09285) NEGATIVE              

           

 

                      G. VAGINALIS (test code = 71358) NEGATIVE                 

        

 

                      T. VAGINALIS (test code = 30514) NEGATIVE                 

        





Melvin WOODWARD AustinVAGINAL PATHOGENS DNA PANEL2022-10-22 00:00:00* 



                      Test Item  Value      Reference Range Interpretation Comme

nts

 

                      TETO SPECIES (test code = 33042) NEGATIVE              

           

 

                      G. VAGINALIS (test code = 80726) NEGATIVE                 

        

 

                      T. VAGINALIS (test code = 82148) NEGATIVE                 

        





Melvin BenitezCULTONEIL, [id number] 15:31:58SPECIMEN NUMBER: [id number] 
CULTURE, URINE SPECIMEN NUMBER: [id number] SPECIMEN COMMENT: URINE SOURCE: URINE 
REPORT STATUS: FINAL ISOLATE NUMBER 1: IDENTIFICATION: 10/14/2022 10-50,000 
CFU/ML BETA-STREPTOCOCCUS GROUP B ADDITIONAL OBSERVATIONS: PENICILLIN AND 
AMPICILLIN ARE DRUGS OF CHOICE FOR TREATMENT OF B-HEMOLYTIC STREPTOCOCCAL 
INFECTIONS. SUSCEPTIBILITY TESTING OF PENICILLIN AND OTHER B-LACTAMSAPPROVED BY 
THE [employer] FOR TREATMENT OF B-HEMOLYTIC STREPTOCOCCAL 
INFECTIONS NEED NOT BE PERFORMED ROUTINELY. UNLESS OTHERWISE INDICATED, ALL 
TESTING PERFORMED ATCLINICAL PATHOLOGY [facility] [address] [place] [address] : CHARLES HUNT M.D.CLIA NUMBER [id number] [place]. [id number], URINE2022-10-14 00:00:00* 



                      Test Item  Value      Reference Range Interpretation Comme

nts

 

                                                    CULTURE, URINE (test 

code = 16271)                           SPECIMEN NUMBER: 

[id number]                                                   





Melvin Weeks, URINE2022-10-14 00:00:00* 



                      Test Item  Value      Reference Range Interpretation Comme

nts

 

                                                    CULTURE, URINE (test 

code = 32895)                           SPECIMEN NUMBER: 

[id number]                                                   





Melvin Weeks, URINE2022-10-14 00:00:00* 



                      Test Item  Value      Reference Range Interpretation Comme

nts

 

                                                    CULTURE, URINE (test 

code = 43512)                           SPECIMEN NUMBER: 

[id number]                                                   





Melvin Wekes, URINE2022-10-14 00:00:00* 



                      Test Item  Value      Reference Range Interpretation Comme

nts

 

                                                    CULTURE, URINE (test 

code = 08390)                           SPECIMEN NUMBER: 

[id number]                                                   





Melvin Weeks, URINE2022-10-14 00:00:00* 



                      Test Item  Value      Reference Range Interpretation Comme

nts

 

                                                    CULTURE, URINE (test 

code = 85886)                           SPECIMEN NUMBER: 

[id number]                                                   





CULTURE, [id number] 09:22:37SPECIMEN NUMBER: [id number] CULTURE, URINE 
SPECIMEN NUMBER: [id number] SPECIMEN COMMENT: URINE SOURCE: URINE REPORT STATUS: 
FINAL ISOLATE NUMBER 1: IDENTIFICATION: 2022 >100,000 CFU/ML BETA-STR
EPTOCOCCUS GROUP B ADDITIONAL OBSERVATIONS: PENICILLIN AND AMPICILLIN ARE DRUGS 
OF CHOICE FOR TREATMENT OF B-HEMOLYTIC STREPTOCOCCAL INFECTIONS. SUSCEPTIBILITY 
TESTING OF PENICILLIN AND OTHER B-LACTAMS APPROVED BY THE [employer] 
[employer]MIN[employer]RATION FOR TREATMENT OF B-HEMOLYTIC STREPTOCOCCAL INFECTIONS NEED NOT BE
PERFORMED ROUTINELY. ADDITIONAL OBSERVATIONS: 2022 >100,000 CFU/ML MIXED 
UROGENITAL FRANDY UNLESS OTHERWISE INDICATED, ALL TESTING PERFORMED [facility]CL[facility] [address] [place] [address] : 
CHARLES HUNT M.D. [place] NUMBER [id number] [facility] [place]. [id number]
CULTURE, [id number] 00:00:00* 



                      Test Item  Value      Reference Range Interpretation Comme

nts

 

                                                    CULTURE, URINE (test 

code = 16331)                           SPECIMEN NUMBER: 

[id number]                                                   





Melvin Weeks, [id number] 00:00:00* 



                      Test Item  Value      Reference Range Interpretation Comme

nts

 

                                                    CULTURE, URINE (test 

code = 88176)                           SPECIMEN NUMBER: 

[id number]                                                   





Melvin AdkinsLTURE, [id number] 00:00:00* 



                      Test Item  Value      Reference Range Interpretation Comme

nts

 

                                                    CULTURE, URINE (test 

code = 67859)                           SPECIMEN NUMBER: 

[id number]                                                   





Melvin Weeks, [id number] 00:00:00* 



                      Test Item  Value      Reference Range Interpretation Comme

nts

 

                                                    CULTURE, URINE (test 

code = 11103)                           SPECIMEN NUMBER: 

[id number]                                                   





Melvin Weeks, [id number] 00:00:00* 



                      Test Item  Value      Reference Range Interpretation Comme

nts

 

                                                    CULTURE, URINE (test 

code = 15841)                           SPECIMEN NUMBER: 

[id number]                                                   





CULTURE, [id number] 00:00:00* 



                      Test Item  Value      Reference Range Interpretation Comme

nts

 

                                                    CULTURE, URINE (test 

code = 07373)                           SPECIMEN NUMBER: 

[id number]                                                   





CT/NG, NAAT, [id number] 18:58:33* 



                      Test Item  Value      Reference Range Interpretation Comme

nts

 

                                                    GONORRHEA, NAAT 

(test code = 

35069)          NEGATIVE        NEGATIVE                        *** IMPORTANT NO

ISIDRO: SEE 

ANNOUNCEMENT AT *** 

[email or web address]

[email or web address] Note: Assay 

methodology is nucleic acid 

amplification by 

transcription mediated 

amplification (TMA) 

utilizing the Aptima Combo 

2 Assay.

 

                                                    CHLAMYDIA, NAAT 

(test code = 

39833)          NEGATIVE        NEGATIVE                        *** IMPORTANT NO

ISIDRO: SEE 

ANNOUNCEMENT AT *** 

[email or web address]

[email or web address] Note: Assay 

methodology is nucleic acid 

amplification by 

transcription mediated 

amplification (TMA) 

utilizing the Aptima Combo 

2 Assay.





VAGINAL PATHOGENS DNA [id number] 15:36:52* 



                      Test Item  Value      Reference Range Interpretation Comme

nts

 

                      TETO SPECIES (test code = 30670) NEGATIVE   NEGATIVE   

           

 

                      G. VAGINALIS (test code = 67833) NEGATIVE   NEGATIVE      

        

 

                      T. VAGINALIS (test code = 76407) NEGATIVE   NEGATIVE      

        





[id number] 03:41:31* 



                      Test Item  Value      Reference Range Interpretation Comme

nts

 

                                                    RPR RESULT (test code = 

3501)           NON-REACTIVE    NON-REACTIVE                    

 

                      RPR TITER (test code = 3500) NOT INDIC. TITER NOT INDIC.  

          





HIV 1/2 4TH GEN, RFLX [id number] 03:33:35* 



                      Test Item  Value      Reference Range Interpretation Comme

nts

 

                                                    HIV 1/2 4TH GEN, RFLX CONF (

test 

code = 3514)    NON-REACTIVE    NON-REACTIVE                    





HEPATITIS PANEL, [id number] 03:33:35* 



                      Test Item  Value      Reference Range Interpretation Comme

nts

 

                                                    HEPATITIS A IgM (test 

code = 03500)   NON-REACTIVE    NON-REACTIVE                    

 

                                                    HEPATITIS B CORE IgM 

(test code = 4644) NON-REACTIVE    NON-REACTIVE                    

 

                                                    HEPATITIS B SURF AG 

(test code = 2739) NON-REACTIVE    NON-REACTIVE                    

 

                                                    HEPATITIS C ANTIBODY 

(test code = 4675) NON-REACTIVE    NON-REACTIVE                    

 

                                                    INTERPRETATION 

HEPATITIS A: (test 

code = 2552)    (NOTE)                                          Hepatitis A sero

logy 

shows no evidence of 

acute hepatitis A.

 

                                                    INTERPRETATION 

HEPATITIS B: (test 

code = 34877)   (NOTE)                                          Hepatitis B sero

logy 

shows no evidence of 

acute hepatitis B 

andno indication of 

exposure to 

hepatitis B virus in 

the previous malcolm 

eight months.

 

                                                    INTERPRETATION 

HEPATITIS C: (test 

code = 77203)   (NOTE)                                          Hepatitis C sero

logy 

shows no evidence of 

exposure to 

hepatitisC virus at 

this time. It can 

take up to 12 months 

after exposure tothe 

hepatitis C virus 

for antibodies to 

become detectable in 

the blood in certain 

patients. UNLESS 

OTHERWISE INDICATED, 

ALL TESTING 

[facility] 

[address] [place] [address] LABORATORY 

DIRECTOR: CHARLES HUNT M.D.  

CLIA NUMBER 

[id number] CAP 

ACCREDITATION NO. 

[id number]





GC AND CHLAMYDIA, AMPLIFIED, [id number] 00:00:00* 



                      Test Item  Value      Reference Range Interpretation Comme

nts

 

                      GONORRHEA, NAAT (test code = 52828) NEGATIVE              

           

 

                      CHLAMYDIA, NAAT (test code = 21299) NEGATIVE              

           





Melvin BenitezHIV AB/AG COMBO RFLX [id number] 00:00:00* 



                      Test Item  Value      Reference Range Interpretation Comme

nts

 

                                                    HIV 1/2 4TH GEN, RFLX CONF (

test 

code = 3514)    NON-REACTIVE                                    





Melvin WOODWARD [id number] 00:00:00* 



                      Test Item  Value      Reference Range Interpretation Comme

nts

 

                                                    RPR RESULT (test code = 

3501)           NON-REACTIVE                                    

 

                      RPR TITER (test code = 3500) NOT INDIC. TITER             

          





Melvin BenitezACUTE HEPATITIS [id number] 00:00:00* 



                      Test Item  Value      Reference Range Interpretation Comme

nts

 

                                                    HEPATITIS A IgM (test code =

 

97442)          NON-REACTIVE                                    

 

                                                    HEPATITIS B CORE IgM (test c

ode 

= 4644)         NON-REACTIVE                                    

 

                                                    HEPATITIS B SURF AG (test co

de = 

2739)           NON-REACTIVE                                    

 

                                                    HEPATITIS C ANTIBODY (test c

ode 

= 4675)         NON-REACTIVE                                    

 

                                                    INTERPRETATION HEPATITIS A: 

(test code = 2552) (NOTE)                                          

 

                                                    INTERPRETATION HEPATITIS B: 

(test code = 59263) (NOTE)                                          

 

                                                    INTERPRETATION HEPATITIS C: 

(test code = 75833) (NOTE)                                          





Melvin BenitezVAGINAL PATHOGENS DNA [id number] 00:00:00* 



                      Test Item  Value      Reference Range Interpretation Comme

nts

 

                      TETO SPECIES (test code = ) NEGATIVE              

           

 

                      G. VAGINALIS (test code = ) NEGATIVE                 

        

 

                      T. VAGINALIS (test code = ) NEGATIVE                 

        





Melvin BenitezGC AND CHLAMYDIA, AMPLIFIED, [id number] 00:00:00* 



                      Test Item  Value      Reference Range Interpretation Comme

nts

 

                      GONORRHEA, NAAT (test code = 78999) NEGATIVE              

           

 

                      CHLAMYDIA, NAAT (test code = 31506) NEGATIVE              

           





Melvin BenitezHIV AB/AG COMBO RFLX [id number] 00:00:00* 



                      Test Item  Value      Reference Range Interpretation Comme

nts

 

                                                    HIV 1/2 4TH GEN, RFLX CONF (

test 

code = 3514)    NON-REACTIVE                                    





Melvin [id number] 00:00:00* 



                      Test Item  Value      Reference Range Interpretation Comme

nts

 

                                                    RPR RESULT (test code = 

3501)           NON-REACTIVE                                    

 

                      RPR TITER (test code = 3500) NOT INDIC. TITER             

          





Melvin BenitezACUTE HEPATITIS [id number] 00:00:00* 



                      Test Item  Value      Reference Range Interpretation Comme

nts

 

                                                    HEPATITIS A IgM (test code =

 

54466)          NON-REACTIVE                                    

 

                                                    HEPATITIS B CORE IgM (test c

ode 

= 4644)         NON-REACTIVE                                    

 

                                                    HEPATITIS B SURF AG (test co

de = 

2739)           NON-REACTIVE                                    

 

                                                    HEPATITIS C ANTIBODY (test c

ode 

= 4675)         NON-REACTIVE                                    

 

                                                    INTERPRETATION HEPATITIS A: 

(test code = 2552) (NOTE)                                          

 

                                                    INTERPRETATION HEPATITIS B: 

(test code = 49240) (NOTE)                                          

 

                                                    INTERPRETATION HEPATITIS C: 

(test code = 29826) (NOTE)                                          





Melvin BenitezVAGINAL PATHOGENS DNA [id number] 00:00:00* 



                      Test Item  Value      Reference Range Interpretation Comme

nts

 

                      TETO SPECIES (test code = ) NEGATIVE              

           

 

                      G. VAGINALIS (test code = 94772) NEGATIVE                 

        

 

                      T. VAGINALIS (test code = 59458) NEGATIVE                 

        





Melvin BenitezGC AND CHLAMYDIA, AMPLIFIED, [id number] 00:00:00* 



                      Test Item  Value      Reference Range Interpretation Comme

nts

 

                      GONORRHEA, NAAT (test code = 44516) NEGATIVE              

           

 

                      CHLAMYDIA, NAAT (test code = 12310) NEGATIVE              

           





Melvin WOODWARD AustinHIV AB/AG COMBO RFLX [id number] 00:00:00* 



                      Test Item  Value      Reference Range Interpretation Comme

nts

 

                                                    HIV 1/2 4TH GEN, RFLX CONF (

test 

code = 3514)    NON-REACTIVE                                    





Melvin WOODWARD [id number] 00:00:00* 



                      Test Item  Value      Reference Range Interpretation Comme

nts

 

                                                    RPR RESULT (test code = 

3501)           NON-REACTIVE                                    

 

                      RPR TITER (test code = 3500) NOT INDIC. TITER             

          





Melvin BenitezACUTE HEPATITIS [id number] 00:00:00* 



                      Test Item  Value      Reference Range Interpretation Comme

nts

 

                                                    HEPATITIS A IgM (test code =

 

97386)          NON-REACTIVE                                    

 

                                                    HEPATITIS B CORE IgM (test c

ode 

= 4644)         NON-REACTIVE                                    

 

                                                    HEPATITIS B SURF AG (test co

de = 

2739)           NON-REACTIVE                                    

 

                                                    HEPATITIS C ANTIBODY (test c

ode 

= 4675)         NON-REACTIVE                                    

 

                                                    INTERPRETATION HEPATITIS A: 

(test code = 2552) (NOTE)                                          

 

                                                    INTERPRETATION HEPATITIS B: 

(test code = 87023) (NOTE)                                          

 

                                                    INTERPRETATION HEPATITIS C: 

(test code = 72317) (NOTE)                                          





Melvin BenitezVAGINAL PATHOGENS DNA [id number] 00:00:00* 



                      Test Item  Value      Reference Range Interpretation Comme

nts

 

                      TETO SPECIES (test code = ) NEGATIVE              

           

 

                      G. VAGINALIS (test code = 39949) NEGATIVE                 

        

 

                      T. VAGINALIS (test code = 96714) NEGATIVE                 

        





Melvin WOODWARD AustinGC AND CHLAMYDIA, AMPLIFIED, [id number] 00:00:00* 



                      Test Item  Value      Reference Range Interpretation Comme

nts

 

                      GONORRHEA, NAAT (test code = 01534) NEGATIVE              

           

 

                      CHLAMYDIA, NAAT (test code = 41869) NEGATIVE              

           





Melvin WOODWARD AustinHIV AB/AG COMBO RFLX [id number] 00:00:00* 



                      Test Item  Value      Reference Range Interpretation Comme

nts

 

                                                    HIV 1/2 4TH GEN, RFLX CONF (

test 

code = 3514)    NON-REACTIVE                                    





Melvin WOODWARD [id number] 00:00:00* 



                      Test Item  Value      Reference Range Interpretation Comme

nts

 

                                                    RPR RESULT (test code = 

3501)           NON-REACTIVE                                    

 

                      RPR TITER (test code = 3500) NOT INDIC. TITER             

          





Melvin BenitezACUTE HEPATITIS [id number] 00:00:00* 



                      Test Item  Value      Reference Range Interpretation Comme

nts

 

                                                    HEPATITIS A IgM (test code =

 

40451)          NON-REACTIVE                                    

 

                                                    HEPATITIS B CORE IgM (test c

ode 

= 4644)         NON-REACTIVE                                    

 

                                                    HEPATITIS B SURF AG (test co

de = 

2739)           NON-REACTIVE                                    

 

                                                    HEPATITIS C ANTIBODY (test c

ode 

= 4675)         NON-REACTIVE                                    

 

                                                    INTERPRETATION HEPATITIS A: 

(test code = 2552) (NOTE)                                          

 

                                                    INTERPRETATION HEPATITIS B: 

(test code = 77779) (NOTE)                                          

 

                                                    INTERPRETATION HEPATITIS C: 

(test code = 35543) (NOTE)                                          





Melvin BenitezVAGINAL PATHOGENS DNA [id number] 00:00:00* 



                      Test Item  Value      Reference Range Interpretation Comme

nts

 

                      TETO SPECIES (test code = 21529) NEGATIVE              

           

 

                      G. VAGINALIS (test code = 11064) NEGATIVE                 

        

 

                      T. VAGINALIS (test code = 00124) NEGATIVE                 

        





Melvin BenitezVAGINAL PATHOGENS DNA [id number] 00:00:00* 



                      Test Item  Value      Reference Range Interpretation Comme

nts

 

                      TETO SPECIES (test code = 11355) NEGATIVE              

           

 

                      G. VAGINALIS (test code = 61544) NEGATIVE                 

        

 

                      T. VAGINALIS (test code = 96864) NEGATIVE                 

        





GC AND CHLAMYDIA, AMPLIFIED, [id number] 00:00:00* 



                      Test Item  Value      Reference Range Interpretation Comme

nts

 

                      GONORRHEA, NAAT (test code = 76480) NEGATIVE              

           

 

                      CHLAMYDIA, NAAT (test code = 16692) NEGATIVE              

           





HIV AB/AG COMBO RFLX [id number] 00:00:00* 



                      Test Item  Value      Reference Range Interpretation Comme

nts

 

                                                    HIV 1/2 4TH GEN, RFLX CONF (

test 

code = 3514)    NON-REACTIVE                                    





[id number] 00:00:00* 



                      Test Item  Value      Reference Range Interpretation Comme

nts

 

                                                    RPR RESULT (test code = 

3501)           NON-REACTIVE                                    

 

                      RPR TITER (test code = 3500) NOT INDIC. TITER             

          





ACUTE HEPATITIS [id number] 00:00:00* 



                      Test Item  Value      Reference Range Interpretation Comme

nts

 

                                                    HEPATITIS A IgM (test code =

 

02214)          NON-REACTIVE                                    

 

                                                    HEPATITIS B CORE IgM (test c

ode 

= 4644)         NON-REACTIVE                                    

 

                                                    HEPATITIS B SURF AG (test co

de = 

2739)           NON-REACTIVE                                    

 

                                                    HEPATITIS C ANTIBODY (test c

ode 

= 4675)         NON-REACTIVE                                    

 

                                                    INTERPRETATION HEPATITIS A: 

(test code = 2552) (NOTE)                                          

 

                                                    INTERPRETATION HEPATITIS B: 

(test code = 57517) (NOTE)                                          

 

                                                    INTERPRETATION HEPATITIS C: 

(test code = 34705) (NOTE)                                          





VAGINAL PATHOGENS DNA [id number] 00:00:00* 



                      Test Item  Value      Reference Range Interpretation Comme

nts

 

                      TETO SPECIES (test code = 23184) NEGATIVE              

           

 

                      G. VAGINALIS (test code = 28465) NEGATIVE                 

        

 

                      T. VAGINALIS (test code = 61444) NEGATIVE                 

        





GC AND CHLAMYDIA, AMPLIFIED, [id number] 00:00:00* 



                      Test Item  Value      Reference Range Interpretation Comme

nts

 

                      GONORRHEA, NAAT (test code = 46385) NEGATIVE              

           

 

                      CHLAMYDIA, NAAT (test code = 74825) NEGATIVE              

           





HIV AB/AG COMBO RFLX [id number] 00:00:00* 



                      Test Item  Value      Reference Range Interpretation Comme

nts

 

                                                    HIV 1/2 4TH GEN, RFLX CONF (

test 

code = 3514)    NON-REACTIVE                                    





[id number] 00:00:00* 



                      Test Item  Value      Reference Range Interpretation Comme

nts

 

                                                    RPR RESULT (test code = 

3501)           NON-REACTIVE                                    

 

                      RPR TITER (test code = 3500) NOT INDIC. TITER             

          





ACUTE HEPATITIS [id number] 00:00:00* 



                      Test Item  Value      Reference Range Interpretation Comme

nts

 

                                                    HEPATITIS A IgM (test code =

 

11552)          NON-REACTIVE                                    

 

                                                    HEPATITIS B CORE IgM (test c

ode 

= 4644)         NON-REACTIVE                                    

 

                                                    HEPATITIS B SURF AG (test co

de = 

2739)           NON-REACTIVE                                    

 

                                                    HEPATITIS C ANTIBODY (test c

ode 

= 4675)         NON-REACTIVE                                    

 

                                                    INTERPRETATION HEPATITIS A: 

(test code = 2552) (NOTE)                                          

 

                                                    INTERPRETATION HEPATITIS B: 

(test code = 68471) (NOTE)                                          

 

                                                    INTERPRETATION HEPATITIS C: 

(test code = 16578) (NOTE)                                          





COMPREHENSIVE METABOLIC [id number] 00:00:00* 



                      Test Item  Value      Reference Range Interpretation Comme

nts

 

                      GLUCOSE (test code = 2217) 84 MG/DL                       

  

 

                      BUN (test code = 2208) 7 MG/DL                          

 

                      CREATININE (test code = 2214) 0.65 MG/DL                  

     

 

                                                    eGFR ( CKD-EPI) (test 

code = 05392)   121 ML/MIN/1.73                                 

 

                                                    CALC BUN/CREAT (test code = 

2235)           11 RATIO                                        

 

                      SODIUM (test code = 2231) 139 MEQ/L                       

 

 

                      POTASSIUM (test code = 2228) 4.5 MEQ/L                    

    

 

                      CHLORIDE (test code = 2215) 104 MEQ/L                     

   

 

                                                    CARBON DIOXIDE (test code = 

2206)           24 MEQ/L                                        

 

                      CALCIUM (test code = 2209) 9.2 MG/DL                      

  

 

                                                    PROTEIN, TOTAL (test code = 

2229)           6.7 G/DL                                        

 

                      ALBUMIN (test code = 2201) 4.4 G/DL                       

  

 

                                                    CALC GLOBULIN (test code = 

2240)           2.3 G/DL                                        

 

                                                    CALC A/G RATIO (test code = 

2234)           1.9 RATIO                                       

 

                                                    BILIRUBIN, TOTAL (test code 

= 

2207)           0.3 MG/DL                                       

 

                                                    ALKALINE PHOSPHATASE (test 

code = 2204)    101 U/L                                         

 

                      AST (test code = 2218) 19 U/L                           

 

                      ALT (test code = 2219) 17 U/L                           





Melvin WOODWARD EmmanuelLIPID [id number] 00:00:00* 



                      Test Item  Value      Reference Range Interpretation Comme

nts

 

                      CHOLESTEROL (test code = 2210) 136 MG/DL                  

      

 

                      TRIGLYCERIDES (test code = 2232) 158 MG/DL                

        

 

                      HDL CHOLESTEROL (test code = 2220) 34 MG/DL               

          

 

                      CALC LDL CHOL (test code = 2237) 77 MG/DL                 

        

 

                                                    RISK RATIO LDL/HDL (test cod

e = 

2238)           2.26 RATIO                                      





Melvin [id number] 00:00:00* 



                      Test Item  Value      Reference Range Interpretation Comme

nts

 

                                                    TSH, THIRD GENERATION (test 

code 

= 2821)         0.970 UIU/ML                                    





Melvin WOODWARD EmmanuelCBC W/AUTO [id number] 00:00:00* 



                      Test Item  Value      Reference Range Interpretation Comme

nts

 

                      WBC (test code = 1001) 7.7 K/UL                         

 

                      RBC (test code = 1002) 4.48 M/UL                        

 

                      HEMOGLOBIN (test code = 1003) 14.1 G/DL                   

     

 

                      HEMATOCRIT (test code = 1004) 41.3 %                      

     

 

                      MCV (test code = 1005) 92.2 fL                          

 

                      MCH (test code = 1006) 31.5 PG                          

 

                      MCHC (test code = 1007) 34.1 G/DL                        

 

                      RDW (test code = 1038) 12.9 %                           

 

                      NEUTROPHILS (test code = 1008) 57.6 %                     

      

 

                      LYMPHOCYTES (test code = 1010) 32.8 %                     

      

 

                      MONOCYTES (test code = 1011) 7.0 %                        

    

 

                      EOSINOPHILS (test code = 1012) 2.2 %                      

      

 

                      BASOPHILS (test code = 1013) 0.1 %                        

    

 

                                                    IMMATURE GRANYLOCYTES (test 

code = 1036)    0.3 %                                           

 

                                                    NUCLEATED RBCS (test code = 

1065)           0.0 /100WBC'S                                   

 

                                                    PLATELET COUNT (test code = 

1015)           299 K/UL                                        

 

                                                    ABSOLUTE NEUTROPHILS (test c

ode 

= 1066)         4.45 K/UL                                       

 

                                                    ABSOLUTE LYMPHOCYTES (test c

ode 

= 1067)         2.53 K/UL                                       

 

                                                    ABSOLUTE MONOCYTES (test cod

e = 

1068)           0.54 K/UL                                       

 

                                                    ABSOLUTE EOSINOPHILS (test c

ode 

= 1040)         0.17 K/UL                                       

 

                                                    ABSOLUTE BASOPHILS (test cod

e = 

1069)           0.01 K/UL                                       

 

                                                    ABS IMMATURE GRANULOCYTES (t

est 

code = 1020)    0.02 K/UL                                       

 

                                                    ABS NUCLEATED RBCS (test cod

e = 

53706)          0.00 K/UL                                       





Melvin BenitezCOMPREHENSIVE METABOLIC [id number] 00:00:00* 



                      Test Item  Value      Reference Range Interpretation Comme

nts

 

                      GLUCOSE (test code = 2217) 84 MG/DL                       

  

 

                      BUN (test code = 2208) 7 MG/DL                          

 

                      CREATININE (test code = 2214) 0.65 MG/DL                  

     

 

                                                    eGFR ( CKD-EPI) (test 

code = 67214)   121 ML/MIN/1.73                                 

 

                                                    CALC BUN/CREAT (test code = 

2235)           11 RATIO                                        

 

                      SODIUM (test code = 2231) 139 MEQ/L                       

 

 

                      POTASSIUM (test code = 2228) 4.5 MEQ/L                    

    

 

                      CHLORIDE (test code = 2215) 104 MEQ/L                     

   

 

                                                    CARBON DIOXIDE (test code = 

2206)           24 MEQ/L                                        

 

                      CALCIUM (test code = 2209) 9.2 MG/DL                      

  

 

                                                    PROTEIN, TOTAL (test code = 

2229)           6.7 G/DL                                        

 

                      ALBUMIN (test code = 2201) 4.4 G/DL                       

  

 

                                                    CALC GLOBULIN (test code = 

2240)           2.3 G/DL                                        

 

                                                    CALC A/G RATIO (test code = 

2234)           1.9 RATIO                                       

 

                                                    BILIRUBIN, TOTAL (test code 

= 

2207)           0.3 MG/DL                                       

 

                                                    ALKALINE PHOSPHATASE (test 

code = 2204)    101 U/L                                         

 

                      AST (test code = 2218) 19 U/L                           

 

                      ALT (test code = 2219) 17 U/L                           





Melvin BenitezLIPID [id number] 00:00:00* 



                      Test Item  Value      Reference Range Interpretation Comme

nts

 

                      CHOLESTEROL (test code = 2210) 136 MG/DL                  

      

 

                      TRIGLYCERIDES (test code = 2232) 158 MG/DL                

        

 

                      HDL CHOLESTEROL (test code = 2220) 34 MG/DL               

          

 

                      CALC LDL CHOL (test code = 2237) 77 MG/DL                 

        

 

                                                    RISK RATIO LDL/HDL (test cod

e = 

2238)           2.26 RATIO                                      





Melvin [id number] 00:00:00* 



                      Test Item  Value      Reference Range Interpretation Comme

nts

 

                                                    TSH, THIRD GENERATION (test 

code 

= 2821)         0.970 UIU/ML                                    





Melvin BenitezCBC W/AUTO [id number] 00:00:00* 



                      Test Item  Value      Reference Range Interpretation Comme

nts

 

                      WBC (test code = 1001) 7.7 K/UL                         

 

                      RBC (test code = 1002) 4.48 M/UL                        

 

                      HEMOGLOBIN (test code = 1003) 14.1 G/DL                   

     

 

                      HEMATOCRIT (test code = 1004) 41.3 %                      

     

 

                      MCV (test code = 1005) 92.2 fL                          

 

                      MCH (test code = 1006) 31.5 PG                          

 

                      MCHC (test code = 1007) 34.1 G/DL                        

 

                      RDW (test code = 1038) 12.9 %                           

 

                      NEUTROPHILS (test code = 1008) 57.6 %                     

      

 

                      LYMPHOCYTES (test code = 1010) 32.8 %                     

      

 

                      MONOCYTES (test code = 1011) 7.0 %                        

    

 

                      EOSINOPHILS (test code = 1012) 2.2 %                      

      

 

                      BASOPHILS (test code = 1013) 0.1 %                        

    

 

                                                    IMMATURE GRANYLOCYTES (test 

code = 1036)    0.3 %                                           

 

                                                    NUCLEATED RBCS (test code = 

1065)           0.0 /100WBC'S                                   

 

                                                    PLATELET COUNT (test code = 

1015)           299 K/UL                                        

 

                                                    ABSOLUTE NEUTROPHILS (test c

ode 

= 1066)         4.45 K/UL                                       

 

                                                    ABSOLUTE LYMPHOCYTES (test c

ode 

= 1067)         2.53 K/UL                                       

 

                                                    ABSOLUTE MONOCYTES (test cod

e = 

1068)           0.54 K/UL                                       

 

                                                    ABSOLUTE EOSINOPHILS (test c

ode 

= 1040)         0.17 K/UL                                       

 

                                                    ABSOLUTE BASOPHILS (test cod

e = 

1069)           0.01 K/UL                                       

 

                                                    ABS IMMATURE GRANULOCYTES (t

est 

code = 1020)    0.02 K/UL                                       

 

                                                    ABS NUCLEATED RBCS (test cod

e = 

94606)          0.00 K/UL                                       





Melvin BenitezCOMPREHENSIVE METABOLIC [id number] 00:00:00* 



                      Test Item  Value      Reference Range Interpretation Comme

nts

 

                      GLUCOSE (test code = 2217) 84 MG/DL                       

  

 

                      BUN (test code = 2208) 7 MG/DL                          

 

                      CREATININE (test code = 2214) 0.65 MG/DL                  

     

 

                                                    eGFR ( CKD-EPI) (test 

code = 51198)   121 ML/MIN/1.73                                 

 

                                                    CALC BUN/CREAT (test code = 

2235)           11 RATIO                                        

 

                      SODIUM (test code = 2231) 139 MEQ/L                       

 

 

                      POTASSIUM (test code = 2228) 4.5 MEQ/L                    

    

 

                      CHLORIDE (test code = 2215) 104 MEQ/L                     

   

 

                                                    CARBON DIOXIDE (test code = 

2206)           24 MEQ/L                                        

 

                      CALCIUM (test code = 2209) 9.2 MG/DL                      

  

 

                                                    PROTEIN, TOTAL (test code = 

2229)           6.7 G/DL                                        

 

                      ALBUMIN (test code = 2201) 4.4 G/DL                       

  

 

                                                    CALC GLOBULIN (test code = 

2240)           2.3 G/DL                                        

 

                                                    CALC A/G RATIO (test code = 

2234)           1.9 RATIO                                       

 

                                                    BILIRUBIN, TOTAL (test code 

= 

2207)           0.3 MG/DL                                       

 

                                                    ALKALINE PHOSPHATASE (test 

code = 2204)    101 U/L                                         

 

                      AST (test code = 2218) 19 U/L                           

 

                      ALT (test code = 2219) 17 U/L                           





Melvin BenitezLIPID [id number] 00:00:00* 



                      Test Item  Value      Reference Range Interpretation Comme

nts

 

                      CHOLESTEROL (test code = 2210) 136 MG/DL                  

      

 

                      TRIGLYCERIDES (test code = 2232) 158 MG/DL                

        

 

                      HDL CHOLESTEROL (test code = 2220) 34 MG/DL               

          

 

                      CALC LDL CHOL (test code = 2237) 77 MG/DL                 

        

 

                                                    RISK RATIO LDL/HDL (test cod

e = 

2238)           2.26 RATIO                                      





Melvin [id number] 00:00:00* 



                      Test Item  Value      Reference Range Interpretation Comme

nts

 

                                                    TSH, THIRD GENERATION (test 

code 

= 2821)         0.970 UIU/ML                                    





Melvin BenitezCBC W/AUTO [id number] 00:00:00* 



                      Test Item  Value      Reference Range Interpretation Comme

nts

 

                      WBC (test code = 1001) 7.7 K/UL                         

 

                      RBC (test code = 1002) 4.48 M/UL                        

 

                      HEMOGLOBIN (test code = 1003) 14.1 G/DL                   

     

 

                      HEMATOCRIT (test code = 1004) 41.3 %                      

     

 

                      MCV (test code = 1005) 92.2 fL                          

 

                      MCH (test code = 1006) 31.5 PG                          

 

                      MCHC (test code = 1007) 34.1 G/DL                        

 

                      RDW (test code = 1038) 12.9 %                           

 

                      NEUTROPHILS (test code = 1008) 57.6 %                     

      

 

                      LYMPHOCYTES (test code = 1010) 32.8 %                     

      

 

                      MONOCYTES (test code = 1011) 7.0 %                        

    

 

                      EOSINOPHILS (test code = 1012) 2.2 %                      

      

 

                      BASOPHILS (test code = 1013) 0.1 %                        

    

 

                                                    IMMATURE GRANYLOCYTES (test 

code = 1036)    0.3 %                                           

 

                                                    NUCLEATED RBCS (test code = 

1065)           0.0 /100WBC'S                                   

 

                                                    PLATELET COUNT (test code = 

1015)           299 K/UL                                        

 

                                                    ABSOLUTE NEUTROPHILS (test c

ode 

= 1066)         4.45 K/UL                                       

 

                                                    ABSOLUTE LYMPHOCYTES (test c

ode 

= 1067)         2.53 K/UL                                       

 

                                                    ABSOLUTE MONOCYTES (test cod

e = 

1068)           0.54 K/UL                                       

 

                                                    ABSOLUTE EOSINOPHILS (test c

ode 

= 1040)         0.17 K/UL                                       

 

                                                    ABSOLUTE BASOPHILS (test cod

e = 

1069)           0.01 K/UL                                       

 

                                                    ABS IMMATURE GRANULOCYTES (t

est 

code = 1020)    0.02 K/UL                                       

 

                                                    ABS NUCLEATED RBCS (test cod

e = 

06216)          0.00 K/UL                                       





Melvin BenitezCOMPREHENSIVE METABOLIC [id number] 00:00:00* 



                      Test Item  Value      Reference Range Interpretation Comme

nts

 

                      GLUCOSE (test code = 2217) 84 MG/DL                       

  

 

                      BUN (test code = 2208) 7 MG/DL                          

 

                      CREATININE (test code = 2214) 0.65 MG/DL                  

     

 

                                                    eGFR ( CKD-EPI) (test 

code = 55349)   121 ML/MIN/1.73                                 

 

                                                    CALC BUN/CREAT (test code = 

2235)           11 RATIO                                        

 

                      SODIUM (test code = 2231) 139 MEQ/L                       

 

 

                      POTASSIUM (test code = 2228) 4.5 MEQ/L                    

    

 

                      CHLORIDE (test code = 2215) 104 MEQ/L                     

   

 

                                                    CARBON DIOXIDE (test code = 

2206)           24 MEQ/L                                        

 

                      CALCIUM (test code = 2209) 9.2 MG/DL                      

  

 

                                                    PROTEIN, TOTAL (test code = 

2229)           6.7 G/DL                                        

 

                      ALBUMIN (test code = 2201) 4.4 G/DL                       

  

 

                                                    CALC GLOBULIN (test code = 

2240)           2.3 G/DL                                        

 

                                                    CALC A/G RATIO (test code = 

2234)           1.9 RATIO                                       

 

                                                    BILIRUBIN, TOTAL (test code 

= 

2207)           0.3 MG/DL                                       

 

                                                    ALKALINE PHOSPHATASE (test 

code = 2204)    101 U/L                                         

 

                      AST (test code = 2218) 19 U/L                           

 

                      ALT (test code = 2219) 17 U/L                           





Melvin BenitezLIPID [id number] 00:00:00* 



                      Test Item  Value      Reference Range Interpretation Comme

nts

 

                      CHOLESTEROL (test code = 2210) 136 MG/DL                  

      

 

                      TRIGLYCERIDES (test code = 2232) 158 MG/DL                

        

 

                      HDL CHOLESTEROL (test code = 2220) 34 MG/DL               

          

 

                      CALC LDL CHOL (test code = 2237) 77 MG/DL                 

        

 

                                                    RISK RATIO LDL/HDL (test cod

e = 

2238)           2.26 RATIO                                      





Melvin [id number] 00:00:00* 



                      Test Item  Value      Reference Range Interpretation Comme

nts

 

                                                    TSH, THIRD GENERATION (test 

code 

= 2821)         0.970 UIU/ML                                    





Melvin BenitezCBC W/AUTO [id number] 00:00:00* 



                      Test Item  Value      Reference Range Interpretation Comme

nts

 

                      WBC (test code = 1001) 7.7 K/UL                         

 

                      RBC (test code = 1002) 4.48 M/UL                        

 

                      HEMOGLOBIN (test code = 1003) 14.1 G/DL                   

     

 

                      HEMATOCRIT (test code = 1004) 41.3 %                      

     

 

                      MCV (test code = 1005) 92.2 fL                          

 

                      MCH (test code = 1006) 31.5 PG                          

 

                      MCHC (test code = 1007) 34.1 G/DL                        

 

                      RDW (test code = 1038) 12.9 %                           

 

                      NEUTROPHILS (test code = 1008) 57.6 %                     

      

 

                      LYMPHOCYTES (test code = 1010) 32.8 %                     

      

 

                      MONOCYTES (test code = 1011) 7.0 %                        

    

 

                      EOSINOPHILS (test code = 1012) 2.2 %                      

      

 

                      BASOPHILS (test code = 1013) 0.1 %                        

    

 

                                                    IMMATURE GRANYLOCYTES (test 

code = 1036)    0.3 %                                           

 

                                                    NUCLEATED RBCS (test code = 

1065)           0.0 /100WBC'S                                   

 

                                                    PLATELET COUNT (test code = 

1015)           299 K/UL                                        

 

                                                    ABSOLUTE NEUTROPHILS (test c

ode 

= 1066)         4.45 K/UL                                       

 

                                                    ABSOLUTE LYMPHOCYTES (test c

ode 

= 1067)         2.53 K/UL                                       

 

                                                    ABSOLUTE MONOCYTES (test cod

e = 

1068)           0.54 K/UL                                       

 

                                                    ABSOLUTE EOSINOPHILS (test c

ode 

= 1040)         0.17 K/UL                                       

 

                                                    ABSOLUTE BASOPHILS (test cod

e = 

1069)           0.01 K/UL                                       

 

                                                    ABS IMMATURE GRANULOCYTES (t

est 

code = 1020)    0.02 K/UL                                       

 

                                                    ABS NUCLEATED RBCS (test cod

e = 

04445)          0.00 K/UL                                       





[facility] [id number] 00:00:00* 



                      Test Item  Value      Reference Range Interpretation Comme

nts

 

                      WBC (test code = 1001) 7.7 K/UL                         

 

                      RBC (test code = 1002) 4.48 M/UL                        

 

                      HEMOGLOBIN (test code = 1003) 14.1 G/DL                   

     

 

                      HEMATOCRIT (test code = 1004) 41.3 %                      

     

 

                      MCV (test code = 1005) 92.2 fL                          

 

                      MCH (test code = 1006) 31.5 PG                          

 

                      MCHC (test code = 1007) 34.1 G/DL                        

 

                      RDW (test code = 1038) 12.9 %                           

 

                      NEUTROPHILS (test code = 1008) 57.6 %                     

      

 

                      LYMPHOCYTES (test code = 1010) 32.8 %                     

      

 

                      MONOCYTES (test code = 1011) 7.0 %                        

    

 

                      EOSINOPHILS (test code = 1012) 2.2 %                      

      

 

                      BASOPHILS (test code = 1013) 0.1 %                        

    

 

                                                    IMMATURE GRANYLOCYTES (test 

code = 1036)    0.3 %                                           

 

                                                    NUCLEATED RBCS (test code = 

1065)           0.0 /100WBC'S                                   

 

                                                    PLATELET COUNT (test code = 

1015)           299 K/UL                                        

 

                                                    ABSOLUTE NEUTROPHILS (test c

ode 

= 1066)         4.45 K/UL                                       

 

                                                    ABSOLUTE LYMPHOCYTES (test c

ode 

= 1067)         2.53 K/UL                                       

 

                                                    ABSOLUTE MONOCYTES (test cod

e = 

1068)           0.54 K/UL                                       

 

                                                    ABSOLUTE EOSINOPHILS (test c

ode 

= 1040)         0.17 K/UL                                       

 

                                                    ABSOLUTE BASOPHILS (test cod

e = 

1069)           0.01 K/UL                                       

 

                                                    ABS IMMATURE GRANULOCYTES (t

est 

code = 1020)    0.02 K/UL                                       

 

                                                    ABS NUCLEATED RBCS (test cod

e = 

45010)          0.00 K/UL                                       





COMPREHENSIVE METABOLIC [id number] 00:00:00* 



                      Test Item  Value      Reference Range Interpretation Comme

nts

 

                      GLUCOSE (test code = 2217) 84 MG/DL                       

  

 

                      BUN (test code = 2208) 7 MG/DL                          

 

                      CREATININE (test code = 2214) 0.65 MG/DL                  

     

 

                                                    eGFR ( CKD-EPI) (test 

code = 79570)   121 ML/MIN/1.73                                 

 

                                                    CALC BUN/CREAT (test code = 

2235)           11 RATIO                                        

 

                      SODIUM (test code = 2231) 139 MEQ/L                       

 

 

                      POTASSIUM (test code = 2228) 4.5 MEQ/L                    

    

 

                      CHLORIDE (test code = 2215) 104 MEQ/L                     

   

 

                                                    CARBON DIOXIDE (test code = 

2206)           24 MEQ/L                                        

 

                      CALCIUM (test code = 2209) 9.2 MG/DL                      

  

 

                                                    PROTEIN, TOTAL (test code = 

2229)           6.7 G/DL                                        

 

                      ALBUMIN (test code = 2201) 4.4 G/DL                       

  

 

                                                    CALC GLOBULIN (test code = 

2240)           2.3 G/DL                                        

 

                                                    CALC A/G RATIO (test code = 

2234)           1.9 RATIO                                       

 

                                                    BILIRUBIN, TOTAL (test code 

= 

2207)           0.3 MG/DL                                       

 

                                                    ALKALINE PHOSPHATASE (test 

code = 2204)    101 U/L                                         

 

                      AST (test code = 2218) 19 U/L                           

 

                      ALT (test code = 2219) 17 U/L                           





LIPID [id number] 00:00:00* 



                      Test Item  Value      Reference Range Interpretation Comme

nts

 

                      CHOLESTEROL (test code = 2210) 136 MG/DL                  

      

 

                      TRIGLYCERIDES (test code = 2232) 158 MG/DL                

        

 

                      HDL CHOLESTEROL (test code = 2220) 34 MG/DL               

          

 

                      CALC LDL CHOL (test code = 2237) 77 MG/DL                 

        

 

                                                    RISK RATIO LDL/HDL (test cod

e = 

2238)           2.26 RATIO                                      





[id number] 00:00:00* 



                      Test Item  Value      Reference Range Interpretation Comme

nts

 

                                                    TSH, THIRD GENERATION (test 

code 

= 2821)         0.970 UIU/ML                                    





CBC W/AUTO [id number] 00:00:00* 



                      Test Item  Value      Reference Range Interpretation Comme

nts

 

                      WBC (test code = 1001) 7.7 K/UL                         

 

                      RBC (test code = 1002) 4.48 M/UL                        

 

                      HEMOGLOBIN (test code = 1003) 14.1 G/DL                   

     

 

                      HEMATOCRIT (test code = 1004) 41.3 %                      

     

 

                      MCV (test code = 1005) 92.2 fL                          

 

                      MCH (test code = 1006) 31.5 PG                          

 

                      MCHC (test code = 1007) 34.1 G/DL                        

 

                      RDW (test code = 1038) 12.9 %                           

 

                      NEUTROPHILS (test code = 1008) 57.6 %                     

      

 

                      LYMPHOCYTES (test code = 1010) 32.8 %                     

      

 

                      MONOCYTES (test code = 1011) 7.0 %                        

    

 

                      EOSINOPHILS (test code = 1012) 2.2 %                      

      

 

                      BASOPHILS (test code = 1013) 0.1 %                        

    

 

                                                    IMMATURE GRANYLOCYTES (test 

code = 1036)    0.3 %                                           

 

                                                    NUCLEATED RBCS (test code = 

1065)           0.0 /100WBC'S                                   

 

                                                    PLATELET COUNT (test code = 

1015)           299 K/UL                                        

 

                                                    ABSOLUTE NEUTROPHILS (test c

ode 

= 1066)         4.45 K/UL                                       

 

                                                    ABSOLUTE LYMPHOCYTES (test c

ode 

= 1067)         2.53 K/UL                                       

 

                                                    ABSOLUTE MONOCYTES (test cod

e = 

1068)           0.54 K/UL                                       

 

                                                    ABSOLUTE EOSINOPHILS (test c

ode 

= 1040)         0.17 K/UL                                       

 

                                                    ABSOLUTE BASOPHILS (test cod

e = 

1069)           0.01 K/UL                                       

 

                                                    ABS IMMATURE GRANULOCYTES (t

est 

code = 1020)    0.02 K/UL                                       

 

                                                    ABS NUCLEATED RBCS (test cod

e = 

32762)          0.00 K/UL                                       





COMPREHENSIVE METABOLIC [id number] 00:00:00* 



                      Test Item  Value      Reference Range Interpretation Comme

nts

 

                      GLUCOSE (test code = 2217) 84 MG/DL                       

  

 

                      BUN (test code = 2208) 7 MG/DL                          

 

                      CREATININE (test code = 2214) 0.65 MG/DL                  

     

 

                                                    eGFR ( CKD-EPI) (test 

code = 50944)   121 ML/MIN/1.73                                 

 

                                                    CALC BUN/CREAT (test code = 

2235)           11 RATIO                                        

 

                      SODIUM (test code = 2231) 139 MEQ/L                       

 

 

                      POTASSIUM (test code = 2228) 4.5 MEQ/L                    

    

 

                      CHLORIDE (test code = 2215) 104 MEQ/L                     

   

 

                                                    CARBON DIOXIDE (test code = 

2206)           24 MEQ/L                                        

 

                      CALCIUM (test code = 2209) 9.2 MG/DL                      

  

 

                                                    PROTEIN, TOTAL (test code = 

2229)           6.7 G/DL                                        

 

                      ALBUMIN (test code = 2201) 4.4 G/DL                       

  

 

                                                    CALC GLOBULIN (test code = 

2240)           2.3 G/DL                                        

 

                                                    CALC A/G RATIO (test code = 

2234)           1.9 RATIO                                       

 

                                                    BILIRUBIN, TOTAL (test code 

= 

2207)           0.3 MG/DL                                       

 

                                                    ALKALINE PHOSPHATASE (test 

code = 2204)    101 U/L                                         

 

                      AST (test code = 2218) 19 U/L                           

 

                      ALT (test code = 2219) 17 U/L                           





LIPID [id number] 00:00:00* 



                      Test Item  Value      Reference Range Interpretation Comme

nts

 

                      CHOLESTEROL (test code = 2210) 136 MG/DL                  

      

 

                      TRIGLYCERIDES (test code = 2232) 158 MG/DL                

        

 

                      HDL CHOLESTEROL (test code = 2220) 34 MG/DL               

          

 

                      CALC LDL CHOL (test code = 2237) 77 MG/DL                 

        

 

                                                    RISK RATIO LDL/HDL (test cod

e = 

2238)           2.26 RATIO                                      





[id number] 00:00:00* 



                      Test Item  Value      Reference Range Interpretation Comme

nts

 

                                                    TSH, THIRD GENERATION (test 

code 

= 2821)         0.970 UIU/ML                                    





VAGINAL PATHOGENS DNA [id number] 00:00:00* 



                      Test Item  Value      Reference Range Interpretation Comme

nts

 

                      TETO SPECIES (test code = 12789) NEGATIVE              

           

 

                      G. VAGINALIS (test code = 40319) NEGATIVE                 

        

 

                      T. VAGINALIS (test code = 78271) NEGATIVE                 

        





Melvin F AustinVAGINAL PATHOGENS DNA [id number] 00:00:00* 



                      Test Item  Value      Reference Range Interpretation Comme

nts

 

                      TETO SPECIES (test code = 39319) NEGATIVE              

           

 

                      G. VAGINALIS (test code = 16029) NEGATIVE                 

        

 

                      T. VAGINALIS (test code = 43174) NEGATIVE                 

        





Melvin F AustinVAGINAL PATHOGENS DNA [id number] 00:00:00* 



                      Test Item  Value      Reference Range Interpretation Comme

nts

 

                      TETO SPECIES (test code = 75186) NEGATIVE              

           

 

                      G. VAGINALIS (test code = 01864) NEGATIVE                 

        

 

                      T. VAGINALIS (test code = 98997) NEGATIVE                 

        





Melvin F AustinVAGINAL PATHOGENS DNA [id number] 00:00:00* 



                      Test Item  Value      Reference Range Interpretation Comme

nts

 

                      TETO SPECIES (test code = 81769) NEGATIVE              

           

 

                      G. VAGINALIS (test code = 33055) NEGATIVE                 

        

 

                      T. VAGINALIS (test code = 68312) NEGATIVE                 

        





Melvin F AustinVAGINAL PATHOGENS DNA [id number] 00:00:00* 



                      Test Item  Value      Reference Range Interpretation Comme

nts

 

                      TETO SPECIES (test code = 93742) NEGATIVE              

           

 

                      G. VAGINALIS (test code = 51273) NEGATIVE                 

        

 

                      T. VAGINALIS (test code = 92702) NEGATIVE                 

        





VAGINAL PATHOGENS DNA [id number] 00:00:00* 



                      Test Item  Value      Reference Range Interpretation Comme

nts

 

                      TETO SPECIES (test code = 14974) NEGATIVE              

           

 

                      G. VAGINALIS (test code = 26728) NEGATIVE                 

        

 

                      T. VAGINALIS (test code = 01313) NEGATIVE                 

        





VAGINAL PATHOGENS DNA [id number] 00:00:00* 



                      Test Item  Value      Reference Range Interpretation Comme

nts

 

                      TETO SPECIES (test code = 71027) NEGATIVE              

           

 

                      G. VAGINALIS (test code = 03476) NEGATIVE                 

        

 

                      T. VAGINALIS (test code = 94906) NEGATIVE                 

        





Melvin F AustinVAGINAL PATHOGENS DNA [id number] 00:00:00* 



                      Test Item  Value      Reference Range Interpretation Comme

nts

 

                      TETO SPECIES (test code = 50261) NEGATIVE              

           

 

                      G. VAGINALIS (test code = 87515) NEGATIVE                 

        

 

                      T. VAGINALIS (test code = 38775) NEGATIVE                 

        





Melvin F AustinVAGINAL PATHOGENS DNA [id number] 00:00:00* 



                      Test Item  Value      Reference Range Interpretation Comme

nts

 

                      TETO SPECIES (test code = 97713) NEGATIVE              

           

 

                      G. VAGINALIS (test code = 90738) NEGATIVE                 

        

 

                      T. VAGINALIS (test code = 75927) NEGATIVE                 

        





Melvin F AustinVAGINAL PATHOGENS DNA [id number] 00:00:00* 



                      Test Item  Value      Reference Range Interpretation Comme

nts

 

                      TETO SPECIES (test code = 38169) NEGATIVE              

           

 

                      G. VAGINALIS (test code = 58649) NEGATIVE                 

        

 

                      T. VAGINALIS (test code = 70892) NEGATIVE                 

        





Melvin F AustinVAGINAL PATHOGENS DNA [id number] 00:00:00* 



                      Test Item  Value      Reference Range Interpretation Comme

nts

 

                      TETO SPECIES (test code = 77331) NEGATIVE              

           

 

                      G. VAGINALIS (test code = 02126) NEGATIVE                 

        

 

                      T. VAGINALIS (test code = 51466) NEGATIVE                 

        





VAGINAL PATHOGENS DNA [id number] 00:00:00* 



                      Test Item  Value      Reference Range Interpretation Comme

nts

 

                      TETO SPECIES (test code = 23371) NEGATIVE              

           

 

                      G. VAGINALIS (test code = 75444) NEGATIVE                 

        

 

                      T. VAGINALIS (test code = 25640) NEGATIVE                 

        





SURGICAL [id number] 18:10:00* 



                      Test Item  Value      Reference Range Interpretation Comme

nts

 

                                                    SURGICAL SPECIMENS 

(test code = SURG)                      -------------------------

-------------------------

-------------------------

-----------------RUN 

DATE: 21 [facility] 

PAGE 1 RUN TIME:   

Specimen Inquiry RUN 

USER: INTERFACE 

-------------------------

-----------------PATIENT: 

EDVIN HOUSE ACCT 

#: [id number] LOC: MICHELLE 

U #: [id number] AGE/SX: 

30/F ROOM: [facility] RE21REG DR: 

Josué Flores MD : 

90 BED: 1 DIS: 

21 STATUS: DIS IN 

TLOC: 

-------------------------

----------------- SPEC #: 

[id number]- RECD: 

 STATUS: 

ZHEN RE #: [id number] 

LAUREN:  SUBM 

DR: Josué Flores MD 

ENTERED:  SP 

TYPE: SURG OTHR DR: 

ORDERED: LEVEL II, PATH 

SPEC, H E STAIN  TISSUES: 

A. FALLOPIAN TUBE 

STERILIZATION - Left and 

right tubal CLINICAL 

HISTORY 

Diagnosis/Clinical Data: 

Tubal ligation Operative 

Procedure: Postpartum 

tubal ligation FINAL 

DIAGNOSIS Left and right 

fallopian tubes, 

bilateral tubal ligation: 

Fallopian tube tissues 

identified, transected (x 

2).  Electronically 

signed by: Lauri Thakkar MD 2021 

GROSS DESCRIPTION 

Received in formalin 

labeled "left and right 

tubal" are two, 

undesignated tan-pink, 

partial fallopian tube 

segments measuring 0.6 

and 0.8 cm in length by 

0.3 cm in diameter. The 

serosal surfaces are 

tan-pink and smooth with 

no masses identified. A 

representative 

cross-section from each 

segment is submitted in 

A1-A2. TR 2021 

05:13 PM MICROSCOPIC 

DESCRIPTION Sections of 

both fallopian tubes show 

a central lumen 

surrounded by smooth 

muscle. The mucosal 

plicae are lined by 

columnar epithelium and 

there is no significant 

acute or chronic 

inflammation. Both tubes 

have been completely 

transected.--------------

-------------------------

--- Signed SIGNATURE ON 

FILE Lauri Thakkar MD 

21 

-------------------------

----------------- ** END 

OF REPORT **                                                





[id number] 10:05:00* 



                      Test Item  Value      Reference Range Interpretation Comme

nts

 

                      GLUBED (test code = GLUBED) 94 MG/DL        N       

   





CBC W/AUTO [id number] 04:51:00* 



                      Test Item  Value      Reference Range Interpretation Comme

nts

 

                                                    WHITE BLOOD CELL (test code 

= 

WBC)            14.2 x10 3/uL   3.2-11.5        H               

 

                                                    RED BLOOD CELL (test code = 

RBC)            3.70 x10(6)/m   3.70-5.10       N               

 

                      HEMOGLOBIN (test code = HGB) 11.3 g/dL  12.0-15.0  L      

    

 

                      HEMATOCRIT (test code = HCT) 35.3 %     35.7-44.8  L      

    

 

                                                    MEAN CELL VOLUME (test code 

= 

MCV)            95 fL                     N               

 

                      MEAN CELL HGB (test code = MCH) 30.5 pg    26.2-33.8  N   

       

 

                                                    MEAN CELL HGB CONCENTRATION 

(test code = MCHC) 32.0 g/dL       30.0-34.0       N               

 

                                                    RED CELL DISTRIBUTION WIDTH 

(test code = RDW) 13.1 %          11.3-14.5       N               

 

                                                    PLATELET COUNT (test code = 

PLT)            253 x10 3/uL    130-408         N               

 

                                                    MEAN PLATELET VOLUME (test c

ode 

= MPV)          10.9 fL         8.6-12.6        N               

 

                      NEUTROPHIL % (test code = NT%) 74.2 %     40.0-70.0  H    

      

 

                                                    IMMATURE GRANULOCYTE % (test

 

code = IG%)     0.4 %           0.0-2.0         N               

 

                      LYMPHOCYTE % (test code = LY%) 14.7 %     20-40      L    

      

 

                      MONOCYTE % (test code = MO%) 10.0 %     1-10       N      

    

 

                      EOSINOPHIL % (test code = EO%) 0.6 %      0.0-5.0    N    

      

 

                      BASOPHIL % (test code = BA%) 0.1 %      0.0-1.0    N      

    

 

                                                    NUCLEATED RBC % (test code =

 

NRBC%)          0.0 %           0.0-0.9         N               

 

                      NEUTROPHIL # (test code = NT#) 10.6 x10 3/uL 1.6-7.2    H 

         

 

                      LYMPHOCYTE # (test code = LY#) 2.09 x10 3/uL 1.1-2.7    N 

         

 

                      MONOCYTE # (test code = MO#) 1.4 x10 3/uL 0.3-0.8    H    

      

 

                      EOSINOPHIL # (test code = EO#) 0.1 x10 3/uL 0.0-0.5    N  

        

 

                      BASOPHIL # (test code = BA#) 0.0 x10 3/uL 0.0-0.1    N    

      





RAPID PLASMA [id number] 10:16:00* 



                      Test Item  Value      Reference Range Interpretation Comme

nts

 

                                                    RAPID PLASMA REAGIN (test co

de = 

RPR)            NEGATIVE        NEGATIVE                        





[id number] 09:20:00* 



                      Test Item  Value      Reference Range Interpretation Comme

nts

 

                      GLUBED (test code = GLUBED) 88 MG/DL        N       

   





AG HEPATITIS B [id number] 04:56:00* 



                      Test Item  Value      Reference Range Interpretation Comme

nts

 

                                                    AG HEPATITIS B SURFACE (test

 code = 

HBSAG)          NEGATIVE        NEGATIVE                        





AB HIV 1  04:56:00* 



                      Test Item  Value      Reference Range Interpretation Comme

nts

 

                      AB HIV 1 2 (test code = SQH05EW) NEGATIVE   NEGATIVE      

        





AB RUBELLA [id number] 02:47:00* 



                      Test Item  Value      Reference Range Interpretation Comme

nts

 

                                                    AB RUBELLA IGG 

(test code = 

RUBGAB)         POSITIVE        NEGATIVE        A               Interpretive Huber

a: Rubella 

IgG Concentrations between 

>= 10 IU/mL and < 15 IU/mL 

are considered 

indeterminate for 

determining immunity to 

rubella. Studies suggest 

that vaccinated 

individuals having these 

low levels of anti-rubella 

IgG do show a secondary 

immune response following 

re-vaccination but have 

not been challenged with 

wild rubella virus (9). A 

follow-up sample should be 

taken to further evaluate 

immune status. If the 

repeat sample is still 

indeterminate, the sample 

may require testing by 

alternate methods





CBC W/AUTO [id number] 02:02:00* 



                      Test Item  Value      Reference Range Interpretation Comme

nts

 

                                                    WHITE BLOOD CELL (test code 

= 

WBC)            12.9 x10 3/uL   3.2-11.5        H               

 

                                                    RED BLOOD CELL (test code = 

RBC)            4.10 x10(6)/m   3.70-5.10       N               

 

                      HEMOGLOBIN (test code = HGB) 12.8 g/dL  12.0-15.0  N      

    

 

                      HEMATOCRIT (test code = HCT) 38.2 %     35.7-44.8  N      

    

 

                                                    MEAN CELL VOLUME (test code 

= 

MCV)            93 fL                     N               

 

                      MEAN CELL HGB (test code = MCH) 31.2 pg    26.2-33.8  N   

       

 

                                                    MEAN CELL HGB CONCENTRATION 

(test code = MCHC) 33.5 g/dL       30.0-34.0       N               

 

                                                    RED CELL DISTRIBUTION WIDTH 

(test code = RDW) 13.2 %          11.3-14.5       N               

 

                                                    PLATELET COUNT (test code = 

PLT)            297 x10 3/uL    130-408         N               

 

                                                    MEAN PLATELET VOLUME (test c

ode 

= MPV)          10.2 fL         8.6-12.6        N               

 

                      NEUTROPHIL % (test code = NT%) 70.7 %     40.0-70.0  H    

      

 

                                                    IMMATURE GRANULOCYTE % (test

 

code = IG%)     0.4 %           0.0-2.0         N               

 

                      LYMPHOCYTE % (test code = LY%) 18.9 %     20-40      L    

      

 

                      MONOCYTE % (test code = MO%) 9.3 %      1-10       N      

    

 

                      EOSINOPHIL % (test code = EO%) 0.5 %      0.0-5.0    N    

      

 

                      BASOPHIL % (test code = BA%) 0.2 %      0.0-1.0    N      

    

 

                                                    NUCLEATED RBC % (test code =

 

NRBC%)          0.0 %           0.0-0.9         N               

 

                      NEUTROPHIL # (test code = NT#) 9.1 x10 3/uL 1.6-7.2    H  

        

 

                      LYMPHOCYTE # (test code = LY#) 2.43 x10 3/uL 1.1-2.7    N 

         

 

                      MONOCYTE # (test code = MO#) 1.2 x10 3/uL 0.3-0.8    H    

      

 

                      EOSINOPHIL # (test code = EO#) 0.1 x10 3/uL 0.0-0.5    N  

        

 

                      BASOPHIL # (test code = BA#) 0.0 x10 3/uL 0.0-0.1    N    

      





COVID 19 Asymptomatic IH [id number] 02:01:00* 



                      Test Item  Value      Reference Range Interpretation Comme

nts

 

                                                    COVID 19 Asymptomatic 

IH AG (test code = 

COVNONPUIAG)    Negative        Negative                        Negative results

 should 

be treated as 

presumptive 

andconfirmed with a 

molecular assay, if 

necessary for 

patientmanagement. 

Negative results do not 

rule out COVID-19 

andshould not be used 

as the sole basis for 

treatment orpatient 

management decisions, 

including infection 

controldecisions. 

Negative results should 

be considered in 

thecontext of a 

patient's recent 

exposures, history and 

thepresence of clnical 

signs and symptoms 

consistent 

withCOVID-19.Specimen 

Source: Nasopharyngeal 

(NP) Swab





CBC W/AUTO [id number] 02:00:00* 



                      Test Item  Value      Reference Range Interpretation Comme

nts

 

                                                    WHITE BLOOD CELL (test code 

= 

WBC)            12.9 x10 3/uL   3.2-11.5        H               

 

                                                    RED BLOOD CELL (test code = 

RBC)            4.10 x10(6)/m   3.70-5.10       N               

 

                      HEMOGLOBIN (test code = HGB) 12.8 g/dL  12.0-15.0  N      

    

 

                      HEMATOCRIT (test code = HCT) 38.2 %     35.7-44.8  N      

    

 

                                                    MEAN CELL VOLUME (test code 

= 

MCV)            93 fL                     N               

 

                      MEAN CELL HGB (test code = MCH) 31.2 pg    26.2-33.8  N   

       

 

                                                    MEAN CELL HGB CONCENTRATION 

(test code = MCHC) 33.5 g/dL       30.0-34.0       N               

 

                                                    RED CELL DISTRIBUTION WIDTH 

(test code = RDW) %               11.3-14.5                       

 

                                                    PLATELET COUNT (test code = 

PLT)            x10 3/uL        130-408                         

 

                                                    MEAN PLATELET VOLUME (test c

ode 

= MPV)          10.2 fL         8.6-12.6        N               

 

                      NEUTROPHIL % (test code = NT%) %          40.0-70.0       

      

 

                      LYMPHOCYTE % (test code = LY%) %          20-40           

      

 

                      MONOCYTE % (test code = MO%) %          1-10              

    

 

                      EOSINOPHIL % (test code = EO%) %          0.0-5.0         

      

 

                      BASOPHIL % (test code = BA%) %          0.0-1.0           

    

 

                                                    NUCLEATED RBC % (test code =

 

NRBC%)          %               0.0-0.9                         

 

                      NEUTROPHIL # (test code = NT#) x10 3/uL   1.6-7.2         

      

 

                      LYMPHOCYTE # (test code = LY#) x10 3/uL   1.1-2.7         

      

 

                      MONOCYTE # (test code = MO#) x10 3/uL   0.3-0.8           

    

 

                      EOSINOPHIL # (test code = EO#) x10 3/uL   0.0-0.5         

      





CULTURE, [id number] 00:00:00* 



                      Test Item  Value      Reference Range Interpretation Comme

nts

 

                                                    CULTURE, URINE (test 

code = 42704)                           SPECIMEN NUMBER: 

[id number]                                                   





Melvin AdkinsLTURE, [id number] 00:00:00* 



                      Test Item  Value      Reference Range Interpretation Comme

nts

 

                                                    CULTURE, URINE (test 

code = 89090)                           SPECIMEN NUMBER: 

[id number]                                                   





Melvin BenitezCULTONEIL, [id number] 00:00:00* 



                      Test Item  Value      Reference Range Interpretation Comme

nts

 

                                                    CULTURE, URINE (test 

code = 81538)                           SPECIMEN NUMBER: 

[id number]                                                   





Melvin BenitezCULTURE, [id number] 00:00:00* 



                      Test Item  Value      Reference Range Interpretation Comme

nts

 

                                                    CULTURE, URINE (test 

code = 76354)                           SPECIMEN NUMBER: 

[id number]                                                   





Melvin BenitezCULTURE, [id number] 00:00:00* 



                      Test Item  Value      Reference Range Interpretation Comme

nts

 

                                                    CULTURE, URINE (test 

code = 70064)                           SPECIMEN NUMBER: 

[id number]                                                   





CULTURE, [id number] 00:00:00* 



                      Test Item  Value      Reference Range Interpretation Comme

nts

 

                                                    CULTURE, URINE (test 

code = 50781)                           SPECIMEN NUMBER: 

[id number]                                                   





DRUG ABUSE PANEL 10 WITH [id number] 00:00:00* 



                      Test Item  Value      Reference Range Interpretation Comme

nts

 

                      AMPHETAMINES (test code = 3201) NEGATIVE                  

       

 

                      BARBITURATES (test code = 3202) NEGATIVE                  

       

 

                      BENZODIAZEPINES (test code = 3203) NEGATIVE               

          

 

                      CANNABINOIDS (test code = 3204) NEGATIVE                  

       

 

                                                    COCAINE METABOLITE (test cod

e = 

3205)           NEGATIVE                                        

 

                      OPIATES (test code = 3209) NEGATIVE                       

  

 

                      OXYCODONE (test code = 12164) NEGATIVE                    

     

 

                      PHENCYCLIDINE (test code = 3210) NEGATIVE                 

        

 

                      METHADONE (test code = 3207) NEGATIVE                     

    

 

                      BUPRENORPHINE (test code = 67633) NEGATIVE                

         





Melvin F AustinDRUG ABUSE PANEL 10 WITH [id number] 00:00:00* 



                      Test Item  Value      Reference Range Interpretation Comme

nts

 

                      AMPHETAMINES (test code = 3201) NEGATIVE                  

       

 

                      BARBITURATES (test code = 3202) NEGATIVE                  

       

 

                      BENZODIAZEPINES (test code = 3203) NEGATIVE               

          

 

                      CANNABINOIDS (test code = 3204) NEGATIVE                  

       

 

                                                    COCAINE METABOLITE (test cod

e = 

3205)           NEGATIVE                                        

 

                      OPIATES (test code = 3209) NEGATIVE                       

  

 

                      OXYCODONE (test code = 69674) NEGATIVE                    

     

 

                      PHENCYCLIDINE (test code = 3210) NEGATIVE                 

        

 

                      METHADONE (test code = 3207) NEGATIVE                     

    

 

                      BUPRENORPHINE (test code = 62559) NEGATIVE                

         





Melvin F AustinDRUG ABUSE PANEL 10 WITH [id number] 00:00:00* 



                      Test Item  Value      Reference Range Interpretation Comme

nts

 

                      AMPHETAMINES (test code = 3201) NEGATIVE                  

       

 

                      BARBITURATES (test code = 3202) NEGATIVE                  

       

 

                      BENZODIAZEPINES (test code = 3203) NEGATIVE               

          

 

                      CANNABINOIDS (test code = 3204) NEGATIVE                  

       

 

                                                    COCAINE METABOLITE (test cod

e = 

3205)           NEGATIVE                                        

 

                      OPIATES (test code = 3209) NEGATIVE                       

  

 

                      OXYCODONE (test code = 50068) NEGATIVE                    

     

 

                      PHENCYCLIDINE (test code = 3210) NEGATIVE                 

        

 

                      METHADONE (test code = 3207) NEGATIVE                     

    

 

                      BUPRENORPHINE (test code = 10865) NEGATIVE                

         





Melvin F AustinDRUG ABUSE PANEL 10 WITH [id number] 00:00:00* 



                      Test Item  Value      Reference Range Interpretation Comme

nts

 

                      AMPHETAMINES (test code = 3201) NEGATIVE                  

       

 

                      BARBITURATES (test code = 3202) NEGATIVE                  

       

 

                      BENZODIAZEPINES (test code = 3203) NEGATIVE               

          

 

                      CANNABINOIDS (test code = 3204) NEGATIVE                  

       

 

                                                    COCAINE METABOLITE (test cod

e = 

3205)           NEGATIVE                                        

 

                      OPIATES (test code = 3209) NEGATIVE                       

  

 

                      OXYCODONE (test code = 56403) NEGATIVE                    

     

 

                      PHENCYCLIDINE (test code = 3210) NEGATIVE                 

        

 

                      METHADONE (test code = 3207) NEGATIVE                     

    

 

                      BUPRENORPHINE (test code = 29251) NEGATIVE                

         





Melvin F AustinDRUG ABUSE PANEL 10 WITH [id number] 00:00:00* 



                      Test Item  Value      Reference Range Interpretation Comme

nts

 

                      AMPHETAMINES (test code = 3201) NEGATIVE                  

       

 

                      BARBITURATES (test code = 3202) NEGATIVE                  

       

 

                      BENZODIAZEPINES (test code = 3203) NEGATIVE               

          

 

                      CANNABINOIDS (test code = 3204) NEGATIVE                  

       

 

                                                    COCAINE METABOLITE (test cod

e = 

3205)           NEGATIVE                                        

 

                      OPIATES (test code = 3209) NEGATIVE                       

  

 

                      OXYCODONE (test code = 53048) NEGATIVE                    

     

 

                      PHENCYCLIDINE (test code = 3210) NEGATIVE                 

        

 

                      METHADONE (test code = 3207) NEGATIVE                     

    

 

                      BUPRENORPHINE (test code = 54286) NEGATIVE                

         





DRUG ABUSE PANEL 10 WITH [id number] 00:00:00* 



                      Test Item  Value      Reference Range Interpretation Comme

nts

 

                      AMPHETAMINES (test code = 3201) NEGATIVE                  

       

 

                      BARBITURATES (test code = 3202) NEGATIVE                  

       

 

                      BENZODIAZEPINES (test code = 3203) NEGATIVE               

          

 

                      CANNABINOIDS (test code = 3204) NEGATIVE                  

       

 

                                                    COCAINE METABOLITE (test cod

e = 

3205)           NEGATIVE                                        

 

                      OPIATES (test code = 3209) NEGATIVE                       

  

 

                      OXYCODONE (test code = 50854) NEGATIVE                    

     

 

                      PHENCYCLIDINE (test code = 3210) NEGATIVE                 

        

 

                      METHADONE (test code = 3207) NEGATIVE                     

    

 

                      BUPRENORPHINE (test code = 15896) NEGATIVE                

         





VAGINAL PATHOGENS DNA [id number] 00:00:00* 



                      Test Item  Value      Reference Range Interpretation Comme

nts

 

                      TETO SPECIES (test code = ) NEGATIVE              

           

 

                      G. VAGINALIS (test code = 44692) POSITIVE                 

        

 

                      T. VAGINALIS (test code = 38333) NEGATIVE                 

        





Melvin WOODWARD AustinVAGINAL PATHOGENS DNA [id number] 00:00:00* 



                      Test Item  Value      Reference Range Interpretation Comme

nts

 

                      TETO SPECIES (test code = 66064) NEGATIVE              

           

 

                      G. VAGINALIS (test code = 54378) POSITIVE                 

        

 

                      T. VAGINALIS (test code = 90083) NEGATIVE                 

        





Melvin F AustinVAGINAL PATHOGENS DNA [id number] 00:00:00* 



                      Test Item  Value      Reference Range Interpretation Comme

nts

 

                      TETO SPECIES (test code = 62105) NEGATIVE              

           

 

                      G. VAGINALIS (test code = 71726) POSITIVE                 

        

 

                      T. VAGINALIS (test code = 10867) NEGATIVE                 

        





Melvin F AustinVAGINAL PATHOGENS DNA [id number] 00:00:00* 



                      Test Item  Value      Reference Range Interpretation Comme

nts

 

                      TETO SPECIES (test code = ) NEGATIVE              

           

 

                      G. VAGINALIS (test code = 01032) POSITIVE                 

        

 

                      T. VAGINALIS (test code = 28574) NEGATIVE                 

        





Melvin WOODWARD AustinVAGINAL PATHOGENS DNA [id number] 00:00:00* 



                      Test Item  Value      Reference Range Interpretation Comme

nts

 

                      TETO SPECIES (test code = ) NEGATIVE              

           

 

                      G. VAGINALIS (test code = 77109) POSITIVE                 

        

 

                      T. VAGINALIS (test code = 66132) NEGATIVE                 

        





VAGINAL PATHOGENS DNA [id number] 00:00:00* 



                      Test Item  Value      Reference Range Interpretation Comme

nts

 

                      TETO SPECIES (test code = ) NEGATIVE              

           

 

                      G. VAGINALIS (test code = 88871) POSITIVE                 

        

 

                      T. VAGINALIS (test code = 58628) NEGATIVE                 

        





GC AND CHLAMYDIA, AMPLIFIED, URINE2020-07-15 00:00:00* 



                      Test Item  Value      Reference Range Interpretation Comme

nts

 

                      GONORRHEA, TMA (test code = 80481) NEGATIVE               

          

 

                      CHLAMYDIA, TMA (test code = 74003) NEGATIVE               

          





Melvin BenitezHPV HIGH RISK WITH GENOTYPE, [id number] 00:00:00* 



                      Test Item  Value      Reference Range Interpretation Comme

william

 

                                                    HPV HIGH RISK INTERP (test c

ode = 

80650)          NEGATIVE                                        

 

                      HPV 16 (test code = 62726) NEGATIVE                       

  

 

                      HPV 18 (test code = 83090) NEGATIVE                       

  

 

                                                    HPV, HR, OTHER GENOTYPES (te

st code 

= 70194)        NEGATIVE                                        





Melvin BenitezHIV AB/AG COMBO RFLX CONF2020-07-15 00:00:00* 



                      Test Item  Value      Reference Range Interpretation Comme

nts

 

                                                    HIV 1/2 4TH GEN, RFLX CONF (

test 

code = 3514)    NON-REACTIVE                                    





Melvin BenitezACUTE HEPATITIS PROFILE2020-07-15 00:00:00* 



                      Test Item  Value      Reference Range Interpretation Comme

nts

 

                                                    HEPATITIS A IgM (test code =

 

68803)          NON-REACTIVE                                    

 

                                                    HEPATITIS B CORE IgM (test c

ode 

= 4644)         NON-REACTIVE                                    

 

                                                    HEPATITIS B SURF AG (test co

de = 

9243)           NON-REACTIVE                                    

 

                                                    HEPATITIS C ANTIBODY (test c

ode 

= 4649)         NON-REACTIVE                                    

 

                                                    INTERPRETATION HEPATITIS A: 

(test code = 2552) (NOTE)                                          

 

                                                    INTERPRETATION HEPATITIS B: 

(test code = 70856) (NOTE)                                          

 

                                                    INTERPRETATION HEPATITIS C: 

(test code = 04600) (NOTE)                                          





Melvin F [id number] 00:00:00* 



                      Test Item  Value      Reference Range Interpretation Comme

nts

 

                                                    RPR RESULT (test code = 

3501)           NON-REACTIVE                                    

 

                      RPR TITER (test code = 3500) NOT INDIC. TITER             

          





Melvin BenitezPAP TEST, THINPREP, IMAGED2020-07-15 00:00:00* 



                      Test Item  Value      Reference Range Interpretation Comme

nts

 

                                                    SOURCE: (test code = 

8001)           Cervical/Endocervical                                 

 

                                                    SLIDES: (test code = 

8011)           1                                               

 

                      LMP: (test code = 8021) 2020                          

 

                                                    SPECIMEN ADEQUACY: (test 

code = 88856)   (NOTE)                                          

 

                                                    INTERPRETATION: (test 

code = 47814)                           NILM/NO EPITH. 

ABNORMALITY;SEE BELOW                                         

 

                                                    CYTOTECHNOLOGIST: (test 

code = 8101)                            LYNN Horton 

(ASCP)                                                      

 

                                                    LOCATION: (test code = 

87155)          (NOTE)                                          

 

                      CPT: (test code = 8140) (NOTE)                           





Melvin BenitezGC AND CHLAMYDIA, AMPLIFIED, URINE2020-07-15 00:00:00* 



                      Test Item  Value      Reference Range Interpretation Comme

nts

 

                      GONORRHEA, TMA (test code = 29982) NEGATIVE               

          

 

                      CHLAMYDIA, TMA (test code = 47035) NEGATIVE               

          





Melvin BenitezHPV HIGH RISK WITH GENOTYPE, TP2020-07-15 00:00:00* 



                      Test Item  Value      Reference Range Interpretation Comme

[facility]

 

                                                    HPV HIGH RISK INTERP (test c

ode = 

19726)          NEGATIVE                                        

 

                      HPV 16 (test code = 72262) NEGATIVE                       

  

 

                      HPV 18 (test code = 29684) NEGATIVE                       

  

 

                                                    HPV, HR, OTHER GENOTYPES (te

st code 

= 44169)        NEGATIVE                                        





Melvin BenitezHIV AB/AG COMBO RFLX CONF2020-07-15 00:00:00* 



                      Test Item  Value      Reference Range Interpretation Comme

nts

 

                                                    HIV 1/2 4TH GEN, RFLX CONF (

test 

code = 3514)    NON-REACTIVE                                    





Melvin BenitezACUTE HEPATITIS PROFILE2020-07-15 00:00:00* 



                      Test Item  Value      Reference Range Interpretation Comme

nts

 

                                                    HEPATITIS A IgM (test code =

 

72040)          NON-REACTIVE                                    

 

                                                    HEPATITIS B CORE IgM (test c

ode 

= 4644)         NON-REACTIVE                                    

 

                                                    HEPATITIS B SURF AG (test co

de = 

8039)           NON-REACTIVE                                    

 

                                                    HEPATITIS C ANTIBODY (test c

ode 

= 4685)         NON-REACTIVE                                    

 

                                                    INTERPRETATION HEPATITIS A: 

(test code = 2552) (NOTE)                                          

 

                                                    INTERPRETATION HEPATITIS B: 

(test code = 17436) (NOTE)                                          

 

                                                    INTERPRETATION HEPATITIS C: 

(test code = 24136) (NOTE)                                          





Melvin [id number] 00:00:00* 



                      Test Item  Value      Reference Range Interpretation Comme

nts

 

                                                    RPR RESULT (test code = 

3501)           NON-REACTIVE                                    

 

                      RPR TITER (test code = 3500) NOT INDIC. TITER             

          





Melvin BenitezPAP TEST, THINPREP, IMAGED2020-07-15 00:00:00* 



                      Test Item  Value      Reference Range Interpretation Comme

nts

 

                                                    SOURCE: (test code = 

8001)           Cervical/Endocervical                                 

 

                                                    SLIDES: (test code = 

8011)           1                                               

 

                      LMP: (test code = 8021) 2020                          

 

                                                    SPECIMEN ADEQUACY: (test 

code = 55248)   (NOTE)                                          

 

                                                    INTERPRETATION: (test 

code = 97364)                           NILM/NO EPITH. 

ABNORMALITY;SEE BELOW                                         

 

                                                    CYTOTECHNOLOGIST: (test 

code = 8101)                            LYNN Horton 

(ASCP)                                                      

 

                                                    LOCATION: (test code = 

26448)          (NOTE)                                          

 

                      CPT: (test code = 8140) (NOTE)                           





Melvin BenitezGC AND CHLAMYDIA, AMPLIFIED, URINE2020-07-15 00:00:00* 



                      Test Item  Value      Reference Range Interpretation Comme

nts

 

                      GONORRHEA, TMA (test code = 82637) NEGATIVE               

          

 

                      CHLAMYDIA, TMA (test code = 35894) NEGATIVE               

          





Melvin BenitezHPV HIGH RISK WITH GENOTYPE, TP2020-07-15 00:00:00* 



                      Test Item  Value      Reference Range Interpretation Comme

nts

 

                                                    HPV HIGH RISK INTERP (test c

ode = 

41576)          NEGATIVE                                        

 

                      HPV 16 (test code = 67883) NEGATIVE                       

  

 

                      HPV 18 (test code = 62691) NEGATIVE                       

  

 

                                                    HPV, HR, OTHER GENOTYPES (te

st code 

= 99762)        NEGATIVE                                        





Melvin BenitezHIV AB/AG COMBO RFLX CONF2020-07-15 00:00:00* 



                      Test Item  Value      Reference Range Interpretation Comme

nts

 

                                                    HIV 1/2 4TH GEN, RFLX CONF (

test 

code = 3514)    NON-REACTIVE                                    





Melvin BenitezACUTE HEPATITIS PROFILE2020-07-15 00:00:00* 



                      Test Item  Value      Reference Range Interpretation Comme

nts

 

                                                    HEPATITIS A IgM (test code =

 

79001)          NON-REACTIVE                                    

 

                                                    HEPATITIS B CORE IgM (test c

ode 

= 2253)         NON-REACTIVE                                    

 

                                                    HEPATITIS B SURF AG (test co

de = 

8432)           NON-REACTIVE                                    

 

                                                    HEPATITIS C ANTIBODY (test c

ode 

= 4675)         NON-REACTIVE                                    

 

                                                    INTERPRETATION HEPATITIS A: 

(test code = 2552) (NOTE)                                          

 

                                                    INTERPRETATION HEPATITIS B: 

(test code = 67681) (NOTE)                                          

 

                                                    INTERPRETATION HEPATITIS C: 

(test code = 21650) (NOTE)                                          





Melvin [id number] 00:00:00* 



                      Test Item  Value      Reference Range Interpretation Comme

nts

 

                                                    RPR RESULT (test code = 

3501)           NON-REACTIVE                                    

 

                      RPR TITER (test code = 3500) NOT INDIC. TITER             

          





Melvin BenitezPAP TEST, THINPREP, IMAGED2020-07-15 00:00:00* 



                      Test Item  Value      Reference Range Interpretation Comme

nts

 

                                                    SOURCE: (test code = 

8001)           Cervical/Endocervical                                 

 

                                                    SLIDES: (test code = 

8011)           1                                               

 

                      LMP: (test code = 8021) 2020                          

 

                                                    SPECIMEN ADEQUACY: (test 

code = 19858)   (NOTE)                                          

 

                                                    INTERPRETATION: (test 

code = 24687)                           NILM/NO EPITH. 

ABNORMALITY;SEE BELOW                                         

 

                                                    CYTOTECHNOLOGIST: (test 

code = 8101)                            LYNN Horton 

(ASCP)                                                      

 

                                                    LOCATION: (test code = 

86425)          (NOTE)                                          

 

                      CPT: (test code = 8140) (NOTE)                           





Melvin BenitezGC AND CHLAMYDIA, AMPLIFIED, URINE2020-07-15 00:00:00* 



                      Test Item  Value      Reference Range Interpretation Comme

[facility]

 

                      GONORRHEA, TMA (test code = 14917) NEGATIVE               

          

 

                      CHLAMYDIA, TMA (test code = 90570) NEGATIVE               

          





Melvin BenitezHPV HIGH RISK WITH GENOTYPE, TP2020-07-15 00:00:00* 



                      Test Item  Value      Reference Range Interpretation Comme

[facility]

 

                                                    HPV HIGH RISK INTERP (test c

ode = 

69680)          NEGATIVE                                        

 

                      HPV 16 (test code = 26751) NEGATIVE                       

  

 

                      HPV 18 (test code = 56745) NEGATIVE                       

  

 

                                                    HPV, HR, OTHER GENOTYPES (te

st code 

= 08515)        NEGATIVE                                        





Melvin BenitezHIV AB/AG COMBO RFLX CONF2020-07-15 00:00:00* 



                      Test Item  Value      Reference Range Interpretation Comme

[facility]

 

                                                    HIV 1/2 4TH GEN, RFLX CONF (

test 

code = 3514)    NON-REACTIVE                                    





Melvin BenitezACUTE HEPATITIS PROFILE2020-07-15 00:00:00* 



                      Test Item  Value      Reference Range Interpretation Comme

nts

 

                                                    HEPATITIS A IgM (test code =

 

67502)          NON-REACTIVE                                    

 

                                                    HEPATITIS B CORE IgM (test c

ode 

= 4644)         NON-REACTIVE                                    

 

                                                    HEPATITIS B SURF AG (test co

de = 

2739)           NON-REACTIVE                                    

 

                                                    HEPATITIS C ANTIBODY (test c

ode 

= 4677)         NON-REACTIVE                                    

 

                                                    INTERPRETATION HEPATITIS A: 

(test code = 2552) (NOTE)                                          

 

                                                    INTERPRETATION HEPATITIS B: 

(test code = 54039) (NOTE)                                          

 

                                                    INTERPRETATION HEPATITIS C: 

(test code = 50291) (NOTE)                                          





Melvin [id number] 00:00:00* 



                      Test Item  Value      Reference Range Interpretation Comme

nts

 

                                                    RPR RESULT (test code = 

3501)           NON-REACTIVE                                    

 

                      RPR TITER (test code = 3500) NOT INDIC. TITER             

          





Melvin BenitezPAP TEST, THINPREP, IMAGED2020-07-15 00:00:00* 



                      Test Item  Value      Reference Range Interpretation Comme

nts

 

                                                    SOURCE: (test code = 

8001)           Cervical/Endocervical                                 

 

                                                    SLIDES: (test code = 

8011)           1                                               

 

                      LMP: (test code = 8021) 2020                          

 

                                                    SPECIMEN ADEQUACY: (test 

code = 51423)   (NOTE)                                          

 

                                                    INTERPRETATION: (test 

code = 28941)                           NILM/NO EPITH. 

ABNORMALITY;SEE BELOW                                         

 

                                                    CYTOTECHNOLOGIST: (test 

code = 8101)                            LYNN Horton 

(ASCP)                                                      

 

                                                    LOCATION: (test code = 

27670)          (NOTE)                                          

 

                      CPT: (test code = 8140) (NOTE)                           





Melvin BenitezPAP TEST, THINPREP, IMAGED2020-07-15 00:00:00* 



                      Test Item  Value      Reference Range Interpretation Comme

nts

 

                                                    SOURCE: (test code = 

8001)           Cervical/Endocervical                                 

 

                                                    SLIDES: (test code = 

8011)           1                                               

 

                      LMP: (test code = 8021) 2020                          

 

                                                    SPECIMEN ADEQUACY: (test 

code = 73775)   (NOTE)                                          

 

                                                    INTERPRETATION: (test 

code = 26397)                           NILM/NO EPITH. 

ABNORMALITY;SEE BELOW                                         

 

                                                    CYTOTECHNOLOGIST: (test 

code = 8101)                            LYNN Horton 

(ASCP)                                                      

 

                                                    LOCATION: (test code = 

79174)          (NOTE)                                          

 

                      CPT: (test code = 8140) (NOTE)                           





HPV HIGH RISK WITH GENOTYPE, TP2020-07-15 00:00:00* 



                      Test Item  Value      Reference Range Interpretation Comme

nts

 

                                                    HPV HIGH RISK INTERP (test c

ode = 

79021)          NEGATIVE                                        

 

                      HPV 16 (test code = 99838) NEGATIVE                       

  

 

                      HPV 18 (test code = 98045) NEGATIVE                       

  

 

                                                    HPV, HR, OTHER GENOTYPES (te

st code 

= 23395)        NEGATIVE                                        





GC AND CHLAMYDIA, AMPLIFIED, URINE2020-07-15 00:00:00* 



                      Test Item  Value      Reference Range Interpretation Comme

nts

 

                      GONORRHEA, TMA (test code = 51610) NEGATIVE               

          

 

                      CHLAMYDIA, TMA (test code = 94409) NEGATIVE               

          





HIV AB/AG COMBO RFLX CONF2020-07-15 00:00:00* 



                      Test Item  Value      Reference Range Interpretation Comme

nts

 

                                                    HIV 1/2 4TH GEN, RFLX CONF (

test 

code = 3514)    NON-REACTIVE                                    





ACUTE HEPATITIS PROFILE2020-07-15 00:00:00* 



                      Test Item  Value      Reference Range Interpretation Comme

nts

 

                                                    HEPATITIS A IgM (test code =

 

62919)          NON-REACTIVE                                    

 

                                                    HEPATITIS B CORE IgM (test c

ode 

= 4644)         NON-REACTIVE                                    

 

                                                    HEPATITIS B SURF AG (test co

de = 

2739)           NON-REACTIVE                                    

 

                                                    HEPATITIS C ANTIBODY (test c

ode 

= 4675)         NON-REACTIVE                                    

 

                                                    INTERPRETATION HEPATITIS A: 

(test code = 2552) (NOTE)                                          

 

                                                    INTERPRETATION HEPATITIS B: 

(test code = 63144) (NOTE)                                          

 

                                                    INTERPRETATION HEPATITIS C: 

(test code = 35503) (NOTE)                                          





RPR2020-07-15 00:00:00* 



                      Test Item  Value      Reference Range Interpretation Comme

nts

 

                                                    RPR RESULT (test code = 

3501)           NON-REACTIVE                                    

 

                      RPR TITER (test code = 3500) NOT INDIC. TITER             

          





PAP TEST, THINPREP, IMAGED2020-07-15 00:00:00* 



                      Test Item  Value      Reference Range Interpretation Comme

nts

 

                                                    SOURCE: (test code = 

8001)           Cervical/Endocervical                                 

 

                                                    SLIDES: (test code = 

8011)           1                                               

 

                      LMP: (test code = 8021) 2020                          

 

                                                    SPECIMEN ADEQUACY: (test 

code = 38882)   (NOTE)                                          

 

                                                    INTERPRETATION: (test 

code = 93840)                           NILM/NO EPITH. 

ABNORMALITY;SEE BELOW                                         

 

                                                    CYTOTECHNOLOGIST: (test 

code = 8101)                            LYNN Horton 

(ASCP)                                                      

 

                                                    LOCATION: (test code = 

67642)          (NOTE)                                          

 

                      CPT: (test code = 8140) (NOTE)                           





HPV HIGH RISK WITH GENOTYPE, TP2020-07-15 00:00:00* 



                      Test Item  Value      Reference Range Interpretation Comme

nts

 

                                                    HPV HIGH RISK INTERP (test c

ode = 

33387)          NEGATIVE                                        

 

                      HPV 16 (test code = 91951) NEGATIVE                       

  

 

                      HPV 18 (test code = 05678) NEGATIVE                       

  

 

                                                    HPV, HR, OTHER GENOTYPES (te

st code 

= 41504)        NEGATIVE                                        





GC AND CHLAMYDIA, AMPLIFIED, URINE2020-07-15 00:00:00* 



                      Test Item  Value      Reference Range Interpretation Comme

nts

 

                      GONORRHEA, TMA (test code = 12318) NEGATIVE               

          

 

                      CHLAMYDIA, TMA (test code = 52367) NEGATIVE               

          





HIV AB/AG COMBO RFLX CONF2020-07-15 00:00:00* 



                      Test Item  Value      Reference Range Interpretation Comme

nts

 

                                                    HIV 1/2 4TH GEN, RFLX CONF (

test 

code = 3514)    NON-REACTIVE                                    





ACUTE HEPATITIS PROFILE2020-07-15 00:00:00* 



                      Test Item  Value      Reference Range Interpretation Comme

nts

 

                                                    HEPATITIS A IgM (test code =

 

84634)          NON-REACTIVE                                    

 

                                                    HEPATITIS B CORE IgM (test c

ode 

= 4644)         NON-REACTIVE                                    

 

                                                    HEPATITIS B SURF AG (test co

de = 

2739)           NON-REACTIVE                                    

 

                                                    HEPATITIS C ANTIBODY (test c

ode 

= 4675)         NON-REACTIVE                                    

 

                                                    INTERPRETATION HEPATITIS A: 

(test code = 2552) (NOTE)                                          

 

                                                    INTERPRETATION HEPATITIS B: 

(test code = 94734) (NOTE)                                          

 

                                                    INTERPRETATION HEPATITIS C: 

(test code = 47129) (NOTE)                                          





RPR2020-07-15 00:00:00* 



                      Test Item  Value      Reference Range Interpretation Comme

nts

 

                                                    RPR RESULT (test code = 

3501)           NON-REACTIVE                                    

 

                      RPR TITER (test code = 3500) NOT INDIC. TITER             

          









Notes





                          Date/Time    Note         Provider     Source







                                        

 

                                        

 

                                        

 

                                        

 

                                        

 

                                        

 

                                        

 

                                        

 

                                        

 

                                        

 

                                        

 

                                        

 

                                        

 

                                        

 

                                        

 

                                        

 

                                        

 

                                        

 

                                        

 

                                        

 

                                        

 

                                        

 

                                        

 

                                        

 

                                        

 

                                        

 

                                        

 

                                        

 

                                        

 

                                        

 

                                        

 

                                        

 

                                        

 

                                        

 

                                        

 

                                        

 

                                        

 

                                        

 

                                        

 

                                        

 

                                        

 

                                        

 

                                        

 

                                        

 

                                        

 

                                        

 

                                        

 

                                        

 

                                        

 

                                        

 

                                        



Melvin ANDREA [facility]2024-11-08 08:41:22



Formatting of this note might be different from the original.

Called from [facility], no answer. Patient visualized walking out of ER lobby doors.

Electronically signed by Mayda Tineo RN at 2024 8:41 AM MATTHEW Tineo RNUTMB - [id number] 08:21:03



Formatting of this note might be different from the original.

Patient states: "Since the  I've had this rash all over my body and burning

everywhere. I went to [facility] on the  and they gave me a steroid shot and

it worked until yesterday evening"







Pmhx: none

Electronically signed by Jodi Carbajal RN at 2024 8:21 AM MATTHEW Carbajal RNUTMB - [id number] 11:59:49



Formatting of this note might be different from the original.

PT D/C home. GCS15, VS stable, no ataxia noted. Given one prescription and D/C

paperwork. Pt ambulatory at time of discharge. Pt educated on possible

infection, med usage, follow up care, s/s worsening condition. Pt verbalized

understanding.







Electronically signed by Gail Hansen RN at 2024 12:00 PM CDT





Gail Hansen RNUTMB - [id number] 08:54:09



Formatting of this note might be different from the original.

Edvin House is a 34 year old female c/o left hand pain s/p " I think a

bug bit my hand and it hurts" did not take anything for pain

Electronically signed by Renée Garza RN at 2024 8:55 AM CDT





Renée Garza RNUTMB - [id number] 00:00:00



                                        







                                        

 

                                        

 

                                        

 

                                        

 

                                        

 

                                        

 

                                        

 

                                        

 

                                        

 

                                        

 

                                        

 

                                        

 

                                        

 

                                        

 

                                        

 

                                        

 

                                        

 

                                        

 

                                        

 

                                        

 

                                        

 

                                        

 

                                        

 

                                        

 

                                        

 

                                        

 

                                        

 

                                        

 

                                        

 

                                        

 

                                        

 

                                        

 

                                        

 

                                        

 

                                        

 

                                        

 

                                        

 

                                        

 

                                        

 

                                        

 

                                        

 

                                        

 

                                        

 

                                        

 

                                        

 

                                        

 

                                        

 

                                        

 

                                        



[facility]2024-08-29 00:00:00



                                        







                                        

 

                                        

 

                                        

 

                                        

 

                                        

 

                                        

 

                                        

 

                                        

 

                                        

 

                                        

 

                                        

 

                                        

 

                                        

 

                                        

 

                                        

 

                                        

 

                                        

 

                                        

 

                                        

 

                                        

 

                                        

 

                                        

 

                                        

 

                                        

 

                                        

 

                                        

 

                                        

 

                                        

 

                                        

 

                                        

 

                                        

 

                                        

 

                                        

 

                                        

 

                                        

 

                                        

 

                                        

 

                                        

 

                                        

 

                                        

 

                                        

 

                                        

 

                                        

 

                                        

 

                                        

 

                                        

 

                                        

 

                                        



[facility]2024-05-01 00:00:00



                                        







                                        

 

                                        

 

                                        

 

                                        

 

                                        

 

                                        

 

                                        

 

                                        

 

                                        

 

                                        

 

                                        

 

                                        

 

                                        

 

                                        

 

                                        

 

                                        

 

                                        

 

                                        

 

                                        

 

                                        

 

                                        

 

                                        

 

                                        

 

                                        

 

                                        

 

                                        

 

                                        

 

                                        

 

                                        

 

                                        

 

                                        

 

                                        

 

                                        

 

                                        

 

                                        

 

                                        

 

                                        

 

                                        

 

                                        

 

                                        

 

                                        

 

                                        

 

                                        

 

                                        

 

                                        

 

                                        

 

                                        

 

                                        



[facility]2021-03-04 [id number] [place] [address]





PATIENT NAME: EDVIN HOUSE ADMIT DATE: 21

ACCOUNT NO: [id number] ROOM NO: [id number]

MEDICAL RECORD NO: [id number] AGE: 30

REPORT TYPE: OPERATIVE REPORT SEX: F



ADMITTING PHYSICIAN:Josué Flores MD

ATTENDING PHYSICIAN:Josué Flores MD





OPERATION DATE:2021



PREOPERATIVE DIAGNOSIS: Undesired fertility, status post vaginal delivery

yesterday.



POSTOPERATIVE DIAGNOSIS:



PROCEDURE PERFORMED: Postpartum minilaparotomy, bilateral tubal ligation using

the [place] technique.



SURGEON: Josué Flores MD



ASSISTANT:



ANESTHESIA: General.



PROCEDURE IN DETAIL: After appropriate consents were signed, the patient was

taken to the OR. She was given general anesthesia, prepped and draped in the

usual sterile manner. A small transverse incision was made just below the

umbilicus and taken down to the fascia. Fascia was cut, peritoneum was elevated

and cut. Peritoneal cavity was entered carefully. The left fallopian tube was

directly visualized, elevated with Salisbury clamp, suture ligated using the

[place] technique and using 2-0 PDS suture. About 1 cm fallopian tube was

removed from the ampullary region and sent to pathology. The ends were

electrocoagulated. The same was repeated on the opposite side. About 1 cm

fallopian tube was removed from each side. Fascia was grasped with Allis

clamps, suture approximated with 0 Vicryl suture. Subcutaneous tissue was

approximated with 2-0 Vicryl suture in a running manner. Skin was approximated

with 3-0 Monocryl suture in a running manner. Dermabond was placed over the

incision.



ESTIMATED BLOOD LOSS: Minimal.



The patient was taken to recovery room in good condition.



Dictated By: Josué Flores MD



WT: [id number].[id number]/NTS

DD: 2021 11:46:47

DT: 2021 14:07:46

Conf#: [id number]/DID#: [id number]





PATIENT NAME EDVIN HOUSE ACCOUNT #: [id number]









Authenticated and Edited by Josué Flores MD On 3/09/21 2:05:09 PM









Electronically Signed by Josué Flores MD on 21 at 1407

































































































PATIENT NAME EDVIN HOUSE ACCOUNT #: [id number]

## 2025-03-11 NOTE — EKG
Test Date:    2025-03-09               Test Time:    16:34:30

Technician:   OLIVE                                    

                                                     

MEASUREMENT RESULTS:                                       

Intervals:                                           

Rate:         70                                     

MN:           146                                    

QRSD:         80                                     

QT:           398                                    

QTc:          429                                    

Axis:                                                

P:            56                                     

MN:           146                                    

QRS:          97                                     

T:            55                                     

                                                     

INTERPRETIVE STATEMENTS:                                       

                                                     

Normal sinus rhythm

Rightward axis

Borderline ECG

No previous ECG available for comparison



Electronically Signed On 03-11-25 10:59:19 CDT by Miguel Angel Ji

## 2025-04-26 ENCOUNTER — HOSPITAL ENCOUNTER (EMERGENCY)
Dept: HOSPITAL 97 - ER | Age: 35
Discharge: HOME | End: 2025-04-26
Payer: SELF-PAY

## 2025-04-26 DIAGNOSIS — J06.9: Primary | ICD-10-CM

## 2025-04-26 PROCEDURE — 99284 EMERGENCY DEPT VISIT MOD MDM: CPT

## 2025-04-26 PROCEDURE — 96372 THER/PROPH/DIAG INJ SC/IM: CPT

## 2025-04-26 PROCEDURE — 87070 CULTURE OTHR SPECIMN AEROBIC: CPT

## 2025-04-26 PROCEDURE — 36415 COLL VENOUS BLD VENIPUNCTURE: CPT

## 2025-04-28 VITALS — TEMPERATURE: 98.5 F

## 2025-04-28 VITALS — SYSTOLIC BLOOD PRESSURE: 128 MMHG | OXYGEN SATURATION: 98 % | DIASTOLIC BLOOD PRESSURE: 74 MMHG
